# Patient Record
Sex: FEMALE | Race: BLACK OR AFRICAN AMERICAN | Employment: OTHER | URBAN - NONMETROPOLITAN AREA
[De-identification: names, ages, dates, MRNs, and addresses within clinical notes are randomized per-mention and may not be internally consistent; named-entity substitution may affect disease eponyms.]

---

## 2017-01-12 RX ORDER — HYDROCODONE BITARTRATE AND ACETAMINOPHEN 10; 325 MG/1; MG/1
1 TABLET ORAL 4 TIMES DAILY
Qty: 120 TABLET | Refills: 0 | Status: SHIPPED | OUTPATIENT
Start: 2017-01-12 | End: 2017-02-13 | Stop reason: SDUPTHER

## 2017-01-23 ENCOUNTER — OFFICE VISIT (OUTPATIENT)
Dept: PAIN MANAGEMENT | Age: 57
End: 2017-01-23

## 2017-01-23 VITALS
HEART RATE: 78 BPM | SYSTOLIC BLOOD PRESSURE: 132 MMHG | DIASTOLIC BLOOD PRESSURE: 84 MMHG | WEIGHT: 166.8 LBS | RESPIRATION RATE: 16 BRPM | HEIGHT: 60 IN | BODY MASS INDEX: 32.75 KG/M2

## 2017-01-23 DIAGNOSIS — M54.12 CERVICAL RADICULAR PAIN: ICD-10-CM

## 2017-01-23 DIAGNOSIS — G89.29 CHRONIC LEFT SHOULDER PAIN: ICD-10-CM

## 2017-01-23 DIAGNOSIS — M25.512 CHRONIC LEFT SHOULDER PAIN: ICD-10-CM

## 2017-01-23 DIAGNOSIS — M79.7 FIBROMYALGIA: ICD-10-CM

## 2017-01-23 DIAGNOSIS — Z79.891 ENCOUNTER FOR LONG-TERM OPIATE ANALGESIC USE: Primary | ICD-10-CM

## 2017-01-23 DIAGNOSIS — Z02.83 ENCOUNTER FOR DRUG SCREENING: ICD-10-CM

## 2017-01-23 DIAGNOSIS — M47.812 FACET ARTHRITIS OF CERVICAL REGION: ICD-10-CM

## 2017-01-23 DIAGNOSIS — M54.2 CERVICALGIA: ICD-10-CM

## 2017-01-23 PROCEDURE — 3014F SCREEN MAMMO DOC REV: CPT | Performed by: NURSE PRACTITIONER

## 2017-01-23 PROCEDURE — 3017F COLORECTAL CA SCREEN DOC REV: CPT | Performed by: NURSE PRACTITIONER

## 2017-01-23 PROCEDURE — 1036F TOBACCO NON-USER: CPT | Performed by: NURSE PRACTITIONER

## 2017-01-23 PROCEDURE — G8419 CALC BMI OUT NRM PARAM NOF/U: HCPCS | Performed by: NURSE PRACTITIONER

## 2017-01-23 PROCEDURE — G8427 DOCREV CUR MEDS BY ELIG CLIN: HCPCS | Performed by: NURSE PRACTITIONER

## 2017-01-23 PROCEDURE — 99214 OFFICE O/P EST MOD 30 MIN: CPT | Performed by: NURSE PRACTITIONER

## 2017-01-23 PROCEDURE — G8484 FLU IMMUNIZE NO ADMIN: HCPCS | Performed by: NURSE PRACTITIONER

## 2017-01-24 DIAGNOSIS — M25.50 ARTHRALGIA, UNSPECIFIED JOINT: Primary | ICD-10-CM

## 2017-02-07 ENCOUNTER — HOSPITAL ENCOUNTER (EMERGENCY)
Age: 57
Discharge: HOME | End: 2017-02-07
Attending: EMERGENCY MEDICINE
Payer: MEDICARE

## 2017-02-07 VITALS
DIASTOLIC BLOOD PRESSURE: 86 MMHG | SYSTOLIC BLOOD PRESSURE: 139 MMHG | TEMPERATURE: 97.9 F | WEIGHT: 167 LBS | HEART RATE: 100 BPM | BODY MASS INDEX: 32.79 KG/M2 | RESPIRATION RATE: 16 BRPM | OXYGEN SATURATION: 99 % | HEIGHT: 60 IN

## 2017-02-07 DIAGNOSIS — G89.29 CHRONIC NECK PAIN: Primary | ICD-10-CM

## 2017-02-07 DIAGNOSIS — M54.2 CHRONIC NECK PAIN: Primary | ICD-10-CM

## 2017-02-07 PROCEDURE — 6360000002 HC RX W HCPCS: Performed by: EMERGENCY MEDICINE

## 2017-02-07 PROCEDURE — 96372 THER/PROPH/DIAG INJ SC/IM: CPT

## 2017-02-07 PROCEDURE — 99283 EMERGENCY DEPT VISIT LOW MDM: CPT

## 2017-02-07 RX ORDER — KETOROLAC TROMETHAMINE 30 MG/ML
30 INJECTION, SOLUTION INTRAMUSCULAR; INTRAVENOUS ONCE
Status: COMPLETED | OUTPATIENT
Start: 2017-02-07 | End: 2017-02-07

## 2017-02-07 RX ADMIN — KETOROLAC TROMETHAMINE 30 MG: 30 INJECTION, SOLUTION INTRAMUSCULAR at 22:27

## 2017-02-07 ASSESSMENT — PAIN DESCRIPTION - ONSET: ONSET: ON-GOING

## 2017-02-07 ASSESSMENT — PAIN DESCRIPTION - PAIN TYPE: TYPE: CHRONIC PAIN

## 2017-02-07 ASSESSMENT — PAIN DESCRIPTION - FREQUENCY: FREQUENCY: INTERMITTENT

## 2017-02-07 ASSESSMENT — PAIN - FUNCTIONAL ASSESSMENT: PAIN_FUNCTIONAL_ASSESSMENT: 0-10

## 2017-02-07 ASSESSMENT — PAIN DESCRIPTION - PROGRESSION: CLINICAL_PROGRESSION: GRADUALLY WORSENING

## 2017-02-07 ASSESSMENT — PAIN SCALES - GENERAL
PAINLEVEL_OUTOF10: 10
PAINLEVEL_OUTOF10: 10
PAINLEVEL_OUTOF10: 7

## 2017-02-07 ASSESSMENT — PAIN DESCRIPTION - LOCATION: LOCATION: NECK;SHOULDER

## 2017-02-07 ASSESSMENT — PAIN DESCRIPTION - ORIENTATION: ORIENTATION: LEFT

## 2017-02-07 ASSESSMENT — PAIN DESCRIPTION - DESCRIPTORS: DESCRIPTORS: SPASM

## 2017-02-08 ENCOUNTER — TELEPHONE (OUTPATIENT)
Dept: FAMILY MEDICINE CLINIC | Age: 57
End: 2017-02-08

## 2017-02-14 RX ORDER — CYCLOBENZAPRINE HCL 10 MG
10 TABLET ORAL 3 TIMES DAILY PRN
Qty: 90 TABLET | Refills: 11 | Status: SHIPPED | OUTPATIENT
Start: 2017-02-14 | End: 2017-03-13 | Stop reason: SDUPTHER

## 2017-02-14 RX ORDER — HYDROCODONE BITARTRATE AND ACETAMINOPHEN 10; 325 MG/1; MG/1
1 TABLET ORAL 4 TIMES DAILY
Qty: 120 TABLET | Refills: 0 | Status: SHIPPED | OUTPATIENT
Start: 2017-02-14 | End: 2017-03-13 | Stop reason: SDUPTHER

## 2017-03-11 ENCOUNTER — HOSPITAL ENCOUNTER (INPATIENT)
Age: 57
LOS: 1 days | Discharge: HOME | DRG: 392 | End: 2017-03-12
Attending: EMERGENCY MEDICINE | Admitting: FAMILY MEDICINE
Payer: MEDICARE

## 2017-03-11 ENCOUNTER — APPOINTMENT (OUTPATIENT)
Dept: CT IMAGING | Age: 57
DRG: 392 | End: 2017-03-11
Payer: MEDICARE

## 2017-03-11 DIAGNOSIS — R10.12 LEFT UPPER QUADRANT PAIN: ICD-10-CM

## 2017-03-11 DIAGNOSIS — R10.13 EPIGASTRIC PAIN: Primary | ICD-10-CM

## 2017-03-11 LAB
-: NORMAL
ABSOLUTE EOS #: 0.2 K/UL (ref 0–0.4)
ABSOLUTE LYMPH #: 1.9 K/UL (ref 1–4.8)
ABSOLUTE MONO #: 0.5 K/UL (ref 0.1–1.2)
ALBUMIN SERPL-MCNC: 4.4 G/DL (ref 3.5–5.2)
ALBUMIN/GLOBULIN RATIO: 1.2 (ref 1–2.5)
ALP BLD-CCNC: 69 U/L (ref 35–104)
ALT SERPL-CCNC: 16 U/L (ref 5–33)
AMORPHOUS: NORMAL
AMYLASE: 170 U/L (ref 28–100)
ANION GAP SERPL CALCULATED.3IONS-SCNC: 17 MMOL/L (ref 9–17)
AST SERPL-CCNC: 23 U/L
BACTERIA: NORMAL
BASOPHILS # BLD: 0 % (ref 0–2)
BASOPHILS ABSOLUTE: 0 K/UL (ref 0–0.2)
BILIRUB SERPL-MCNC: 0.15 MG/DL (ref 0.3–1.2)
BILIRUBIN DIRECT: <0.08 MG/DL
BILIRUBIN URINE: NEGATIVE
BILIRUBIN, INDIRECT: ABNORMAL MG/DL (ref 0–1)
BUN BLDV-MCNC: 13 MG/DL (ref 6–20)
BUN/CREAT BLD: 17 (ref 9–20)
CALCIUM SERPL-MCNC: 9.6 MG/DL (ref 8.6–10.4)
CASTS UA: NORMAL /LPF (ref 0–2)
CHLORIDE BLD-SCNC: 100 MMOL/L (ref 98–107)
CO2: 23 MMOL/L (ref 20–31)
COLOR: ABNORMAL
COMMENT UA: ABNORMAL
CREAT SERPL-MCNC: 0.75 MG/DL (ref 0.5–0.9)
CRYSTALS, UA: NORMAL /HPF
DIFFERENTIAL TYPE: ABNORMAL
EOSINOPHILS RELATIVE PERCENT: 2 % (ref 1–4)
EPITHELIAL CELLS UA: NORMAL /HPF (ref 0–5)
GFR AFRICAN AMERICAN: >60 ML/MIN
GFR NON-AFRICAN AMERICAN: >60 ML/MIN
GFR SERPL CREATININE-BSD FRML MDRD: ABNORMAL ML/MIN/{1.73_M2}
GFR SERPL CREATININE-BSD FRML MDRD: ABNORMAL ML/MIN/{1.73_M2}
GLOBULIN: 3.8 G/DL (ref 1.5–3.8)
GLUCOSE BLD-MCNC: 144 MG/DL (ref 70–99)
GLUCOSE URINE: NEGATIVE
HCT VFR BLD CALC: 37.4 % (ref 36–46)
HEMOGLOBIN: 12.2 G/DL (ref 12–16)
INR BLD: 1
KETONES, URINE: NEGATIVE
LACTIC ACID: 1.2 MMOL/L (ref 0.5–2.2)
LEUKOCYTE ESTERASE, URINE: NEGATIVE
LIPASE: 30 U/L (ref 13–60)
LYMPHOCYTES # BLD: 16 % (ref 24–44)
MCH RBC QN AUTO: 27.5 PG (ref 26–34)
MCHC RBC AUTO-ENTMCNC: 32.5 G/DL (ref 31–37)
MCV RBC AUTO: 84.6 FL (ref 80–100)
MONOCYTES # BLD: 4 % (ref 1–7)
MUCUS: NORMAL
NITRITE, URINE: NEGATIVE
OTHER OBSERVATIONS UA: NORMAL
PARTIAL THROMBOPLASTIN TIME: 22.3 SEC (ref 27–35)
PDW BLD-RTO: 13.2 % (ref 11–14.5)
PH UA: 5 (ref 5–6)
PLATELET # BLD: 349 K/UL (ref 140–450)
PLATELET ESTIMATE: ABNORMAL
PMV BLD AUTO: 8 FL (ref 6–12)
POTASSIUM SERPL-SCNC: 3.4 MMOL/L (ref 3.7–5.3)
PROTEIN UA: NEGATIVE
PROTHROMBIN TIME: 10.7 SEC (ref 9.4–11.3)
RBC # BLD: 4.42 M/UL (ref 4–5.2)
RBC # BLD: ABNORMAL 10*6/UL
RBC UA: NORMAL /HPF (ref 0–4)
RENAL EPITHELIAL, UA: NORMAL /HPF
SEG NEUTROPHILS: 78 % (ref 36–66)
SEGMENTED NEUTROPHILS ABSOLUTE COUNT: 9.5 K/UL (ref 1.8–7.7)
SODIUM BLD-SCNC: 140 MMOL/L (ref 135–144)
SPECIFIC GRAVITY UA: 1 (ref 1.01–1.02)
TOTAL PROTEIN: 8.2 G/DL (ref 6.4–8.3)
TRICHOMONAS: NORMAL
TROPONIN INTERP: NORMAL
TROPONIN T: <0.03 NG/ML
TURBIDITY: ABNORMAL
URINE HGB: ABNORMAL
UROBILINOGEN, URINE: NORMAL
WBC # BLD: 12.2 K/UL (ref 3.5–11)
WBC # BLD: ABNORMAL 10*3/UL
WBC UA: NORMAL /HPF (ref 0–4)
YEAST: NORMAL

## 2017-03-11 PROCEDURE — 82150 ASSAY OF AMYLASE: CPT

## 2017-03-11 PROCEDURE — 83605 ASSAY OF LACTIC ACID: CPT

## 2017-03-11 PROCEDURE — 84484 ASSAY OF TROPONIN QUANT: CPT

## 2017-03-11 PROCEDURE — 85730 THROMBOPLASTIN TIME PARTIAL: CPT

## 2017-03-11 PROCEDURE — 6360000002 HC RX W HCPCS: Performed by: EMERGENCY MEDICINE

## 2017-03-11 PROCEDURE — 81001 URINALYSIS AUTO W/SCOPE: CPT

## 2017-03-11 PROCEDURE — 96375 TX/PRO/DX INJ NEW DRUG ADDON: CPT

## 2017-03-11 PROCEDURE — 2580000003 HC RX 258: Performed by: EMERGENCY MEDICINE

## 2017-03-11 PROCEDURE — 80076 HEPATIC FUNCTION PANEL: CPT

## 2017-03-11 PROCEDURE — 6360000004 HC RX CONTRAST MEDICATION: Performed by: EMERGENCY MEDICINE

## 2017-03-11 PROCEDURE — 85025 COMPLETE CBC W/AUTO DIFF WBC: CPT

## 2017-03-11 PROCEDURE — 2500000003 HC RX 250 WO HCPCS: Performed by: NURSE PRACTITIONER

## 2017-03-11 PROCEDURE — 93005 ELECTROCARDIOGRAM TRACING: CPT

## 2017-03-11 PROCEDURE — 99285 EMERGENCY DEPT VISIT HI MDM: CPT

## 2017-03-11 PROCEDURE — 85610 PROTHROMBIN TIME: CPT

## 2017-03-11 PROCEDURE — 36415 COLL VENOUS BLD VENIPUNCTURE: CPT

## 2017-03-11 PROCEDURE — 2060000000 HC ICU INTERMEDIATE R&B

## 2017-03-11 PROCEDURE — 74177 CT ABD & PELVIS W/CONTRAST: CPT | Performed by: RADIOLOGY

## 2017-03-11 PROCEDURE — 96374 THER/PROPH/DIAG INJ IV PUSH: CPT

## 2017-03-11 PROCEDURE — S0028 INJECTION, FAMOTIDINE, 20 MG: HCPCS | Performed by: NURSE PRACTITIONER

## 2017-03-11 PROCEDURE — 80048 BASIC METABOLIC PNL TOTAL CA: CPT

## 2017-03-11 PROCEDURE — 83690 ASSAY OF LIPASE: CPT

## 2017-03-11 PROCEDURE — 74177 CT ABD & PELVIS W/CONTRAST: CPT

## 2017-03-11 PROCEDURE — 6360000002 HC RX W HCPCS: Performed by: NURSE PRACTITIONER

## 2017-03-11 RX ORDER — POTASSIUM CHLORIDE 7.45 MG/ML
10 INJECTION INTRAVENOUS
Status: COMPLETED | OUTPATIENT
Start: 2017-03-11 | End: 2017-03-12

## 2017-03-11 RX ORDER — SODIUM CHLORIDE 9 MG/ML
INJECTION, SOLUTION INTRAVENOUS CONTINUOUS
Status: DISCONTINUED | OUTPATIENT
Start: 2017-03-11 | End: 2017-03-12 | Stop reason: HOSPADM

## 2017-03-11 RX ORDER — SODIUM CHLORIDE 0.9 % (FLUSH) 0.9 %
10 SYRINGE (ML) INJECTION EVERY 12 HOURS SCHEDULED
Status: DISCONTINUED | OUTPATIENT
Start: 2017-03-11 | End: 2017-03-12 | Stop reason: HOSPADM

## 2017-03-11 RX ORDER — ENALAPRILAT 2.5 MG/2ML
1.25 INJECTION INTRAVENOUS EVERY 6 HOURS PRN
Status: DISCONTINUED | OUTPATIENT
Start: 2017-03-11 | End: 2017-03-12 | Stop reason: HOSPADM

## 2017-03-11 RX ORDER — SODIUM CHLORIDE 0.9 % (FLUSH) 0.9 %
10 SYRINGE (ML) INJECTION PRN
Status: DISCONTINUED | OUTPATIENT
Start: 2017-03-11 | End: 2017-03-12 | Stop reason: HOSPADM

## 2017-03-11 RX ORDER — ONDANSETRON 2 MG/ML
4 INJECTION INTRAMUSCULAR; INTRAVENOUS EVERY 6 HOURS PRN
Status: DISCONTINUED | OUTPATIENT
Start: 2017-03-11 | End: 2017-03-12 | Stop reason: HOSPADM

## 2017-03-11 RX ORDER — ONDANSETRON 2 MG/ML
4 INJECTION INTRAMUSCULAR; INTRAVENOUS ONCE
Status: COMPLETED | OUTPATIENT
Start: 2017-03-11 | End: 2017-03-11

## 2017-03-11 RX ORDER — MORPHINE SULFATE 4 MG/ML
4 INJECTION, SOLUTION INTRAMUSCULAR; INTRAVENOUS ONCE
Status: COMPLETED | OUTPATIENT
Start: 2017-03-11 | End: 2017-03-11

## 2017-03-11 RX ORDER — MORPHINE SULFATE 4 MG/ML
4 INJECTION, SOLUTION INTRAMUSCULAR; INTRAVENOUS
Status: DISCONTINUED | OUTPATIENT
Start: 2017-03-11 | End: 2017-03-12 | Stop reason: HOSPADM

## 2017-03-11 RX ADMIN — SODIUM CHLORIDE: 9 INJECTION, SOLUTION INTRAVENOUS at 19:02

## 2017-03-11 RX ADMIN — MORPHINE SULFATE 4 MG: 4 INJECTION, SOLUTION INTRAMUSCULAR; INTRAVENOUS at 19:12

## 2017-03-11 RX ADMIN — POTASSIUM CHLORIDE 10 MEQ: 7.46 INJECTION, SOLUTION INTRAVENOUS at 23:02

## 2017-03-11 RX ADMIN — FAMOTIDINE 20 MG: 10 INJECTION, SOLUTION INTRAVENOUS at 22:25

## 2017-03-11 RX ADMIN — HYDROMORPHONE HYDROCHLORIDE 0.5 MG: 1 INJECTION, SOLUTION INTRAMUSCULAR; INTRAVENOUS; SUBCUTANEOUS at 21:09

## 2017-03-11 RX ADMIN — ONDANSETRON 4 MG: 2 INJECTION INTRAMUSCULAR; INTRAVENOUS at 09:05

## 2017-03-11 RX ADMIN — IOVERSOL 130 ML: 741 INJECTION INTRA-ARTERIAL; INTRAVENOUS at 20:03

## 2017-03-11 RX ADMIN — HYDROMORPHONE HYDROCHLORIDE 1 MG: 1 INJECTION, SOLUTION INTRAMUSCULAR; INTRAVENOUS; SUBCUTANEOUS at 19:38

## 2017-03-11 ASSESSMENT — PAIN DESCRIPTION - ORIENTATION
ORIENTATION: LEFT;UPPER
ORIENTATION: LEFT;UPPER

## 2017-03-11 ASSESSMENT — PAIN SCALES - GENERAL
PAINLEVEL_OUTOF10: 10
PAINLEVEL_OUTOF10: 8
PAINLEVEL_OUTOF10: 8
PAINLEVEL_OUTOF10: 9
PAINLEVEL_OUTOF10: 8

## 2017-03-11 ASSESSMENT — PAIN DESCRIPTION - LOCATION
LOCATION: ABDOMEN
LOCATION: ABDOMEN

## 2017-03-11 ASSESSMENT — PAIN - FUNCTIONAL ASSESSMENT: PAIN_FUNCTIONAL_ASSESSMENT: 0-10

## 2017-03-11 ASSESSMENT — PAIN DESCRIPTION - PAIN TYPE: TYPE: ACUTE PAIN

## 2017-03-11 ASSESSMENT — PAIN DESCRIPTION - PROGRESSION: CLINICAL_PROGRESSION: NOT CHANGED

## 2017-03-11 ASSESSMENT — PAIN DESCRIPTION - FREQUENCY: FREQUENCY: CONTINUOUS

## 2017-03-11 ASSESSMENT — PAIN DESCRIPTION - ONSET: ONSET: ON-GOING

## 2017-03-11 ASSESSMENT — PAIN DESCRIPTION - DESCRIPTORS
DESCRIPTORS: STABBING;SPASM
DESCRIPTORS: SHARP;STABBING

## 2017-03-12 ENCOUNTER — APPOINTMENT (OUTPATIENT)
Dept: GENERAL RADIOLOGY | Age: 57
DRG: 392 | End: 2017-03-12
Payer: MEDICARE

## 2017-03-12 VITALS
OXYGEN SATURATION: 96 % | TEMPERATURE: 97.3 F | HEART RATE: 102 BPM | RESPIRATION RATE: 16 BRPM | BODY MASS INDEX: 32.2 KG/M2 | HEIGHT: 60 IN | WEIGHT: 164 LBS | SYSTOLIC BLOOD PRESSURE: 137 MMHG | DIASTOLIC BLOOD PRESSURE: 71 MMHG

## 2017-03-12 LAB
ABSOLUTE EOS #: 0.2 K/UL (ref 0–0.4)
ABSOLUTE LYMPH #: 1.3 K/UL (ref 1–4.8)
ABSOLUTE MONO #: 0.7 K/UL (ref 0.1–1.2)
ALBUMIN SERPL-MCNC: 3.8 G/DL (ref 3.5–5.2)
ALBUMIN/GLOBULIN RATIO: 1.2 (ref 1–2.5)
ALP BLD-CCNC: 58 U/L (ref 35–104)
ALT SERPL-CCNC: 11 U/L (ref 5–33)
AMYLASE: 77 U/L (ref 28–100)
ANION GAP SERPL CALCULATED.3IONS-SCNC: 11 MMOL/L (ref 9–17)
AST SERPL-CCNC: 17 U/L
BASOPHILS # BLD: 0 % (ref 0–2)
BASOPHILS ABSOLUTE: 0 K/UL (ref 0–0.2)
BILIRUB SERPL-MCNC: 0.17 MG/DL (ref 0.3–1.2)
BUN BLDV-MCNC: 11 MG/DL (ref 6–20)
BUN/CREAT BLD: 17 (ref 9–20)
CALCIUM SERPL-MCNC: 8.7 MG/DL (ref 8.6–10.4)
CHLORIDE BLD-SCNC: 106 MMOL/L (ref 98–107)
CO2: 25 MMOL/L (ref 20–31)
CREAT SERPL-MCNC: 0.63 MG/DL (ref 0.5–0.9)
DIFFERENTIAL TYPE: ABNORMAL
EKG ATRIAL RATE: 125 BPM
EKG P AXIS: 22 DEGREES
EKG P-R INTERVAL: 104 MS
EKG Q-T INTERVAL: 424 MS
EKG QRS DURATION: 80 MS
EKG QTC CALCULATION (BAZETT): 607 MS
EKG R AXIS: 57 DEGREES
EKG T AXIS: 63 DEGREES
EKG VENTRICULAR RATE: 123 BPM
EOSINOPHILS RELATIVE PERCENT: 2 % (ref 1–4)
GFR AFRICAN AMERICAN: >60 ML/MIN
GFR NON-AFRICAN AMERICAN: >60 ML/MIN
GFR SERPL CREATININE-BSD FRML MDRD: ABNORMAL ML/MIN/{1.73_M2}
GFR SERPL CREATININE-BSD FRML MDRD: ABNORMAL ML/MIN/{1.73_M2}
GLUCOSE BLD-MCNC: 99 MG/DL (ref 70–99)
HCT VFR BLD CALC: 33.3 % (ref 36–46)
HEMOGLOBIN: 10.7 G/DL (ref 12–16)
LYMPHOCYTES # BLD: 20 % (ref 24–44)
MAGNESIUM: 1.9 MG/DL (ref 1.6–2.6)
MCH RBC QN AUTO: 27.6 PG (ref 26–34)
MCHC RBC AUTO-ENTMCNC: 32.3 G/DL (ref 31–37)
MCV RBC AUTO: 85.5 FL (ref 80–100)
MONOCYTES # BLD: 10 % (ref 1–7)
PDW BLD-RTO: 13.5 % (ref 11–14.5)
PLATELET # BLD: 318 K/UL (ref 140–450)
PLATELET ESTIMATE: ABNORMAL
PMV BLD AUTO: 7.9 FL (ref 6–12)
POTASSIUM SERPL-SCNC: 4.1 MMOL/L (ref 3.7–5.3)
RBC # BLD: 3.89 M/UL (ref 4–5.2)
RBC # BLD: ABNORMAL 10*6/UL
SEG NEUTROPHILS: 68 % (ref 36–66)
SEGMENTED NEUTROPHILS ABSOLUTE COUNT: 4.3 K/UL (ref 1.8–7.7)
SODIUM BLD-SCNC: 142 MMOL/L (ref 135–144)
TOTAL PROTEIN: 7 G/DL (ref 6.4–8.3)
WBC # BLD: 6.4 K/UL (ref 3.5–11)
WBC # BLD: ABNORMAL 10*3/UL

## 2017-03-12 PROCEDURE — 85025 COMPLETE CBC W/AUTO DIFF WBC: CPT

## 2017-03-12 PROCEDURE — 83735 ASSAY OF MAGNESIUM: CPT

## 2017-03-12 PROCEDURE — 80053 COMPREHEN METABOLIC PANEL: CPT

## 2017-03-12 PROCEDURE — 2580000003 HC RX 258: Performed by: NURSE PRACTITIONER

## 2017-03-12 PROCEDURE — S0028 INJECTION, FAMOTIDINE, 20 MG: HCPCS | Performed by: NURSE PRACTITIONER

## 2017-03-12 PROCEDURE — 99222 1ST HOSP IP/OBS MODERATE 55: CPT | Performed by: SURGERY

## 2017-03-12 PROCEDURE — 74022 RADEX COMPL AQT ABD SERIES: CPT

## 2017-03-12 PROCEDURE — 82150 ASSAY OF AMYLASE: CPT

## 2017-03-12 PROCEDURE — 6360000002 HC RX W HCPCS: Performed by: NURSE PRACTITIONER

## 2017-03-12 PROCEDURE — 99222 1ST HOSP IP/OBS MODERATE 55: CPT | Performed by: FAMILY MEDICINE

## 2017-03-12 PROCEDURE — 74022 RADEX COMPL AQT ABD SERIES: CPT | Performed by: RADIOLOGY

## 2017-03-12 PROCEDURE — 2500000003 HC RX 250 WO HCPCS: Performed by: NURSE PRACTITIONER

## 2017-03-12 PROCEDURE — 36415 COLL VENOUS BLD VENIPUNCTURE: CPT

## 2017-03-12 RX ADMIN — SODIUM CHLORIDE: 9 INJECTION, SOLUTION INTRAVENOUS at 06:04

## 2017-03-12 RX ADMIN — FAMOTIDINE 20 MG: 10 INJECTION, SOLUTION INTRAVENOUS at 09:09

## 2017-03-12 RX ADMIN — ENOXAPARIN SODIUM 40 MG: 40 INJECTION SUBCUTANEOUS at 09:09

## 2017-03-12 RX ADMIN — POTASSIUM CHLORIDE 10 MEQ: 7.46 INJECTION, SOLUTION INTRAVENOUS at 00:05

## 2017-03-12 ASSESSMENT — PAIN SCALES - GENERAL
PAINLEVEL_OUTOF10: 0
PAINLEVEL_OUTOF10: 0

## 2017-03-13 ENCOUNTER — CARE COORDINATION (OUTPATIENT)
Dept: CARE COORDINATION | Age: 57
End: 2017-03-13

## 2017-03-13 RX ORDER — HYDROCODONE BITARTRATE AND ACETAMINOPHEN 10; 325 MG/1; MG/1
1 TABLET ORAL 4 TIMES DAILY
Qty: 120 TABLET | Refills: 0 | Status: SHIPPED | OUTPATIENT
Start: 2017-03-13 | End: 2017-04-11 | Stop reason: SDUPTHER

## 2017-03-13 RX ORDER — CYCLOBENZAPRINE HCL 10 MG
10 TABLET ORAL 3 TIMES DAILY PRN
Qty: 90 TABLET | Refills: 11 | Status: SHIPPED | OUTPATIENT
Start: 2017-03-13 | End: 2017-08-11 | Stop reason: SDUPTHER

## 2017-03-20 ENCOUNTER — OFFICE VISIT (OUTPATIENT)
Dept: PAIN MANAGEMENT | Age: 57
End: 2017-03-20
Payer: MEDICARE

## 2017-03-20 ENCOUNTER — OFFICE VISIT (OUTPATIENT)
Dept: FAMILY MEDICINE CLINIC | Age: 57
End: 2017-03-20
Payer: MEDICARE

## 2017-03-20 ENCOUNTER — CARE COORDINATOR VISIT (OUTPATIENT)
Dept: CARE COORDINATION | Age: 57
End: 2017-03-20

## 2017-03-20 VITALS
SYSTOLIC BLOOD PRESSURE: 138 MMHG | RESPIRATION RATE: 12 BRPM | WEIGHT: 166.8 LBS | BODY MASS INDEX: 31.49 KG/M2 | HEART RATE: 116 BPM | HEIGHT: 61 IN | DIASTOLIC BLOOD PRESSURE: 90 MMHG

## 2017-03-20 VITALS
SYSTOLIC BLOOD PRESSURE: 132 MMHG | DIASTOLIC BLOOD PRESSURE: 74 MMHG | HEIGHT: 61 IN | HEART RATE: 112 BPM | TEMPERATURE: 98.6 F | WEIGHT: 166 LBS | OXYGEN SATURATION: 98 % | BODY MASS INDEX: 31.34 KG/M2

## 2017-03-20 DIAGNOSIS — Z13.1 SCREENING FOR DIABETES MELLITUS: ICD-10-CM

## 2017-03-20 DIAGNOSIS — G89.29 CHRONIC LEFT SHOULDER PAIN: ICD-10-CM

## 2017-03-20 DIAGNOSIS — M54.12 CERVICAL RADICULAR PAIN: ICD-10-CM

## 2017-03-20 DIAGNOSIS — M47.812 FACET ARTHRITIS OF CERVICAL REGION: ICD-10-CM

## 2017-03-20 DIAGNOSIS — M54.2 CERVICALGIA: ICD-10-CM

## 2017-03-20 DIAGNOSIS — N63.0 BREAST NODULE: ICD-10-CM

## 2017-03-20 DIAGNOSIS — E55.9 VITAMIN D DEFICIENCY: ICD-10-CM

## 2017-03-20 DIAGNOSIS — Z13.220 SCREENING FOR LIPOID DISORDERS: ICD-10-CM

## 2017-03-20 DIAGNOSIS — M79.7 FIBROMYALGIA: Primary | ICD-10-CM

## 2017-03-20 DIAGNOSIS — K21.9 GASTROESOPHAGEAL REFLUX DISEASE, ESOPHAGITIS PRESENCE NOT SPECIFIED: ICD-10-CM

## 2017-03-20 DIAGNOSIS — M25.512 CHRONIC LEFT SHOULDER PAIN: ICD-10-CM

## 2017-03-20 DIAGNOSIS — R10.13 EPIGASTRIC PAIN: Primary | ICD-10-CM

## 2017-03-20 DIAGNOSIS — I10 ESSENTIAL HYPERTENSION, BENIGN: ICD-10-CM

## 2017-03-20 PROCEDURE — G8417 CALC BMI ABV UP PARAM F/U: HCPCS | Performed by: NURSE PRACTITIONER

## 2017-03-20 PROCEDURE — 3017F COLORECTAL CA SCREEN DOC REV: CPT | Performed by: NURSE PRACTITIONER

## 2017-03-20 PROCEDURE — G8484 FLU IMMUNIZE NO ADMIN: HCPCS | Performed by: NURSE PRACTITIONER

## 2017-03-20 PROCEDURE — 99495 TRANSJ CARE MGMT MOD F2F 14D: CPT | Performed by: FAMILY MEDICINE

## 2017-03-20 PROCEDURE — 1111F DSCHRG MED/CURRENT MED MERGE: CPT | Performed by: NURSE PRACTITIONER

## 2017-03-20 PROCEDURE — G8427 DOCREV CUR MEDS BY ELIG CLIN: HCPCS | Performed by: NURSE PRACTITIONER

## 2017-03-20 PROCEDURE — 99213 OFFICE O/P EST LOW 20 MIN: CPT | Performed by: NURSE PRACTITIONER

## 2017-03-20 PROCEDURE — 1036F TOBACCO NON-USER: CPT | Performed by: NURSE PRACTITIONER

## 2017-03-20 PROCEDURE — 3014F SCREEN MAMMO DOC REV: CPT | Performed by: NURSE PRACTITIONER

## 2017-03-20 RX ORDER — OMEPRAZOLE 20 MG/1
20 CAPSULE, DELAYED RELEASE ORAL DAILY
Qty: 30 CAPSULE | Refills: 5 | Status: SHIPPED | OUTPATIENT
Start: 2017-03-20 | End: 2017-08-01 | Stop reason: SDUPTHER

## 2017-03-20 RX ORDER — LISINOPRIL 10 MG/1
10 TABLET ORAL DAILY
Qty: 30 TABLET | Refills: 5 | Status: SHIPPED | OUTPATIENT
Start: 2017-03-20 | End: 2017-08-01 | Stop reason: SDUPTHER

## 2017-03-20 RX ORDER — SUCRALFATE 1 G/1
1 TABLET ORAL 4 TIMES DAILY PRN
Qty: 30 TABLET | Refills: 0 | Status: SHIPPED | OUTPATIENT
Start: 2017-03-20 | End: 2017-07-12 | Stop reason: SDUPTHER

## 2017-03-20 RX ORDER — RANITIDINE 150 MG/1
150 TABLET ORAL NIGHTLY
Qty: 30 TABLET | Refills: 5 | Status: SHIPPED | OUTPATIENT
Start: 2017-03-20 | End: 2017-08-01 | Stop reason: SDUPTHER

## 2017-03-20 ASSESSMENT — ENCOUNTER SYMPTOMS
NAUSEA: 0
DIARRHEA: 0
VOMITING: 0
CONSTIPATION: 0

## 2017-03-24 ENCOUNTER — HOSPITAL ENCOUNTER (OUTPATIENT)
Age: 57
Setting detail: SPECIMEN
Discharge: HOME | End: 2017-03-24

## 2017-03-24 LAB — VITAMIN D 25-HYDROXY: 31.3 NG/ML (ref 30–100)

## 2017-03-28 ENCOUNTER — OFFICE VISIT (OUTPATIENT)
Dept: SURGERY | Age: 57
End: 2017-03-28
Payer: MEDICARE

## 2017-03-28 ENCOUNTER — TELEPHONE (OUTPATIENT)
Dept: SURGERY | Age: 57
End: 2017-03-28

## 2017-03-28 VITALS
SYSTOLIC BLOOD PRESSURE: 134 MMHG | BODY MASS INDEX: 31.42 KG/M2 | WEIGHT: 166.4 LBS | DIASTOLIC BLOOD PRESSURE: 88 MMHG | TEMPERATURE: 97.8 F | RESPIRATION RATE: 14 BRPM | HEART RATE: 108 BPM | HEIGHT: 61 IN

## 2017-03-28 DIAGNOSIS — K58.2 IRRITABLE BOWEL SYNDROME WITH BOTH CONSTIPATION AND DIARRHEA: ICD-10-CM

## 2017-03-28 DIAGNOSIS — R10.13 EPIGASTRIC PAIN: Primary | ICD-10-CM

## 2017-03-28 PROCEDURE — 3014F SCREEN MAMMO DOC REV: CPT | Performed by: SURGERY

## 2017-03-28 PROCEDURE — G8484 FLU IMMUNIZE NO ADMIN: HCPCS | Performed by: SURGERY

## 2017-03-28 PROCEDURE — 1111F DSCHRG MED/CURRENT MED MERGE: CPT | Performed by: SURGERY

## 2017-03-28 PROCEDURE — 1036F TOBACCO NON-USER: CPT | Performed by: SURGERY

## 2017-03-28 PROCEDURE — 3017F COLORECTAL CA SCREEN DOC REV: CPT | Performed by: SURGERY

## 2017-03-28 PROCEDURE — G8427 DOCREV CUR MEDS BY ELIG CLIN: HCPCS | Performed by: SURGERY

## 2017-03-28 PROCEDURE — 99214 OFFICE O/P EST MOD 30 MIN: CPT | Performed by: SURGERY

## 2017-03-28 PROCEDURE — G8417 CALC BMI ABV UP PARAM F/U: HCPCS | Performed by: SURGERY

## 2017-03-28 RX ORDER — DICYCLOMINE HYDROCHLORIDE 10 MG/1
10 CAPSULE ORAL 4 TIMES DAILY PRN
Qty: 120 CAPSULE | Refills: 3 | Status: SHIPPED | OUTPATIENT
Start: 2017-03-28 | End: 2018-06-12 | Stop reason: SDUPTHER

## 2017-03-30 ENCOUNTER — CARE COORDINATION (OUTPATIENT)
Dept: CARE COORDINATION | Age: 57
End: 2017-03-30

## 2017-04-11 RX ORDER — HYDROCODONE BITARTRATE AND ACETAMINOPHEN 10; 325 MG/1; MG/1
1 TABLET ORAL 4 TIMES DAILY
Qty: 120 TABLET | Refills: 0 | Status: SHIPPED | OUTPATIENT
Start: 2017-04-12 | End: 2017-05-10 | Stop reason: SDUPTHER

## 2017-04-13 ENCOUNTER — ANESTHESIA (OUTPATIENT)
Dept: OPERATING ROOM | Age: 57
End: 2017-04-13
Payer: MEDICARE

## 2017-04-13 ENCOUNTER — ANESTHESIA EVENT (OUTPATIENT)
Dept: OPERATING ROOM | Age: 57
End: 2017-04-13
Payer: MEDICARE

## 2017-04-13 ENCOUNTER — HOSPITAL ENCOUNTER (OUTPATIENT)
Age: 57
Setting detail: OUTPATIENT SURGERY
Discharge: HOME | End: 2017-04-13
Attending: SURGERY | Admitting: SURGERY
Payer: MEDICARE

## 2017-04-13 VITALS
BODY MASS INDEX: 33.26 KG/M2 | WEIGHT: 169.4 LBS | OXYGEN SATURATION: 98 % | RESPIRATION RATE: 20 BRPM | SYSTOLIC BLOOD PRESSURE: 109 MMHG | TEMPERATURE: 98.7 F | DIASTOLIC BLOOD PRESSURE: 85 MMHG | HEIGHT: 60 IN | HEART RATE: 94 BPM

## 2017-04-13 VITALS
OXYGEN SATURATION: 95 % | SYSTOLIC BLOOD PRESSURE: 173 MMHG | DIASTOLIC BLOOD PRESSURE: 114 MMHG | RESPIRATION RATE: 21 BRPM

## 2017-04-13 PROCEDURE — 3609012400 HC EGD TRANSORAL BIOPSY SINGLE/MULTIPLE: Performed by: SURGERY

## 2017-04-13 PROCEDURE — 00740 PR ANESTH,UGI ENDOSCOPY: CPT | Performed by: NURSE ANESTHETIST, CERTIFIED REGISTERED

## 2017-04-13 PROCEDURE — 6360000002 HC RX W HCPCS: Performed by: NURSE ANESTHETIST, CERTIFIED REGISTERED

## 2017-04-13 PROCEDURE — 3700000000 HC ANESTHESIA ATTENDED CARE: Performed by: SURGERY

## 2017-04-13 PROCEDURE — 88305 TISSUE EXAM BY PATHOLOGIST: CPT

## 2017-04-13 PROCEDURE — 7100000010 HC PHASE II RECOVERY - FIRST 15 MIN: Performed by: SURGERY

## 2017-04-13 PROCEDURE — 43239 EGD BIOPSY SINGLE/MULTIPLE: CPT | Performed by: SURGERY

## 2017-04-13 PROCEDURE — 88342 IMHCHEM/IMCYTCHM 1ST ANTB: CPT

## 2017-04-13 PROCEDURE — 7100000011 HC PHASE II RECOVERY - ADDTL 15 MIN: Performed by: SURGERY

## 2017-04-13 PROCEDURE — 2580000003 HC RX 258: Performed by: SURGERY

## 2017-04-13 RX ORDER — SODIUM CHLORIDE, SODIUM LACTATE, POTASSIUM CHLORIDE, CALCIUM CHLORIDE 600; 310; 30; 20 MG/100ML; MG/100ML; MG/100ML; MG/100ML
INJECTION, SOLUTION INTRAVENOUS CONTINUOUS
Status: DISCONTINUED | OUTPATIENT
Start: 2017-04-13 | End: 2017-04-13 | Stop reason: HOSPADM

## 2017-04-13 RX ORDER — MIDAZOLAM HYDROCHLORIDE 1 MG/ML
INJECTION INTRAMUSCULAR; INTRAVENOUS PRN
Status: DISCONTINUED | OUTPATIENT
Start: 2017-04-13 | End: 2017-04-13 | Stop reason: SDUPTHER

## 2017-04-13 RX ORDER — FENTANYL CITRATE 50 UG/ML
INJECTION, SOLUTION INTRAMUSCULAR; INTRAVENOUS PRN
Status: DISCONTINUED | OUTPATIENT
Start: 2017-04-13 | End: 2017-04-13 | Stop reason: SDUPTHER

## 2017-04-13 RX ORDER — PROPOFOL 10 MG/ML
INJECTION, EMULSION INTRAVENOUS PRN
Status: DISCONTINUED | OUTPATIENT
Start: 2017-04-13 | End: 2017-04-13 | Stop reason: SDUPTHER

## 2017-04-13 RX ADMIN — SODIUM CHLORIDE, POTASSIUM CHLORIDE, SODIUM LACTATE AND CALCIUM CHLORIDE: 600; 310; 30; 20 INJECTION, SOLUTION INTRAVENOUS at 07:50

## 2017-04-13 RX ADMIN — PROPOFOL 40 MG: 10 INJECTION, EMULSION INTRAVENOUS at 08:33

## 2017-04-13 RX ADMIN — FENTANYL CITRATE 100 MCG: 50 INJECTION INTRAMUSCULAR; INTRAVENOUS at 08:33

## 2017-04-13 RX ADMIN — PROPOFOL 30 MG: 10 INJECTION, EMULSION INTRAVENOUS at 08:38

## 2017-04-13 RX ADMIN — MIDAZOLAM 2 MG: 1 INJECTION INTRAMUSCULAR; INTRAVENOUS at 08:33

## 2017-04-13 ASSESSMENT — PAIN SCALES - GENERAL
PAINLEVEL_OUTOF10: 0
PAINLEVEL_OUTOF10: 0

## 2017-04-13 ASSESSMENT — PAIN DESCRIPTION - DESCRIPTORS: DESCRIPTORS: CRAMPING

## 2017-04-13 ASSESSMENT — PAIN - FUNCTIONAL ASSESSMENT: PAIN_FUNCTIONAL_ASSESSMENT: 0-10

## 2017-04-19 LAB — SURGICAL PATHOLOGY REPORT: NORMAL

## 2017-05-10 RX ORDER — HYDROCODONE BITARTRATE AND ACETAMINOPHEN 10; 325 MG/1; MG/1
1 TABLET ORAL 4 TIMES DAILY
Qty: 120 TABLET | Refills: 0 | Status: SHIPPED | OUTPATIENT
Start: 2017-05-12 | End: 2017-06-12 | Stop reason: SDUPTHER

## 2017-05-22 ENCOUNTER — OFFICE VISIT (OUTPATIENT)
Dept: PAIN MANAGEMENT | Age: 57
End: 2017-05-22
Payer: MEDICARE

## 2017-05-22 ENCOUNTER — HOSPITAL ENCOUNTER (OUTPATIENT)
Age: 57
Setting detail: SPECIMEN
Discharge: HOME | End: 2017-05-22

## 2017-05-22 VITALS
SYSTOLIC BLOOD PRESSURE: 120 MMHG | HEART RATE: 100 BPM | RESPIRATION RATE: 14 BRPM | DIASTOLIC BLOOD PRESSURE: 82 MMHG | BODY MASS INDEX: 31.34 KG/M2 | HEIGHT: 61 IN | WEIGHT: 166 LBS

## 2017-05-22 DIAGNOSIS — M54.12 CERVICAL RADICULAR PAIN: ICD-10-CM

## 2017-05-22 DIAGNOSIS — M54.2 CERVICALGIA: ICD-10-CM

## 2017-05-22 DIAGNOSIS — Z79.891 ENCOUNTER FOR LONG-TERM OPIATE ANALGESIC USE: ICD-10-CM

## 2017-05-22 DIAGNOSIS — Z02.83 ENCOUNTER FOR DRUG SCREENING: ICD-10-CM

## 2017-05-22 DIAGNOSIS — M79.7 FIBROMYALGIA: ICD-10-CM

## 2017-05-22 DIAGNOSIS — M47.812 FACET ARTHRITIS OF CERVICAL REGION: Primary | ICD-10-CM

## 2017-05-22 PROCEDURE — G8427 DOCREV CUR MEDS BY ELIG CLIN: HCPCS | Performed by: NURSE PRACTITIONER

## 2017-05-22 PROCEDURE — 1036F TOBACCO NON-USER: CPT | Performed by: NURSE PRACTITIONER

## 2017-05-22 PROCEDURE — 3017F COLORECTAL CA SCREEN DOC REV: CPT | Performed by: NURSE PRACTITIONER

## 2017-05-22 PROCEDURE — G8417 CALC BMI ABV UP PARAM F/U: HCPCS | Performed by: NURSE PRACTITIONER

## 2017-05-22 PROCEDURE — 3014F SCREEN MAMMO DOC REV: CPT | Performed by: NURSE PRACTITIONER

## 2017-05-22 PROCEDURE — 99214 OFFICE O/P EST MOD 30 MIN: CPT | Performed by: NURSE PRACTITIONER

## 2017-05-24 LAB
6-ACETYLMORPHINE, UR: NOT DETECTED
7-AMINOCLONAZEPAM, URINE: NOT DETECTED
ALPHA-OH-ALPRAZ, URINE: NOT DETECTED
ALPRAZOLAM, URINE: NOT DETECTED
AMPHETAMINES, URINE: NOT DETECTED
BARBITURATES, URINE: NOT DETECTED
BENZOYLECGONINE, UR: NOT DETECTED
BUPRENORPHINE URINE: NOT DETECTED
CARISOPRODOL, UR: NOT DETECTED
CLONAZEPAM, URINE: NOT DETECTED
CODEINE, URINE: NOT DETECTED
CREATININE URINE: 138.6 MG/DL (ref 20–400)
DIAZEPAM, URINE: NOT DETECTED
EER PAIN MGT DRUG PANEL, HIGH RES/EMIT U: NORMAL
ETHYL GLUCURONIDE UR: NOT DETECTED
FENTANYL URINE: NOT DETECTED
HYDROCODONE, URINE: PRESENT
HYDROMORPHONE, URINE: NOT DETECTED
LORAZEPAM, URINE: NOT DETECTED
MARIJUANA METAB, UR: NOT DETECTED
MDA, UR: NOT DETECTED
MDEA, EVE, UR: NOT DETECTED
MDMA URINE: NOT DETECTED
MEPERIDINE METAB, UR: NOT DETECTED
METHADONE, URINE: NOT DETECTED
METHAMPHETAMINE, URINE: NOT DETECTED
METHYLPHENIDATE: NOT DETECTED
MIDAZOLAM, URINE: NOT DETECTED
MORPHINE URINE: NOT DETECTED
NORBUPRENORPHINE, URINE: NOT DETECTED
NORDIAZEPAM, URINE: NOT DETECTED
NORFENTANYL, URINE: NOT DETECTED
NORHYDROCODONE, URINE: PRESENT
NOROXYCODONE, URINE: NOT DETECTED
NOROXYMORPHONE, URINE: NOT DETECTED
OXAZEPAM, URINE: NOT DETECTED
OXYCODONE URINE: NOT DETECTED
OXYMORPHONE, URINE: NOT DETECTED
PAIN MGT DRUG PANEL, HI RES, UR: NORMAL
PCP,URINE: NOT DETECTED
PHENTERMINE, UR: NOT DETECTED
PROPOXYPHENE, URINE: NOT DETECTED
TAPENTADOL, URINE: NOT DETECTED
TAPENTADOL-O-SULFATE, URINE: NOT DETECTED
TEMAZEPAM, URINE: NOT DETECTED
TRAMADOL, URINE: NOT DETECTED
ZOLPIDEM, URINE: NOT DETECTED

## 2017-05-31 ENCOUNTER — CARE COORDINATION (OUTPATIENT)
Dept: CARE COORDINATION | Age: 57
End: 2017-05-31

## 2017-06-12 DIAGNOSIS — M79.7 FIBROMYALGIA: ICD-10-CM

## 2017-06-12 DIAGNOSIS — M32.9 LUPUS (HCC): Primary | ICD-10-CM

## 2017-06-12 DIAGNOSIS — M25.551 RIGHT HIP PAIN: ICD-10-CM

## 2017-06-12 DIAGNOSIS — R10.13 EPIGASTRIC PAIN: ICD-10-CM

## 2017-06-12 DIAGNOSIS — M54.2 CERVICALGIA: ICD-10-CM

## 2017-06-12 RX ORDER — SUCRALFATE 1 G/1
1 TABLET ORAL 4 TIMES DAILY PRN
Qty: 30 TABLET | Refills: 0 | Status: CANCELLED | OUTPATIENT
Start: 2017-06-12

## 2017-06-12 RX ORDER — HYDROCODONE BITARTRATE AND ACETAMINOPHEN 10; 325 MG/1; MG/1
1 TABLET ORAL 4 TIMES DAILY
Qty: 120 TABLET | Refills: 0 | Status: SHIPPED | OUTPATIENT
Start: 2017-06-12 | End: 2017-07-11 | Stop reason: SDUPTHER

## 2017-06-25 ENCOUNTER — HOSPITAL ENCOUNTER (EMERGENCY)
Age: 57
Discharge: HOME | End: 2017-06-25
Attending: EMERGENCY MEDICINE
Payer: MEDICARE

## 2017-06-25 VITALS
RESPIRATION RATE: 16 BRPM | BODY MASS INDEX: 31.37 KG/M2 | HEART RATE: 97 BPM | OXYGEN SATURATION: 98 % | WEIGHT: 166 LBS | SYSTOLIC BLOOD PRESSURE: 129 MMHG | DIASTOLIC BLOOD PRESSURE: 80 MMHG | TEMPERATURE: 98.6 F

## 2017-06-25 DIAGNOSIS — G89.29 OTHER CHRONIC PAIN: Primary | ICD-10-CM

## 2017-06-25 LAB
ABSOLUTE EOS #: 0.4 K/UL (ref 0–0.4)
ABSOLUTE LYMPH #: 1.7 K/UL (ref 1–4.8)
ABSOLUTE MONO #: 0.7 K/UL (ref 0.1–1.2)
ANION GAP SERPL CALCULATED.3IONS-SCNC: 13 MMOL/L (ref 9–17)
BASOPHILS # BLD: 1 %
BASOPHILS ABSOLUTE: 0 K/UL (ref 0–0.2)
BUN BLDV-MCNC: 10 MG/DL (ref 6–20)
BUN/CREAT BLD: 15 (ref 9–20)
CALCIUM SERPL-MCNC: 9.2 MG/DL (ref 8.6–10.4)
CHLORIDE BLD-SCNC: 102 MMOL/L (ref 98–107)
CO2: 24 MMOL/L (ref 20–31)
CREAT SERPL-MCNC: 0.67 MG/DL (ref 0.5–0.9)
DIFFERENTIAL TYPE: ABNORMAL
EOSINOPHILS RELATIVE PERCENT: 5 %
GFR AFRICAN AMERICAN: >60 ML/MIN
GFR NON-AFRICAN AMERICAN: >60 ML/MIN
GFR SERPL CREATININE-BSD FRML MDRD: NORMAL ML/MIN/{1.73_M2}
GFR SERPL CREATININE-BSD FRML MDRD: NORMAL ML/MIN/{1.73_M2}
GLUCOSE BLD-MCNC: 92 MG/DL (ref 70–99)
HCT VFR BLD CALC: 35.8 % (ref 36–46)
HEMOGLOBIN: 11.8 G/DL (ref 12–16)
LYMPHOCYTES # BLD: 23 %
MCH RBC QN AUTO: 27.2 PG (ref 26–34)
MCHC RBC AUTO-ENTMCNC: 32.9 G/DL (ref 31–37)
MCV RBC AUTO: 82.9 FL (ref 80–100)
MONOCYTES # BLD: 10 %
PDW BLD-RTO: 14.4 % (ref 11–14.5)
PLATELET # BLD: 350 K/UL (ref 140–450)
PLATELET ESTIMATE: ABNORMAL
PMV BLD AUTO: 7.9 FL (ref 6–12)
POTASSIUM SERPL-SCNC: 4 MMOL/L (ref 3.7–5.3)
RBC # BLD: 4.33 M/UL (ref 4–5.2)
RBC # BLD: ABNORMAL 10*6/UL
SEG NEUTROPHILS: 61 %
SEGMENTED NEUTROPHILS ABSOLUTE COUNT: 4.6 K/UL (ref 1.8–7.7)
SODIUM BLD-SCNC: 139 MMOL/L (ref 135–144)
TOTAL CK: 323 U/L (ref 26–192)
TROPONIN INTERP: NORMAL
TROPONIN T: <0.03 NG/ML
WBC # BLD: 7.5 K/UL (ref 3.5–11)
WBC # BLD: ABNORMAL 10*3/UL

## 2017-06-25 PROCEDURE — 6360000002 HC RX W HCPCS: Performed by: EMERGENCY MEDICINE

## 2017-06-25 PROCEDURE — 36415 COLL VENOUS BLD VENIPUNCTURE: CPT

## 2017-06-25 PROCEDURE — 84484 ASSAY OF TROPONIN QUANT: CPT

## 2017-06-25 PROCEDURE — 93005 ELECTROCARDIOGRAM TRACING: CPT

## 2017-06-25 PROCEDURE — 99283 EMERGENCY DEPT VISIT LOW MDM: CPT

## 2017-06-25 PROCEDURE — 80048 BASIC METABOLIC PNL TOTAL CA: CPT

## 2017-06-25 PROCEDURE — 6370000000 HC RX 637 (ALT 250 FOR IP): Performed by: EMERGENCY MEDICINE

## 2017-06-25 PROCEDURE — 82550 ASSAY OF CK (CPK): CPT

## 2017-06-25 PROCEDURE — 85025 COMPLETE CBC W/AUTO DIFF WBC: CPT

## 2017-06-25 PROCEDURE — 96372 THER/PROPH/DIAG INJ SC/IM: CPT

## 2017-06-25 RX ORDER — ASPIRIN 81 MG/1
324 TABLET, CHEWABLE ORAL ONCE
Status: COMPLETED | OUTPATIENT
Start: 2017-06-25 | End: 2017-06-25

## 2017-06-25 RX ORDER — KETOROLAC TROMETHAMINE 30 MG/ML
30 INJECTION, SOLUTION INTRAMUSCULAR; INTRAVENOUS ONCE
Status: COMPLETED | OUTPATIENT
Start: 2017-06-25 | End: 2017-06-25

## 2017-06-25 RX ADMIN — ASPIRIN 81 MG 324 MG: 81 TABLET ORAL at 19:04

## 2017-06-25 RX ADMIN — KETOROLAC TROMETHAMINE 30 MG: 30 INJECTION, SOLUTION INTRAMUSCULAR at 19:04

## 2017-06-25 ASSESSMENT — PAIN SCALES - GENERAL
PAINLEVEL_OUTOF10: 10
PAINLEVEL_OUTOF10: 9

## 2017-06-25 ASSESSMENT — PAIN DESCRIPTION - DESCRIPTORS: DESCRIPTORS: STABBING

## 2017-06-26 LAB
EKG ATRIAL RATE: 101 BPM
EKG P AXIS: 52 DEGREES
EKG P-R INTERVAL: 140 MS
EKG Q-T INTERVAL: 372 MS
EKG QRS DURATION: 78 MS
EKG QTC CALCULATION (BAZETT): 482 MS
EKG R AXIS: 15 DEGREES
EKG T AXIS: 44 DEGREES
EKG VENTRICULAR RATE: 101 BPM

## 2017-07-11 DIAGNOSIS — M32.9 LUPUS (HCC): ICD-10-CM

## 2017-07-11 DIAGNOSIS — M79.7 FIBROMYALGIA: ICD-10-CM

## 2017-07-11 DIAGNOSIS — M25.551 RIGHT HIP PAIN: ICD-10-CM

## 2017-07-11 DIAGNOSIS — M54.2 CERVICALGIA: ICD-10-CM

## 2017-07-12 DIAGNOSIS — R10.13 EPIGASTRIC PAIN: ICD-10-CM

## 2017-07-12 RX ORDER — HYDROCODONE BITARTRATE AND ACETAMINOPHEN 10; 325 MG/1; MG/1
1 TABLET ORAL 4 TIMES DAILY
Qty: 120 TABLET | Refills: 0 | Status: SHIPPED | OUTPATIENT
Start: 2017-07-12 | End: 2017-08-11 | Stop reason: SDUPTHER

## 2017-07-13 RX ORDER — SUCRALFATE 1 G/1
1 TABLET ORAL 4 TIMES DAILY PRN
Qty: 60 TABLET | Refills: 0 | Status: SHIPPED | OUTPATIENT
Start: 2017-07-13 | End: 2020-09-16 | Stop reason: ALTCHOICE

## 2017-08-01 ENCOUNTER — OFFICE VISIT (OUTPATIENT)
Dept: FAMILY MEDICINE CLINIC | Age: 57
End: 2017-08-01
Payer: MEDICARE

## 2017-08-01 VITALS
BODY MASS INDEX: 31.34 KG/M2 | SYSTOLIC BLOOD PRESSURE: 120 MMHG | OXYGEN SATURATION: 98 % | DIASTOLIC BLOOD PRESSURE: 70 MMHG | HEART RATE: 97 BPM | HEIGHT: 61 IN | WEIGHT: 166 LBS

## 2017-08-01 DIAGNOSIS — R07.9 CHEST PAIN, UNSPECIFIED TYPE: ICD-10-CM

## 2017-08-01 DIAGNOSIS — I10 ESSENTIAL HYPERTENSION, BENIGN: ICD-10-CM

## 2017-08-01 DIAGNOSIS — M62.838 MUSCLE SPASM: ICD-10-CM

## 2017-08-01 DIAGNOSIS — M54.6 ACUTE LEFT-SIDED THORACIC BACK PAIN: Primary | ICD-10-CM

## 2017-08-01 DIAGNOSIS — M54.2 CHRONIC NECK PAIN: ICD-10-CM

## 2017-08-01 DIAGNOSIS — K21.9 GASTROESOPHAGEAL REFLUX DISEASE, ESOPHAGITIS PRESENCE NOT SPECIFIED: ICD-10-CM

## 2017-08-01 DIAGNOSIS — E55.9 VITAMIN D DEFICIENCY: ICD-10-CM

## 2017-08-01 DIAGNOSIS — M47.812 FACET ARTHRITIS OF CERVICAL REGION: ICD-10-CM

## 2017-08-01 DIAGNOSIS — R10.13 EPIGASTRIC PAIN: ICD-10-CM

## 2017-08-01 DIAGNOSIS — G89.29 CHRONIC NECK PAIN: ICD-10-CM

## 2017-08-01 PROCEDURE — 3017F COLORECTAL CA SCREEN DOC REV: CPT | Performed by: FAMILY MEDICINE

## 2017-08-01 PROCEDURE — 3014F SCREEN MAMMO DOC REV: CPT | Performed by: FAMILY MEDICINE

## 2017-08-01 PROCEDURE — G8427 DOCREV CUR MEDS BY ELIG CLIN: HCPCS | Performed by: FAMILY MEDICINE

## 2017-08-01 PROCEDURE — 99214 OFFICE O/P EST MOD 30 MIN: CPT | Performed by: FAMILY MEDICINE

## 2017-08-01 PROCEDURE — G8417 CALC BMI ABV UP PARAM F/U: HCPCS | Performed by: FAMILY MEDICINE

## 2017-08-01 PROCEDURE — 93000 ELECTROCARDIOGRAM COMPLETE: CPT | Performed by: FAMILY MEDICINE

## 2017-08-01 PROCEDURE — 1036F TOBACCO NON-USER: CPT | Performed by: FAMILY MEDICINE

## 2017-08-01 PROCEDURE — 96372 THER/PROPH/DIAG INJ SC/IM: CPT | Performed by: FAMILY MEDICINE

## 2017-08-01 RX ORDER — DOCUSATE SODIUM 100 MG/1
100 CAPSULE, LIQUID FILLED ORAL 2 TIMES DAILY
Qty: 60 CAPSULE | Refills: 5 | Status: SHIPPED | OUTPATIENT
Start: 2017-08-01

## 2017-08-01 RX ORDER — OMEPRAZOLE 20 MG/1
20 CAPSULE, DELAYED RELEASE ORAL DAILY
Qty: 30 CAPSULE | Refills: 5 | Status: SHIPPED | OUTPATIENT
Start: 2017-08-01 | End: 2021-07-08

## 2017-08-01 RX ORDER — RANITIDINE 150 MG/1
150 TABLET ORAL NIGHTLY
Qty: 30 TABLET | Refills: 5 | Status: SHIPPED | OUTPATIENT
Start: 2017-08-01 | End: 2020-09-16

## 2017-08-01 RX ORDER — LISINOPRIL 10 MG/1
10 TABLET ORAL DAILY
Qty: 30 TABLET | Refills: 5 | Status: SHIPPED | OUTPATIENT
Start: 2017-08-01 | End: 2018-05-26 | Stop reason: SDUPTHER

## 2017-08-01 RX ORDER — SUCRALFATE 1 G/1
1 TABLET ORAL 4 TIMES DAILY PRN
Qty: 60 TABLET | Refills: 0 | Status: CANCELLED | OUTPATIENT
Start: 2017-08-01

## 2017-08-01 ASSESSMENT — ENCOUNTER SYMPTOMS
BACK PAIN: 1
EYE REDNESS: 1
EYE DISCHARGE: 0

## 2017-08-03 ENCOUNTER — EVALUATION (OUTPATIENT)
Dept: PHYSICAL THERAPY | Age: 57
End: 2017-08-03
Payer: MEDICARE

## 2017-08-03 DIAGNOSIS — M54.2 CERVICALGIA: Primary | ICD-10-CM

## 2017-08-03 PROCEDURE — G8982 BODY POS GOAL STATUS: HCPCS | Performed by: PHYSICAL THERAPIST

## 2017-08-03 PROCEDURE — 97140 MANUAL THERAPY 1/> REGIONS: CPT | Performed by: PHYSICAL THERAPIST

## 2017-08-03 PROCEDURE — 97162 PT EVAL MOD COMPLEX 30 MIN: CPT | Performed by: PHYSICAL THERAPIST

## 2017-08-03 PROCEDURE — 97110 THERAPEUTIC EXERCISES: CPT | Performed by: PHYSICAL THERAPIST

## 2017-08-03 PROCEDURE — G8981 BODY POS CURRENT STATUS: HCPCS | Performed by: PHYSICAL THERAPIST

## 2017-08-03 ASSESSMENT — PAIN DESCRIPTION - LOCATION: LOCATION: NECK;SHOULDER;ARM

## 2017-08-03 ASSESSMENT — PAIN DESCRIPTION - FREQUENCY: FREQUENCY: CONTINUOUS

## 2017-08-03 ASSESSMENT — PAIN DESCRIPTION - PROGRESSION: CLINICAL_PROGRESSION: NOT CHANGED

## 2017-08-03 ASSESSMENT — PAIN SCALES - GENERAL: PAINLEVEL_OUTOF10: 8

## 2017-08-03 ASSESSMENT — PAIN DESCRIPTION - DESCRIPTORS: DESCRIPTORS: ACHING;CONSTANT

## 2017-08-03 ASSESSMENT — PAIN DESCRIPTION - PAIN TYPE: TYPE: CHRONIC PAIN

## 2017-08-03 ASSESSMENT — PAIN DESCRIPTION - ONSET: ONSET: ON-GOING

## 2017-08-03 ASSESSMENT — PAIN DESCRIPTION - ORIENTATION: ORIENTATION: LEFT

## 2017-08-08 ENCOUNTER — TREATMENT (OUTPATIENT)
Dept: PHYSICAL THERAPY | Age: 57
End: 2017-08-08
Payer: MEDICARE

## 2017-08-08 DIAGNOSIS — M54.2 CERVICALGIA: Primary | ICD-10-CM

## 2017-08-08 PROCEDURE — 97012 MECHANICAL TRACTION THERAPY: CPT | Performed by: PHYSICAL THERAPIST

## 2017-08-08 PROCEDURE — 97110 THERAPEUTIC EXERCISES: CPT | Performed by: PHYSICAL THERAPIST

## 2017-08-11 ENCOUNTER — TREATMENT (OUTPATIENT)
Dept: PHYSICAL THERAPY | Age: 57
End: 2017-08-11
Payer: MEDICARE

## 2017-08-11 DIAGNOSIS — M54.2 CERVICALGIA: Primary | ICD-10-CM

## 2017-08-11 DIAGNOSIS — M54.2 CERVICALGIA: ICD-10-CM

## 2017-08-11 DIAGNOSIS — M25.551 RIGHT HIP PAIN: ICD-10-CM

## 2017-08-11 DIAGNOSIS — M79.7 FIBROMYALGIA: ICD-10-CM

## 2017-08-11 PROCEDURE — 97110 THERAPEUTIC EXERCISES: CPT | Performed by: PHYSICAL THERAPIST

## 2017-08-11 PROCEDURE — 97012 MECHANICAL TRACTION THERAPY: CPT | Performed by: PHYSICAL THERAPIST

## 2017-08-11 RX ORDER — HYDROCODONE BITARTRATE AND ACETAMINOPHEN 10; 325 MG/1; MG/1
1 TABLET ORAL 4 TIMES DAILY
Qty: 120 TABLET | Refills: 0 | Status: SHIPPED | OUTPATIENT
Start: 2017-08-11 | End: 2017-09-11 | Stop reason: SDUPTHER

## 2017-08-11 RX ORDER — CYCLOBENZAPRINE HCL 10 MG
10 TABLET ORAL 3 TIMES DAILY PRN
Qty: 90 TABLET | Refills: 11 | Status: SHIPPED | OUTPATIENT
Start: 2017-08-11 | End: 2018-04-16 | Stop reason: SDUPTHER

## 2017-08-16 ENCOUNTER — HOSPITAL ENCOUNTER (OUTPATIENT)
Dept: PHYSICAL THERAPY | Age: 57
Setting detail: THERAPIES SERIES
Discharge: HOME | End: 2017-08-16

## 2017-08-18 ENCOUNTER — HOSPITAL ENCOUNTER (OUTPATIENT)
Dept: PHYSICAL THERAPY | Age: 57
Setting detail: THERAPIES SERIES
Discharge: HOME | End: 2017-08-18
Payer: MEDICARE

## 2017-08-18 PROCEDURE — 97110 THERAPEUTIC EXERCISES: CPT

## 2017-08-18 PROCEDURE — 97012 MECHANICAL TRACTION THERAPY: CPT

## 2017-08-22 ENCOUNTER — HOSPITAL ENCOUNTER (OUTPATIENT)
Dept: PHYSICAL THERAPY | Age: 57
Setting detail: THERAPIES SERIES
Discharge: HOME | End: 2017-08-22
Payer: MEDICARE

## 2017-09-07 ENCOUNTER — TELEPHONE (OUTPATIENT)
Dept: FAMILY MEDICINE CLINIC | Age: 57
End: 2017-09-07

## 2017-09-07 DIAGNOSIS — M54.12 CERVICAL RADICULAR PAIN: ICD-10-CM

## 2017-09-07 DIAGNOSIS — M54.2 CERVICALGIA: ICD-10-CM

## 2017-09-07 DIAGNOSIS — M32.9 SYSTEMIC LUPUS ERYTHEMATOSUS, UNSPECIFIED SLE TYPE, UNSPECIFIED ORGAN INVOLVEMENT STATUS (HCC): ICD-10-CM

## 2017-09-07 DIAGNOSIS — M79.7 FIBROMYALGIA: Primary | ICD-10-CM

## 2017-09-07 DIAGNOSIS — M79.7 FIBROMYALGIA: ICD-10-CM

## 2017-09-07 DIAGNOSIS — M47.812 FACET ARTHRITIS OF CERVICAL REGION: ICD-10-CM

## 2017-09-07 DIAGNOSIS — M25.551 RIGHT HIP PAIN: ICD-10-CM

## 2017-09-07 DIAGNOSIS — R10.12 LEFT UPPER QUADRANT PAIN: ICD-10-CM

## 2017-09-07 DIAGNOSIS — M32.9 SYSTEMIC LUPUS ERYTHEMATOSUS, UNSPECIFIED SLE TYPE, UNSPECIFIED ORGAN INVOLVEMENT STATUS (HCC): Primary | ICD-10-CM

## 2017-09-07 DIAGNOSIS — M25.512 CHRONIC LEFT SHOULDER PAIN: ICD-10-CM

## 2017-09-07 DIAGNOSIS — G89.29 CHRONIC LEFT SHOULDER PAIN: ICD-10-CM

## 2017-09-11 DIAGNOSIS — M79.7 FIBROMYALGIA: ICD-10-CM

## 2017-09-11 DIAGNOSIS — M54.2 CERVICALGIA: ICD-10-CM

## 2017-09-11 DIAGNOSIS — M25.551 RIGHT HIP PAIN: ICD-10-CM

## 2017-09-12 RX ORDER — HYDROCODONE BITARTRATE AND ACETAMINOPHEN 10; 325 MG/1; MG/1
1 TABLET ORAL 4 TIMES DAILY
Qty: 120 TABLET | Refills: 0 | Status: SHIPPED | OUTPATIENT
Start: 2017-09-12 | End: 2017-10-11 | Stop reason: SDUPTHER

## 2017-10-11 DIAGNOSIS — M54.2 CERVICALGIA: ICD-10-CM

## 2017-10-11 DIAGNOSIS — M25.551 RIGHT HIP PAIN: ICD-10-CM

## 2017-10-11 DIAGNOSIS — M79.7 FIBROMYALGIA: ICD-10-CM

## 2017-10-11 RX ORDER — HYDROCODONE BITARTRATE AND ACETAMINOPHEN 10; 325 MG/1; MG/1
1 TABLET ORAL 4 TIMES DAILY
Qty: 120 TABLET | Refills: 0 | Status: SHIPPED | OUTPATIENT
Start: 2017-10-12 | End: 2017-11-13 | Stop reason: SDUPTHER

## 2017-11-13 DIAGNOSIS — M54.2 CERVICALGIA: ICD-10-CM

## 2017-11-13 DIAGNOSIS — M79.7 FIBROMYALGIA: ICD-10-CM

## 2017-11-13 DIAGNOSIS — M25.551 RIGHT HIP PAIN: ICD-10-CM

## 2017-11-13 RX ORDER — HYDROCODONE BITARTRATE AND ACETAMINOPHEN 10; 325 MG/1; MG/1
1 TABLET ORAL 4 TIMES DAILY
Qty: 120 TABLET | Refills: 0 | Status: SHIPPED | OUTPATIENT
Start: 2017-11-13 | End: 2017-11-27 | Stop reason: SDUPTHER

## 2017-11-13 NOTE — TELEPHONE ENCOUNTER
Pt called refill line for Norco.   Last Appt:  5/22/2017  Next Appt:   Visit date not found  Med verified in [address] [place] [address] checked for [place], and [place]: 10/12/2017 [place] #120. Due Now.

## 2017-11-27 ENCOUNTER — OFFICE VISIT (OUTPATIENT)
Dept: FAMILY MEDICINE CLINIC | Age: 57
End: 2017-11-27
Payer: MEDICARE

## 2017-11-27 ENCOUNTER — OFFICE VISIT (OUTPATIENT)
Dept: PAIN MANAGEMENT | Age: 57
End: 2017-11-27
Payer: MEDICARE

## 2017-11-27 ENCOUNTER — HOSPITAL ENCOUNTER (OUTPATIENT)
Dept: LAB | Age: 57
Setting detail: SPECIMEN
Discharge: HOME | End: 2017-11-27
Payer: MEDICARE

## 2017-11-27 VITALS
BODY MASS INDEX: 31.18 KG/M2 | HEART RATE: 96 BPM | DIASTOLIC BLOOD PRESSURE: 64 MMHG | OXYGEN SATURATION: 94 % | WEIGHT: 165 LBS | SYSTOLIC BLOOD PRESSURE: 118 MMHG

## 2017-11-27 VITALS
DIASTOLIC BLOOD PRESSURE: 88 MMHG | SYSTOLIC BLOOD PRESSURE: 130 MMHG | HEIGHT: 60 IN | HEART RATE: 76 BPM | BODY MASS INDEX: 32.86 KG/M2 | WEIGHT: 167.4 LBS

## 2017-11-27 DIAGNOSIS — E78.1 HYPERTRIGLYCERIDEMIA: ICD-10-CM

## 2017-11-27 DIAGNOSIS — R10.9 BILATERAL FLANK PAIN: ICD-10-CM

## 2017-11-27 DIAGNOSIS — Z02.83 ENCOUNTER FOR DRUG SCREENING: ICD-10-CM

## 2017-11-27 DIAGNOSIS — M79.7 FIBROMYALGIA: ICD-10-CM

## 2017-11-27 DIAGNOSIS — E55.9 VITAMIN D DEFICIENCY: ICD-10-CM

## 2017-11-27 DIAGNOSIS — I10 ESSENTIAL HYPERTENSION: Primary | ICD-10-CM

## 2017-11-27 DIAGNOSIS — G89.29 ENCOUNTER FOR CHRONIC PAIN MANAGEMENT: Primary | ICD-10-CM

## 2017-11-27 DIAGNOSIS — I10 ESSENTIAL HYPERTENSION: ICD-10-CM

## 2017-11-27 DIAGNOSIS — M54.2 CERVICALGIA: ICD-10-CM

## 2017-11-27 DIAGNOSIS — Z12.11 SCREEN FOR COLON CANCER: ICD-10-CM

## 2017-11-27 DIAGNOSIS — D64.9 ANEMIA, UNSPECIFIED TYPE: ICD-10-CM

## 2017-11-27 DIAGNOSIS — Z79.891 ENCOUNTER FOR LONG-TERM OPIATE ANALGESIC USE: ICD-10-CM

## 2017-11-27 DIAGNOSIS — R04.0 RECURRENT EPISTAXIS: ICD-10-CM

## 2017-11-27 DIAGNOSIS — Z13.1 SCREENING FOR DIABETES MELLITUS: ICD-10-CM

## 2017-11-27 DIAGNOSIS — R05.3 PERSISTENT COUGH: ICD-10-CM

## 2017-11-27 DIAGNOSIS — M25.551 RIGHT HIP PAIN: ICD-10-CM

## 2017-11-27 LAB
-: ABNORMAL
ABSOLUTE EOS #: 0.3 K/UL (ref 0–0.4)
ABSOLUTE IMMATURE GRANULOCYTE: ABNORMAL K/UL (ref 0–0.3)
ABSOLUTE LYMPH #: 1.7 K/UL (ref 1–4.8)
ABSOLUTE MONO #: 0.5 K/UL (ref 0.1–1.2)
ALBUMIN SERPL-MCNC: 4.2 G/DL (ref 3.5–5.2)
ALBUMIN/GLOBULIN RATIO: 1.2 (ref 1–2.5)
ALP BLD-CCNC: 75 U/L (ref 35–104)
ALT SERPL-CCNC: 11 U/L (ref 5–33)
AMORPHOUS: ABNORMAL
ANION GAP SERPL CALCULATED.3IONS-SCNC: 14 MMOL/L (ref 9–17)
AST SERPL-CCNC: 19 U/L
BACTERIA: ABNORMAL
BASOPHILS # BLD: 1 % (ref 0–1)
BASOPHILS ABSOLUTE: 0 K/UL (ref 0–0.2)
BILIRUB SERPL-MCNC: 0.21 MG/DL (ref 0.3–1.2)
BILIRUBIN URINE: NEGATIVE
BUN BLDV-MCNC: 12 MG/DL (ref 6–20)
BUN/CREAT BLD: 21 (ref 9–20)
CALCIUM SERPL-MCNC: 9.5 MG/DL (ref 8.6–10.4)
CASTS UA: ABNORMAL /LPF (ref 0–2)
CHLORIDE BLD-SCNC: 103 MMOL/L (ref 98–107)
CHOLESTEROL/HDL RATIO: 5
CHOLESTEROL: 180 MG/DL
CO2: 24 MMOL/L (ref 20–31)
COLOR: NORMAL
COMMENT UA: NORMAL
CREAT SERPL-MCNC: 0.58 MG/DL (ref 0.5–0.9)
CRYSTALS, UA: ABNORMAL /HPF
DIFFERENTIAL TYPE: ABNORMAL
EOSINOPHILS RELATIVE PERCENT: 5 % (ref 1–7)
EPITHELIAL CELLS UA: ABNORMAL /HPF (ref 0–5)
GFR AFRICAN AMERICAN: >60 ML/MIN
GFR NON-AFRICAN AMERICAN: >60 ML/MIN
GFR SERPL CREATININE-BSD FRML MDRD: ABNORMAL ML/MIN/{1.73_M2}
GFR SERPL CREATININE-BSD FRML MDRD: ABNORMAL ML/MIN/{1.73_M2}
GLUCOSE BLD-MCNC: 85 MG/DL (ref 70–99)
GLUCOSE URINE: NEGATIVE
HCT VFR BLD CALC: 36.3 % (ref 36–46)
HDLC SERPL-MCNC: 36 MG/DL
HEMOGLOBIN: 11.7 G/DL (ref 12–16)
IMMATURE GRANULOCYTES: ABNORMAL %
KETONES, URINE: NEGATIVE
LDL CHOLESTEROL: 95 MG/DL (ref 0–130)
LEUKOCYTE ESTERASE, URINE: NEGATIVE
LYMPHOCYTES # BLD: 31 % (ref 16–46)
MCH RBC QN AUTO: 27 PG (ref 26–34)
MCHC RBC AUTO-ENTMCNC: 32.3 G/DL (ref 31–37)
MCV RBC AUTO: 83.7 FL (ref 80–100)
MONOCYTES # BLD: 9 % (ref 4–11)
MUCUS: ABNORMAL
NITRITE, URINE: NEGATIVE
OTHER OBSERVATIONS UA: ABNORMAL
PDW BLD-RTO: 14.9 % (ref 11–14.5)
PH UA: 6 (ref 5–6)
PLATELET # BLD: 340 K/UL (ref 140–450)
PLATELET ESTIMATE: ABNORMAL
PMV BLD AUTO: 8 FL (ref 6–12)
POTASSIUM SERPL-SCNC: 3.9 MMOL/L (ref 3.7–5.3)
PROTEIN UA: NEGATIVE
RBC # BLD: 4.34 M/UL (ref 4–5.2)
RBC # BLD: ABNORMAL 10*6/UL
RBC UA: ABNORMAL /HPF (ref 0–4)
RENAL EPITHELIAL, UA: ABNORMAL /HPF
SEG NEUTROPHILS: 54 % (ref 43–77)
SEGMENTED NEUTROPHILS ABSOLUTE COUNT: 2.9 K/UL (ref 1.8–7.7)
SODIUM BLD-SCNC: 141 MMOL/L (ref 135–144)
SPECIFIC GRAVITY UA: 1.02 (ref 1.01–1.02)
TOTAL PROTEIN: 7.8 G/DL (ref 6.4–8.3)
TRICHOMONAS: ABNORMAL
TRIGL SERPL-MCNC: 243 MG/DL
TURBIDITY: NORMAL
URINE HGB: NEGATIVE
UROBILINOGEN, URINE: NORMAL
VITAMIN D 25-HYDROXY: 25.3 NG/ML (ref 30–100)
VLDLC SERPL CALC-MCNC: ABNORMAL MG/DL (ref 1–30)
WBC # BLD: 5.3 K/UL (ref 3.5–11)
WBC # BLD: ABNORMAL 10*3/UL
WBC UA: ABNORMAL /HPF (ref 0–4)
YEAST: ABNORMAL

## 2017-11-27 PROCEDURE — 82306 VITAMIN D 25 HYDROXY: CPT

## 2017-11-27 PROCEDURE — 81001 URINALYSIS AUTO W/SCOPE: CPT

## 2017-11-27 PROCEDURE — 1036F TOBACCO NON-USER: CPT | Performed by: NURSE PRACTITIONER

## 2017-11-27 PROCEDURE — 3014F SCREEN MAMMO DOC REV: CPT | Performed by: FAMILY MEDICINE

## 2017-11-27 PROCEDURE — 3017F COLORECTAL CA SCREEN DOC REV: CPT | Performed by: NURSE PRACTITIONER

## 2017-11-27 PROCEDURE — 85025 COMPLETE CBC W/AUTO DIFF WBC: CPT

## 2017-11-27 PROCEDURE — 80061 LIPID PANEL: CPT

## 2017-11-27 PROCEDURE — G8484 FLU IMMUNIZE NO ADMIN: HCPCS | Performed by: NURSE PRACTITIONER

## 2017-11-27 PROCEDURE — 1036F TOBACCO NON-USER: CPT | Performed by: FAMILY MEDICINE

## 2017-11-27 PROCEDURE — G8484 FLU IMMUNIZE NO ADMIN: HCPCS | Performed by: FAMILY MEDICINE

## 2017-11-27 PROCEDURE — 99214 OFFICE O/P EST MOD 30 MIN: CPT | Performed by: FAMILY MEDICINE

## 2017-11-27 PROCEDURE — G8427 DOCREV CUR MEDS BY ELIG CLIN: HCPCS | Performed by: NURSE PRACTITIONER

## 2017-11-27 PROCEDURE — 80307 DRUG TEST PRSMV CHEM ANLYZR: CPT

## 2017-11-27 PROCEDURE — G8427 DOCREV CUR MEDS BY ELIG CLIN: HCPCS | Performed by: FAMILY MEDICINE

## 2017-11-27 PROCEDURE — 3014F SCREEN MAMMO DOC REV: CPT | Performed by: NURSE PRACTITIONER

## 2017-11-27 PROCEDURE — G8417 CALC BMI ABV UP PARAM F/U: HCPCS | Performed by: FAMILY MEDICINE

## 2017-11-27 PROCEDURE — 99214 OFFICE O/P EST MOD 30 MIN: CPT | Performed by: NURSE PRACTITIONER

## 2017-11-27 PROCEDURE — G8417 CALC BMI ABV UP PARAM F/U: HCPCS | Performed by: NURSE PRACTITIONER

## 2017-11-27 PROCEDURE — 3017F COLORECTAL CA SCREEN DOC REV: CPT | Performed by: FAMILY MEDICINE

## 2017-11-27 PROCEDURE — 80053 COMPREHEN METABOLIC PANEL: CPT

## 2017-11-27 PROCEDURE — 36415 COLL VENOUS BLD VENIPUNCTURE: CPT

## 2017-11-27 RX ORDER — HYDROCODONE BITARTRATE AND ACETAMINOPHEN 10; 325 MG/1; MG/1
1 TABLET ORAL 4 TIMES DAILY
Qty: 120 TABLET | Refills: 0 | Status: SHIPPED | OUTPATIENT
Start: 2017-12-13 | End: 2017-12-12 | Stop reason: SDUPTHER

## 2017-11-27 RX ORDER — BENZONATATE 200 MG/1
200 CAPSULE ORAL 3 TIMES DAILY PRN
Qty: 30 CAPSULE | Refills: 1 | Status: SHIPPED | OUTPATIENT
Start: 2017-11-27 | End: 2018-01-22 | Stop reason: ALTCHOICE

## 2017-11-27 RX ORDER — DEXTROMETHORPHAN HYDROBROMIDE AND PROMETHAZINE HYDROCHLORIDE 15; 6.25 MG/5ML; MG/5ML
5 SYRUP ORAL NIGHTLY PRN
Qty: 50 ML | Refills: 0 | Status: SHIPPED | OUTPATIENT
Start: 2017-11-27 | End: 2018-01-22 | Stop reason: ALTCHOICE

## 2017-11-27 ASSESSMENT — ENCOUNTER SYMPTOMS
COUGH: 1
WHEEZING: 0
SHORTNESS OF BREATH: 0
ABDOMINAL PAIN: 1
SORE THROAT: 1
SINUS PRESSURE: 0

## 2017-11-27 NOTE — PROGRESS NOTES
LETHA Brown 98  [address], [address] Rhea [place]  [phone]      Britt Hernandez is a 59 y.o. female who presents today for her medical conditions/complaints as noted below. Britt Hernandez is c/o of Neck Pain (Physical Therapy follow-up. ) and Cough (reports dry cough for over 1 month. )      HPI:     Pt here today for follow-up of back and neck pain, and new cough. Pt has been having worsened low back pain x past 6 weeks - bilateral.  Feels like a dull ache, and then will get \"sharp pains\" intermittently. Had a kidney infection years ago, and she had pain in the same area that felt similar, but this pain is not as severe. Due to this, she has been drinking more water and cranberry juice recently. Denies dysuria or hematuria, but has noticed some increased frequency over the past month. Also using heating pad as needed. Pt has had cough x past 6 weeks - initially started as a bronchitis-type infection, with \"burning\" in her chest, but this is now improved, except that her cough is lingering. Had old Rx of Cheratussin at home - took this, which really helped for 3-4 days. She is now back to taking Robitussin, but feels that this makes her drowsy and tired during the day. Has to sit propped up, due to her cough. Cough is productive of green sputum; also having some sore throat with her cough. Pt has chronic anemia - used to take Ferrous sulfate in the past, but stopped this, as it made her feel nauseous and constipated. Last Hgb during ER visit on 6/25 was 11.8.       Taking Omeprazole 20 mg daily in the morning and Zantac 150 mg nightly for GERD - working well for her. No reflux on this regimen.     Taking Norco  mg 2-3x's daily for her hip and other joint/muscle pain due to lupus and fibromyalgia - prescribed by Mick Lara in Pain Mgmt. Also taking Flexeril 10 mg TID-QID for these pains.   Had appt with Rheumatology for her lupus (in [place]) Arm   Position: Sitting   Cuff Size: Large Adult   Pulse: 96   SpO2: 94%   Weight: 165 lb (74.8 kg)     Physical Exam   Constitutional: She is oriented to person, place, and time. She appears well-developed and well-nourished. No distress. HENT:   Head: Normocephalic and atraumatic. Very small scabbed area noted in R nare   Eyes: Conjunctivae are normal.   Neck: Neck supple. Cardiovascular: Normal rate, regular rhythm and normal heart sounds. Pulmonary/Chest: Effort normal and breath sounds normal. No respiratory distress. Abdominal: Soft. Bowel sounds are normal. She exhibits no distension. There is no tenderness. There is CVA tenderness (b/l). Musculoskeletal: She exhibits no edema. Neurological: She is alert and oriented to person, place, and time. Skin: Skin is warm and dry. Psychiatric: She has a normal mood and affect. Assessment:      1. Essential hypertension  Comprehensive Metabolic Panel   2. Anemia, unspecified type  CBC Auto Differential   3. Hypertriglyceridemia  Lipid Panel   4. Bilateral flank pain  UA W/REFLEX CULTURE   5. Vitamin D deficiency  Vitamin D 25 Hydroxy   6. Persistent cough  benzonatate (TESSALON) 200 MG capsule    promethazine-dextromethorphan (PROMETHAZINE-DM) 6.25-15 MG/5ML syrup   7. Recurrent epistaxis  Jana Harry MD, ENT, [place]   8. Screening for diabetes mellitus  Comprehensive Metabolic Panel   9. Screen for colon cancer  Pedro Alonso MD, General Surgery [place]         Plan:      Declined flu vaccine today. Return in about 6 months (around 5/27/2018) for Follow-up HTN.     Orders Placed This Encounter   Procedures    UA W/REFLEX CULTURE     Standing Status:   Future     Number of Occurrences:   1     Standing Expiration Date:   11/27/2018    Comprehensive Metabolic Panel     Standing Status:   Future     Number of Occurrences:   1     Standing Expiration Date:   11/27/2018    Lipid Panel     Standing Status:   Future Number of Occurrences:   1     Standing Expiration Date:   11/27/2018     Order Specific Question:   Is Patient Fasting?/# of Hours     Answer:   15    Vitamin D 25 Hydroxy     Standing Status:   Future     Number of Occurrences:   1     Standing Expiration Date:   11/27/2018    CBC Auto Differential     Standing Status:   Future     Number of Occurrences:   1     Standing Expiration Date:   11/27/2018   Robin Mcgarry MD, General Surgery [place]     Referral Priority:   Routine     Referral Type:   Consult for Advice and Opinion     Referral Reason:   Specialty Services Required     Referred to Provider:   Francis Fischer MD     Requested Specialty:   General Surgery     Number of Visits Requested:   Lovely Mcgregor MD, ENT, [place]     Referral Priority:   Routine     Referral Type:   Consult for Advice and Opinion     Referral Reason:   Specialty Services Required     Referred to Provider:   Bernie Lucio MD     Requested Specialty:   Otolaryngology     Number of Visits Requested:   1     Orders Placed This Encounter   Medications    benzonatate (TESSALON) 200 MG capsule     Sig: Take 1 capsule by mouth 3 times daily as needed for Cough     Dispense:  30 capsule     Refill:  1    promethazine-dextromethorphan (PROMETHAZINE-DM) 6.25-15 MG/5ML syrup     Sig: Take 5 mLs by mouth nightly as needed for Cough     Dispense:  50 mL     Refill:  0       Patient given educational materials - see patient instructions. Discussed use, benefit, and side effects of prescribed medications. All patient questions answered. Pt voiced understanding. Reviewed health maintenance.               Electronically signed by Megan Hernandez DO on 12/10/2017 at 11:51 PM

## 2017-11-27 NOTE — PATIENT INSTRUCTIONS
Pain Management Plan:  1. Continue current pain medications to improve quality of life and function. Pain clinic phone numbers  Refills  - Call [phone]. Please leave your name, a working phone number, date of birth, the name, dose, quantity, and last refill date of the prescription, and the pharmacy. Medication or Procedure Questions - Call [phone]. This is the direct line to the [facility] Pain Management Nurses. Appointment or General Questions - Call  [phone]. This is the direct line the  [address] [place] [address] can also use [employer]. Is your [employer] Activated? How to dispose of unused pain medications safely:  · Flush all unused pain medications. This is the FDA's recommended way of disposing these medications. · All other medications can be disposed in the trash mixed in undesirable contents (used coffee grounds, used [employer], etc).

## 2017-11-27 NOTE — PROGRESS NOTES
N/A 4/13/2017    EGD BIOPSY performed by Casey Adam MD at [address]  3/1/16    16 removed on bottom 1 on top     TUBAL LIGATION         Family History   Problem Relation Age of Onset    Diabetes Mother      Passed due to DM at 62    Other Mother      enlarged heart    Dementia Father     Other Father      Brain tumor     Diabetes Maternal Aunt     Diabetes Maternal Uncle     Glaucoma Neg Hx        Social History     Social History    Marital status: Legally      Spouse name: N/A    Number of children: N/A    Years of education: N/A     Social History Main Topics    Smoking status: Former Smoker     Quit date: 5/1/1993    Smokeless tobacco: Never Used      Comment: cwaltmire rrt 3/12/17    Alcohol use 0.0 oz/week      Comment: has about 1 or 2 a year. very rarely    Drug use: No    Sexual activity: No      Comment: not for two years     Other Topics Concern    None     Social History Narrative    None     Review of Systems   Constitutional: Positive for fatigue. Musculoskeletal: Positive for arthralgias, myalgias and neck pain. Psychiatric/Behavioral: Positive for sleep disturbance. Objective:   Physical Exam   Constitutional: She is oriented to person, place, and time. Vital signs are normal. She appears well-developed and well-nourished. She is cooperative. No distress. HENT:   Head: Normocephalic and atraumatic. Right Ear: External ear normal.   Left Ear: External ear normal.   Cardiovascular: Normal rate. Pulmonary/Chest: Effort normal. No accessory muscle usage. No apnea and no bradypnea. No respiratory distress. Abdominal: Normal appearance. Neurological: She is alert and oriented to person, place, and time. No cranial nerve deficit. Gait normal. GCS eye subscore is 4. GCS verbal subscore is 5. GCS motor subscore is 6. Skin: Skin is warm, dry and intact. She is not diaphoretic. No cyanosis. No pallor. Nails show no clubbing. Psychiatric: She has a normal mood and affect. Her speech is normal and behavior is normal. Judgment and thought content normal. Cognition and memory are normal.   Vitals reviewed. Assessment:      1. Encounter for chronic pain management    2. Fibromyalgia    3. Right hip pain    4. Cervicalgia    5. Encounter for long-term opiate analgesic use    6. Encounter for drug screening          Plan:     Continue current pain medications to improve quality of life and function. Controlled Substances Monitoring: Attestation: The Prescription Monitoring Report for this patient was reviewed today. Winston Mccullough NP)  Documentation: Random urine drug screen sent today., Obtaining appropriate analgesic effect of treatment., No signs of potential drug abuse or diversion identified., Existing medication contract.  Winston Mccullough NP)  Follow up 2 months

## 2017-11-28 ENCOUNTER — INITIAL CONSULT (OUTPATIENT)
Dept: SURGERY | Age: 57
End: 2017-11-28
Payer: MEDICARE

## 2017-11-28 VITALS
TEMPERATURE: 98.3 F | SYSTOLIC BLOOD PRESSURE: 138 MMHG | BODY MASS INDEX: 32.98 KG/M2 | RESPIRATION RATE: 12 BRPM | DIASTOLIC BLOOD PRESSURE: 76 MMHG | WEIGHT: 168 LBS | HEIGHT: 60 IN | HEART RATE: 72 BPM

## 2017-11-28 DIAGNOSIS — K82.4 GALL BLADDER POLYP: ICD-10-CM

## 2017-11-28 DIAGNOSIS — K58.0 IRRITABLE BOWEL SYNDROME WITH DIARRHEA: Primary | ICD-10-CM

## 2017-11-28 PROCEDURE — 3017F COLORECTAL CA SCREEN DOC REV: CPT | Performed by: SURGERY

## 2017-11-28 PROCEDURE — 3014F SCREEN MAMMO DOC REV: CPT | Performed by: SURGERY

## 2017-11-28 PROCEDURE — G8417 CALC BMI ABV UP PARAM F/U: HCPCS | Performed by: SURGERY

## 2017-11-28 PROCEDURE — 1036F TOBACCO NON-USER: CPT | Performed by: SURGERY

## 2017-11-28 PROCEDURE — G8427 DOCREV CUR MEDS BY ELIG CLIN: HCPCS | Performed by: SURGERY

## 2017-11-28 PROCEDURE — G8484 FLU IMMUNIZE NO ADMIN: HCPCS | Performed by: SURGERY

## 2017-11-28 PROCEDURE — 99213 OFFICE O/P EST LOW 20 MIN: CPT | Performed by: SURGERY

## 2017-11-28 NOTE — PROGRESS NOTES
Episodes of abdominal pain with nausea, vomiting and diarrhea. Lasts for several hours, but takes her several days to feel better. Her most recent episode might have been trigger by a piece of birthday cake that triggered , although she had eaten some previously without have a problem. She also get diaphoretic. She tries taking bentyl and sucralfate. Has history of recurrent small bowel obstructions and IBS. No weight loss. IMP/PLAN  1) IBS - D   - She has had relapsing symptoms for a long while. They are not progressive, but do make her feel quite poorly. - She has had relatively recent extensive work up. I don't think repeat an EGD, colonoscopy, CT will be helpful. I am going to get a repeat ultrasound, as previous ultrasound did show a possible polyp  - Try dietary modification. Low  FODMAP diet  - While she feels very poor from these episodes, this is not a degenerative disease. Symptoms may wax and wane over time but it is unlikely to ever become life-threatening.  - I believe she has some significant anxiety symptoms too. Might want to discuss this with Dr. Den Nevarez. She was given information about IBS  She was given information about the FODMAP diet. Recheck in about 1 month.     Today's visit was a counseling session lasting a little more than 15 minutes

## 2017-11-29 ENCOUNTER — HOSPITAL ENCOUNTER (OUTPATIENT)
Dept: INTERVENTIONAL RADIOLOGY/VASCULAR | Age: 57
Discharge: HOME | End: 2017-11-29
Payer: MEDICARE

## 2017-11-29 DIAGNOSIS — K82.4 GALL BLADDER POLYP: ICD-10-CM

## 2017-11-29 PROCEDURE — 76705 ECHO EXAM OF ABDOMEN: CPT

## 2017-12-04 ENCOUNTER — HOSPITAL ENCOUNTER (OUTPATIENT)
Dept: LAB | Age: 57
Setting detail: SPECIMEN
Discharge: HOME | End: 2017-12-04
Payer: MEDICARE

## 2017-12-04 ENCOUNTER — OFFICE VISIT (OUTPATIENT)
Dept: OTOLARYNGOLOGY | Age: 57
End: 2017-12-04
Payer: MEDICARE

## 2017-12-04 VITALS
RESPIRATION RATE: 12 BRPM | HEART RATE: 72 BPM | SYSTOLIC BLOOD PRESSURE: 118 MMHG | DIASTOLIC BLOOD PRESSURE: 72 MMHG | HEIGHT: 60 IN | WEIGHT: 166 LBS | BODY MASS INDEX: 32.59 KG/M2

## 2017-12-04 DIAGNOSIS — R04.0 EPISTAXIS: ICD-10-CM

## 2017-12-04 DIAGNOSIS — R04.0 EPISTAXIS: Primary | ICD-10-CM

## 2017-12-04 LAB
6-ACETYLMORPHINE, UR: NOT DETECTED
7-AMINOCLONAZEPAM, URINE: NOT DETECTED
ABSOLUTE EOS #: 0.3 K/UL (ref 0–0.4)
ABSOLUTE IMMATURE GRANULOCYTE: ABNORMAL K/UL (ref 0–0.3)
ABSOLUTE LYMPH #: 1.8 K/UL (ref 1–4.8)
ABSOLUTE MONO #: 0.7 K/UL (ref 0.1–1.2)
ALPHA-OH-ALPRAZ, URINE: NOT DETECTED
ALPRAZOLAM, URINE: NOT DETECTED
AMPHETAMINES, URINE: NOT DETECTED
BARBITURATES, URINE: NOT DETECTED
BASOPHILS # BLD: 0 % (ref 0–1)
BASOPHILS ABSOLUTE: 0 K/UL (ref 0–0.2)
BENZOYLECGONINE, UR: NOT DETECTED
BUN BLDV-MCNC: 12 MG/DL (ref 6–20)
BUPRENORPHINE URINE: NOT DETECTED
CARISOPRODOL, UR: NOT DETECTED
CLONAZEPAM, URINE: NOT DETECTED
CODEINE, URINE: NOT DETECTED
CREAT SERPL-MCNC: 0.5 MG/DL (ref 0.5–0.9)
CREATININE URINE: 123 MG/DL (ref 20–400)
DIAZEPAM, URINE: NOT DETECTED
DIFFERENTIAL TYPE: ABNORMAL
EER PAIN MGT DRUG PANEL, HIGH RES/EMIT U: NORMAL
EOSINOPHILS RELATIVE PERCENT: 4 % (ref 1–7)
ETHYL GLUCURONIDE UR: NOT DETECTED
FENTANYL URINE: NOT DETECTED
GFR AFRICAN AMERICAN: >60 ML/MIN
GFR NON-AFRICAN AMERICAN: >60 ML/MIN
GFR SERPL CREATININE-BSD FRML MDRD: NORMAL ML/MIN/{1.73_M2}
GFR SERPL CREATININE-BSD FRML MDRD: NORMAL ML/MIN/{1.73_M2}
HCT VFR BLD CALC: 35.4 % (ref 36–46)
HEMOGLOBIN: 11.5 G/DL (ref 12–16)
HYDROCODONE, URINE: NOT DETECTED
HYDROMORPHONE, URINE: NOT DETECTED
IMMATURE GRANULOCYTES: ABNORMAL %
LORAZEPAM, URINE: NOT DETECTED
LYMPHOCYTES # BLD: 25 % (ref 16–46)
MARIJUANA METAB, UR: NOT DETECTED
MCH RBC QN AUTO: 26.8 PG (ref 26–34)
MCHC RBC AUTO-ENTMCNC: 32.4 G/DL (ref 31–37)
MCV RBC AUTO: 82.8 FL (ref 80–100)
MDA, UR: NOT DETECTED
MDEA, EVE, UR: NOT DETECTED
MDMA URINE: NOT DETECTED
MEPERIDINE METAB, UR: NOT DETECTED
METHADONE, URINE: NOT DETECTED
METHAMPHETAMINE, URINE: NOT DETECTED
METHYLPHENIDATE: NOT DETECTED
MIDAZOLAM, URINE: NOT DETECTED
MONOCYTES # BLD: 10 % (ref 4–11)
MORPHINE URINE: NOT DETECTED
NORBUPRENORPHINE, URINE: NOT DETECTED
NORDIAZEPAM, URINE: NOT DETECTED
NORFENTANYL, URINE: NOT DETECTED
NORHYDROCODONE, URINE: NOT DETECTED
NOROXYCODONE, URINE: NOT DETECTED
NOROXYMORPHONE, URINE: NOT DETECTED
OXAZEPAM, URINE: NOT DETECTED
OXYCODONE URINE: NOT DETECTED
OXYMORPHONE, URINE: NOT DETECTED
PAIN MGT DRUG PANEL, HI RES, UR: NORMAL
PCP,URINE: NOT DETECTED
PDW BLD-RTO: 14.9 % (ref 11–14.5)
PHENTERMINE, UR: NOT DETECTED
PLATELET # BLD: 373 K/UL (ref 140–450)
PLATELET ESTIMATE: ABNORMAL
PMV BLD AUTO: 7.7 FL (ref 6–12)
PROPOXYPHENE, URINE: NOT DETECTED
RBC # BLD: 4.28 M/UL (ref 4–5.2)
RBC # BLD: ABNORMAL 10*6/UL
SEDIMENTATION RATE, ERYTHROCYTE: 38 MM (ref 0–30)
SEG NEUTROPHILS: 61 % (ref 43–77)
SEGMENTED NEUTROPHILS ABSOLUTE COUNT: 4.3 K/UL (ref 1.8–7.7)
TAPENTADOL, URINE: NOT DETECTED
TAPENTADOL-O-SULFATE, URINE: NOT DETECTED
TEMAZEPAM, URINE: NOT DETECTED
TRAMADOL, URINE: NOT DETECTED
WBC # BLD: 7.1 K/UL (ref 3.5–11)
WBC # BLD: ABNORMAL 10*3/UL
ZOLPIDEM, URINE: NOT DETECTED

## 2017-12-04 PROCEDURE — 36415 COLL VENOUS BLD VENIPUNCTURE: CPT

## 2017-12-04 PROCEDURE — 84520 ASSAY OF UREA NITROGEN: CPT

## 2017-12-04 PROCEDURE — 83516 IMMUNOASSAY NONANTIBODY: CPT

## 2017-12-04 PROCEDURE — 82565 ASSAY OF CREATININE: CPT

## 2017-12-04 PROCEDURE — 31231 NASAL ENDOSCOPY DX: CPT | Performed by: OTOLARYNGOLOGY

## 2017-12-04 PROCEDURE — 85025 COMPLETE CBC W/AUTO DIFF WBC: CPT

## 2017-12-04 PROCEDURE — 85651 RBC SED RATE NONAUTOMATED: CPT

## 2017-12-04 NOTE — PROGRESS NOTES
ROS:   CV:    Endocrine:    Resp:  asthma   GI:       Neuro: seizures  PE:     General appearance:  Normal                 Ability to Communicate:  Normal       Head & Face:  Normal   Salivary Glands:  Normal              Facial Strength:  Normal   Ears:    Pinna:  Normal            EAC:  Normal      TMs:  Normal       [place]:  [place]avarria Rinne     Finger Rub     Nose:    External: Normal    Septum:  Normal    Turbinates: Normal             Nasal Cavity: Normal         Naso Pharyngoscopy:     Nasal Endoscopy:    Procedure: Nasal Endoscopy    Pre-op dx: R epistaxis    Post-op: Same    Indications: same    Anesthesia:   Topical spray of oxymetazoline & 4% lidocaine mixed 1:1    Description of procedure: each nostril was sprayed with an oxymetazoline & 4% lidocaine mixture. After approximately 5 minutes a [employer] 4mm fiberoptic nasopharyngoscope was passed through each nostril. The septum, inferior, and middle turbinates, middle and inferior meati, and naso pharynx were all visualized.       Findings:     Septum: Normal    R inf turb: Normal    L inf turb: Normal    R mid turb: ulcerated ant end    L mid turb: Normal    R mid meatus: Normal    L mid meatus: Normal    R inf  meatus: Normal    L inf  meatus: Normal    Naso Pharynx: Normal      Oral Cavity & Oral Pharynx:    Tongue:  Normal    Teeth & Gums:             Palate:  Kia     Tonsils:      FOM:  Normal     Other:      Neck:    Thyroid:Normal       Lymph nodes: Normal           Trachea:  Normal      Masses:  Normal    Other:        Eyes:    EOMs:      Nystagmus:      Neurological:    CN V:      CN VII:       Gait & Station:      Romberg:      Tandem Gait:      Hal[place]:       Oriented x 3: Normal     Affect:  Normal    Data reviewed:      ASSESSMENT:  1. Epistaxis  Anti-Neutrophilic Cytoplasmic Antibody    BUN & Creatinine    CBC with Differential    Sedimentation Rate       PLAN:saline, PSO, wegeners profile, see 10 days  No Follow-up on file. No orders of the defined types were placed in this encounter.         Ayaz Ortiz MD

## 2017-12-07 ENCOUNTER — TELEPHONE (OUTPATIENT)
Dept: PAIN MANAGEMENT | Age: 57
End: 2017-12-07

## 2017-12-08 LAB
ANCA MYELOPEROXIDASE: 18 AU/ML
ANCA PROTEINASE 3: 13 AU/ML

## 2017-12-12 DIAGNOSIS — M79.7 FIBROMYALGIA: ICD-10-CM

## 2017-12-12 DIAGNOSIS — M54.2 CERVICALGIA: ICD-10-CM

## 2017-12-12 DIAGNOSIS — M25.551 RIGHT HIP PAIN: ICD-10-CM

## 2017-12-12 RX ORDER — HYDROCODONE BITARTRATE AND ACETAMINOPHEN 10; 325 MG/1; MG/1
1 TABLET ORAL 4 TIMES DAILY
Qty: 120 TABLET | Refills: 0 | Status: SHIPPED | OUTPATIENT
Start: 2017-12-13 | End: 2018-01-16 | Stop reason: SDUPTHER

## 2017-12-12 NOTE — TELEPHONE ENCOUNTER
Pt called refill line for [place] , to be sent to [facility] Last Appt:  11/27/2017  Next Appt:   1/22/2018  Med verified in [address] checked for [place], and [place]: 11/13/2017 [place] #120. Due 12/13/2017.

## 2017-12-15 ENCOUNTER — OFFICE VISIT (OUTPATIENT)
Dept: OTOLARYNGOLOGY | Age: 57
End: 2017-12-15
Payer: MEDICARE

## 2017-12-15 VITALS
DIASTOLIC BLOOD PRESSURE: 90 MMHG | RESPIRATION RATE: 16 BRPM | TEMPERATURE: 97.9 F | WEIGHT: 168.4 LBS | BODY MASS INDEX: 33.06 KG/M2 | SYSTOLIC BLOOD PRESSURE: 132 MMHG | HEIGHT: 60 IN

## 2017-12-15 DIAGNOSIS — J34.3 NASAL TURBINATE HYPERTROPHY: ICD-10-CM

## 2017-12-15 DIAGNOSIS — R04.0 EPISTAXIS: Primary | ICD-10-CM

## 2017-12-15 PROCEDURE — 31231 NASAL ENDOSCOPY DX: CPT | Performed by: OTOLARYNGOLOGY

## 2017-12-15 RX ORDER — CEPHALEXIN 250 MG/1
250 CAPSULE ORAL 4 TIMES DAILY
Qty: 40 CAPSULE | Refills: 0 | Status: SHIPPED | OUTPATIENT
Start: 2017-12-15 | End: 2017-12-25

## 2017-12-15 NOTE — PROGRESS NOTES
consider nasal irrigation  Return in about 4 weeks (around 1/12/2018).     Orders Placed This Encounter   Medications    cephALEXin (KEFLEX) 250 MG capsule     Sig: Take 1 capsule by mouth 4 times daily for 10 days     Dispense:  40 capsule     Refill:  0         Sharda Steen MD

## 2018-01-02 ENCOUNTER — OFFICE VISIT (OUTPATIENT)
Dept: SURGERY | Age: 58
End: 2018-01-02
Payer: MEDICARE

## 2018-01-02 VITALS
RESPIRATION RATE: 12 BRPM | SYSTOLIC BLOOD PRESSURE: 130 MMHG | WEIGHT: 160 LBS | HEIGHT: 62 IN | HEART RATE: 80 BPM | DIASTOLIC BLOOD PRESSURE: 84 MMHG | TEMPERATURE: 98.4 F | BODY MASS INDEX: 29.44 KG/M2

## 2018-01-02 DIAGNOSIS — K82.4 GALL BLADDER POLYP: ICD-10-CM

## 2018-01-02 DIAGNOSIS — K58.0 IRRITABLE BOWEL SYNDROME WITH DIARRHEA: Primary | ICD-10-CM

## 2018-01-02 PROCEDURE — 99212 OFFICE O/P EST SF 10 MIN: CPT | Performed by: SURGERY

## 2018-01-16 DIAGNOSIS — M54.2 CERVICALGIA: ICD-10-CM

## 2018-01-16 DIAGNOSIS — M79.7 FIBROMYALGIA: ICD-10-CM

## 2018-01-16 DIAGNOSIS — M25.551 RIGHT HIP PAIN: ICD-10-CM

## 2018-01-16 NOTE — TELEPHONE ENCOUNTER
OARRS Report checked for [place], and [place]: 12/13/17 norco 10-325mg #120. Due now.     Last Appt:  11/27/2017  Next Appt:   1/22/2018  Med verified in [address]

## 2018-01-17 RX ORDER — HYDROCODONE BITARTRATE AND ACETAMINOPHEN 10; 325 MG/1; MG/1
1 TABLET ORAL 4 TIMES DAILY
Qty: 120 TABLET | Refills: 0 | Status: SHIPPED | OUTPATIENT
Start: 2018-01-17 | End: 2018-02-14 | Stop reason: SDUPTHER

## 2018-01-22 ENCOUNTER — OFFICE VISIT (OUTPATIENT)
Dept: PAIN MANAGEMENT | Age: 58
End: 2018-01-22
Payer: MEDICARE

## 2018-01-22 VITALS
OXYGEN SATURATION: 95 % | RESPIRATION RATE: 16 BRPM | WEIGHT: 168 LBS | HEART RATE: 100 BPM | DIASTOLIC BLOOD PRESSURE: 88 MMHG | HEIGHT: 62 IN | SYSTOLIC BLOOD PRESSURE: 120 MMHG | BODY MASS INDEX: 30.91 KG/M2

## 2018-01-22 DIAGNOSIS — M25.512 CHRONIC LEFT SHOULDER PAIN: ICD-10-CM

## 2018-01-22 DIAGNOSIS — G89.29 ENCOUNTER FOR CHRONIC PAIN MANAGEMENT: ICD-10-CM

## 2018-01-22 DIAGNOSIS — G89.29 CHRONIC LEFT SHOULDER PAIN: ICD-10-CM

## 2018-01-22 DIAGNOSIS — M54.12 CERVICAL RADICULAR PAIN: Primary | ICD-10-CM

## 2018-01-22 DIAGNOSIS — L93.0 LUPUS ERYTHEMATOSUS, UNSPECIFIED FORM: ICD-10-CM

## 2018-01-22 PROCEDURE — 3017F COLORECTAL CA SCREEN DOC REV: CPT | Performed by: NURSE PRACTITIONER

## 2018-01-22 PROCEDURE — G8417 CALC BMI ABV UP PARAM F/U: HCPCS | Performed by: NURSE PRACTITIONER

## 2018-01-22 PROCEDURE — 99214 OFFICE O/P EST MOD 30 MIN: CPT | Performed by: NURSE PRACTITIONER

## 2018-01-22 PROCEDURE — G8427 DOCREV CUR MEDS BY ELIG CLIN: HCPCS | Performed by: NURSE PRACTITIONER

## 2018-01-22 PROCEDURE — 3014F SCREEN MAMMO DOC REV: CPT | Performed by: NURSE PRACTITIONER

## 2018-01-22 PROCEDURE — G8484 FLU IMMUNIZE NO ADMIN: HCPCS | Performed by: NURSE PRACTITIONER

## 2018-01-22 PROCEDURE — 1036F TOBACCO NON-USER: CPT | Performed by: NURSE PRACTITIONER

## 2018-01-22 NOTE — PATIENT INSTRUCTIONS
Pain Management Plan:  1. Continue current pain medications to improve quality of life and function. Pain clinic phone numbers  Refills  - Call [phone]. Please leave your name, a working phone number, date of birth, the name, dose, quantity, and last refill date of the prescription, and the pharmacy. Medication or Procedure Questions - Call [phone]. This is the direct line to the [facility] Pain Management Nurses. Appointment or General Questions - Call  [phone]. This is the direct line the  [address] [place] [address] can also use [employer]. Is your [employer] Activated? How to dispose of unused pain medications safely:  · Flush all unused pain medications. This is the FDA's recommended way of disposing these medications. · All other medications can be disposed in the trash mixed in undesirable contents (used coffee grounds, used [employer], etc).

## 2018-01-22 NOTE — PROGRESS NOTES
Subjective:      Patient ID: Donna Good is a 59 y.o. female. Chief Complaint   Patient presents with    Medication Check    Results     pt here to discuss UDS results     HPI Here today for routine pain clinic recheck. Opiates not present last UDS. States not present because she stopped taking while she was ill, on antibiotic. Overall medications working well, intermittent flare ups. Toradol IM works well. Pain Assessment  Location of Pain: Leg  Location Modifiers:  (bilateral)  Severity of Pain:  (3 currently but up to a 10 at times)  Quality of Pain:  (ripping; burning)  Duration of Pain: Persistent  Frequency of Pain: Several days a week  Aggravating Factors: Walking, Standing, Squatting, Kneeling, Exercise, Straightening, Stretching, Bending  Limiting Behavior: Yes  Relieving Factors: Heat  Result of Injury: No  Work-Related Injury: No  Are there other pain locations you wish to document?: No    Allergies   Allergen Reactions    Latex Swelling     The powder in the gloves    Dilaudid [Hydromorphone Hcl] Nausea And Vomiting    Hydromorphone     Medrol [Methylprednisolone] Other (See Comments)     Back pain    Prednisone Nausea And Vomiting       Outpatient Prescriptions Marked as Taking for the 1/22/18 encounter (Office Visit) with Keara Duran NP   Medication Sig Dispense Refill    Multiple Vitamins-Minerals (MULTI-VITAMIN/MINERALS PO) Take 1 tablet by mouth 2 times daily      HYDROcodone-acetaminophen (NORCO)  MG per tablet Take 1 tablet by mouth 4 times daily for 30 days. 120 tablet 0    Handicap Placard [facility] by Does not apply route Issue parking placard for persons with disabilities. [facility] sec 4503.44. Length of time expected to have disablilty:36 months. Applicant meets the qualifying disability criteria.  1 each 0    cyclobenzaprine (FLEXERIL) 10 MG tablet Take 1 tablet by mouth 3 times daily as needed for Muscle spasms 90 tablet 11    lisinopril (PRINIVIL;ZESTRIL) 10 MG tablet Take 1 tablet by mouth daily 30 tablet 5    ranitidine (ZANTAC) 150 MG tablet Take 1 tablet by mouth nightly 30 tablet 5    omeprazole (PRILOSEC) 20 MG delayed release capsule Take 1 capsule by mouth daily 30 capsule 5    Cholecalciferol (VITAMIN D3) 45005 units CAPS Take 1 capsule by mouth daily 30 capsule 5    docusate sodium (COLACE) 100 MG capsule Take 1 capsule by mouth 2 times daily 60 capsule 5    sucralfate (CARAFATE) 1 GM tablet Take 1 tablet by mouth 4 times daily as needed (abdominal pain) Take 1 hour prior to meals and bedtime. 60 tablet 0    diclofenac sodium 1 % GEL Apply 2 g topically 4 times daily 3 Tube 3    dicyclomine (BENTYL) 10 MG capsule Take 1 capsule by mouth 4 times daily as needed (Cramps/Abdominal Pain) 120 capsule 3    Misc.  Devices (CANE) MISC 1 Units by Does not apply route as needed 1 each 0       Past Medical History:   Diagnosis Date    Anxiety     Asthma     Cerebral artery occlusion with cerebral infarction [facility]) 2009    Cerebrovascular disease     stroke 1998    Depression     Diverticulitis     Fibromyalgia     History of small bowel obstruction     Hypertension     Lupus     Seizures ([facility] [address] 2005 and 2006       Past Surgical History:   Procedure Laterality Date    APPENDECTOMY       SECTION      x 4    COLONOSCOPY  2007    nl    COLONOSCOPY  2015    1 bx    DILATION AND CURETTAGE OF UTERUS      HYSTERECTOMY  2009    TAHBSO due to fibroids MMR    OTHER SURGICAL HISTORY Left 2016    C6 TFE    DC EGD TRANSORAL BIOPSY SINGLE/MULTIPLE N/A 2017    EGD BIOPSY performed by Cristofer Martino MD at [address] [facility]  3/1/16    16 removed on bottom 1 on top     TUBAL LIGATION         Family History   Problem Relation Age of Onset    Diabetes Mother      Passed due to DM at 62    Other Mother      enlarged heart    Dementia Father     Other Father      Brain tumor     Diabetes Maternal Aunt     Diabetes Maternal Uncle

## 2018-02-14 DIAGNOSIS — M54.2 CERVICALGIA: ICD-10-CM

## 2018-02-14 DIAGNOSIS — M79.7 FIBROMYALGIA: ICD-10-CM

## 2018-02-14 DIAGNOSIS — M25.551 RIGHT HIP PAIN: ICD-10-CM

## 2018-02-15 RX ORDER — HYDROCODONE BITARTRATE AND ACETAMINOPHEN 10; 325 MG/1; MG/1
1 TABLET ORAL 4 TIMES DAILY
Qty: 120 TABLET | Refills: 0 | Status: SHIPPED | OUTPATIENT
Start: 2018-02-16 | End: 2018-03-16 | Stop reason: SDUPTHER

## 2018-02-26 ENCOUNTER — APPOINTMENT (OUTPATIENT)
Dept: GENERAL RADIOLOGY | Age: 58
End: 2018-02-26
Payer: MEDICARE

## 2018-02-26 ENCOUNTER — HOSPITAL ENCOUNTER (EMERGENCY)
Age: 58
Discharge: HOME | End: 2018-02-26
Attending: EMERGENCY MEDICINE
Payer: MEDICARE

## 2018-02-26 ENCOUNTER — TELEPHONE (OUTPATIENT)
Dept: FAMILY MEDICINE CLINIC | Age: 58
End: 2018-02-26

## 2018-02-26 VITALS
TEMPERATURE: 97.2 F | RESPIRATION RATE: 14 BRPM | WEIGHT: 162 LBS | SYSTOLIC BLOOD PRESSURE: 150 MMHG | BODY MASS INDEX: 31.8 KG/M2 | HEART RATE: 104 BPM | DIASTOLIC BLOOD PRESSURE: 75 MMHG | OXYGEN SATURATION: 98 % | HEIGHT: 60 IN

## 2018-02-26 DIAGNOSIS — R07.9 CHEST PAIN, UNSPECIFIED TYPE: Primary | ICD-10-CM

## 2018-02-26 DIAGNOSIS — M32.9 EXACERBATION OF SYSTEMIC LUPUS (HCC): ICD-10-CM

## 2018-02-26 LAB
-: ABNORMAL
ABSOLUTE EOS #: 0.2 K/UL (ref 0–0.4)
ABSOLUTE IMMATURE GRANULOCYTE: ABNORMAL K/UL (ref 0–0.3)
ABSOLUTE LYMPH #: 1.6 K/UL (ref 1–4.8)
ABSOLUTE MONO #: 0.5 K/UL (ref 0.1–1.2)
AMORPHOUS: ABNORMAL
ANION GAP SERPL CALCULATED.3IONS-SCNC: 16 MMOL/L (ref 9–17)
BACTERIA: ABNORMAL
BASOPHILS # BLD: 1 % (ref 0–1)
BASOPHILS ABSOLUTE: 0 K/UL (ref 0–0.2)
BILIRUBIN URINE: NEGATIVE
BUN BLDV-MCNC: 12 MG/DL (ref 6–20)
BUN/CREAT BLD: 18 (ref 9–20)
CALCIUM SERPL-MCNC: 9.7 MG/DL (ref 8.6–10.4)
CASTS UA: ABNORMAL /LPF (ref 0–2)
CHLORIDE BLD-SCNC: 100 MMOL/L (ref 98–107)
CO2: 24 MMOL/L (ref 20–31)
COLOR: ABNORMAL
COMMENT UA: ABNORMAL
CREAT SERPL-MCNC: 0.66 MG/DL (ref 0.5–0.9)
CRYSTALS, UA: ABNORMAL /HPF
DIFFERENTIAL TYPE: ABNORMAL
EKG ATRIAL RATE: 103 BPM
EKG P AXIS: 54 DEGREES
EKG P-R INTERVAL: 146 MS
EKG Q-T INTERVAL: 354 MS
EKG QRS DURATION: 76 MS
EKG QTC CALCULATION (BAZETT): 463 MS
EKG R AXIS: 38 DEGREES
EKG T AXIS: 42 DEGREES
EKG VENTRICULAR RATE: 103 BPM
EOSINOPHILS RELATIVE PERCENT: 3 % (ref 1–7)
EPITHELIAL CELLS UA: ABNORMAL /HPF (ref 0–5)
GFR AFRICAN AMERICAN: >60 ML/MIN
GFR NON-AFRICAN AMERICAN: >60 ML/MIN
GFR SERPL CREATININE-BSD FRML MDRD: ABNORMAL ML/MIN/{1.73_M2}
GFR SERPL CREATININE-BSD FRML MDRD: ABNORMAL ML/MIN/{1.73_M2}
GLUCOSE BLD-MCNC: 121 MG/DL (ref 70–99)
GLUCOSE URINE: NEGATIVE
HCT VFR BLD CALC: 36.2 % (ref 36–46)
HEMOGLOBIN: 11.8 G/DL (ref 12–16)
IMMATURE GRANULOCYTES: ABNORMAL %
KETONES, URINE: NEGATIVE
LEUKOCYTE ESTERASE, URINE: NEGATIVE
LYMPHOCYTES # BLD: 17 % (ref 16–46)
MCH RBC QN AUTO: 27.1 PG (ref 26–34)
MCHC RBC AUTO-ENTMCNC: 32.6 G/DL (ref 31–37)
MCV RBC AUTO: 83.1 FL (ref 80–100)
MONOCYTES # BLD: 6 % (ref 4–11)
MUCUS: ABNORMAL
NITRITE, URINE: NEGATIVE
NRBC AUTOMATED: ABNORMAL PER 100 WBC
OTHER OBSERVATIONS UA: ABNORMAL
PDW BLD-RTO: 15.3 % (ref 11–14.5)
PH UA: 6 (ref 5–6)
PLATELET # BLD: 362 K/UL (ref 140–450)
PLATELET ESTIMATE: ABNORMAL
PMV BLD AUTO: 8.3 FL (ref 6–12)
POTASSIUM SERPL-SCNC: 4 MMOL/L (ref 3.7–5.3)
PROTEIN UA: NEGATIVE
RBC # BLD: 4.36 M/UL (ref 4–5.2)
RBC # BLD: ABNORMAL 10*6/UL
RBC UA: ABNORMAL /HPF (ref 0–4)
RENAL EPITHELIAL, UA: ABNORMAL /HPF
SEG NEUTROPHILS: 73 % (ref 43–77)
SEGMENTED NEUTROPHILS ABSOLUTE COUNT: 6.6 K/UL (ref 1.8–7.7)
SODIUM BLD-SCNC: 140 MMOL/L (ref 135–144)
SPECIFIC GRAVITY UA: 1.02 (ref 1.01–1.02)
TRICHOMONAS: ABNORMAL
TROPONIN INTERP: NORMAL
TROPONIN T: <0.03 NG/ML
TURBIDITY: ABNORMAL
URINE HGB: ABNORMAL
UROBILINOGEN, URINE: NORMAL
WBC # BLD: 9 K/UL (ref 3.5–11)
WBC # BLD: ABNORMAL 10*3/UL
WBC UA: ABNORMAL /HPF (ref 0–4)
YEAST: ABNORMAL

## 2018-02-26 PROCEDURE — 93005 ELECTROCARDIOGRAM TRACING: CPT

## 2018-02-26 PROCEDURE — 36415 COLL VENOUS BLD VENIPUNCTURE: CPT

## 2018-02-26 PROCEDURE — 71045 X-RAY EXAM CHEST 1 VIEW: CPT

## 2018-02-26 PROCEDURE — 85025 COMPLETE CBC W/AUTO DIFF WBC: CPT

## 2018-02-26 PROCEDURE — 96372 THER/PROPH/DIAG INJ SC/IM: CPT

## 2018-02-26 PROCEDURE — 99285 EMERGENCY DEPT VISIT HI MDM: CPT

## 2018-02-26 PROCEDURE — 6360000002 HC RX W HCPCS: Performed by: EMERGENCY MEDICINE

## 2018-02-26 PROCEDURE — 81001 URINALYSIS AUTO W/SCOPE: CPT

## 2018-02-26 PROCEDURE — 80048 BASIC METABOLIC PNL TOTAL CA: CPT

## 2018-02-26 PROCEDURE — 96374 THER/PROPH/DIAG INJ IV PUSH: CPT

## 2018-02-26 PROCEDURE — 84484 ASSAY OF TROPONIN QUANT: CPT

## 2018-02-26 RX ORDER — ORPHENADRINE CITRATE 30 MG/ML
60 INJECTION INTRAMUSCULAR; INTRAVENOUS ONCE
Status: COMPLETED | OUTPATIENT
Start: 2018-02-26 | End: 2018-02-26

## 2018-02-26 RX ORDER — CEPHALEXIN 500 MG/1
500 CAPSULE ORAL 2 TIMES DAILY
Qty: 20 CAPSULE | Refills: 0 | Status: SHIPPED | OUTPATIENT
Start: 2018-02-26 | End: 2018-03-22

## 2018-02-26 RX ORDER — KETOROLAC TROMETHAMINE 30 MG/ML
30 INJECTION, SOLUTION INTRAMUSCULAR; INTRAVENOUS ONCE
Status: COMPLETED | OUTPATIENT
Start: 2018-02-26 | End: 2018-02-26

## 2018-02-26 RX ADMIN — ORPHENADRINE CITRATE 60 MG: 30 INJECTION INTRAMUSCULAR; INTRAVENOUS at 14:01

## 2018-02-26 RX ADMIN — KETOROLAC TROMETHAMINE 30 MG: 30 INJECTION, SOLUTION INTRAMUSCULAR at 13:59

## 2018-02-26 ASSESSMENT — PAIN SCALES - WONG BAKER: WONGBAKER_NUMERICALRESPONSE: 10

## 2018-02-26 ASSESSMENT — PAIN SCALES - GENERAL
PAINLEVEL_OUTOF10: 10
PAINLEVEL_OUTOF10: 10

## 2018-02-26 ASSESSMENT — PAIN DESCRIPTION - ORIENTATION: ORIENTATION: LEFT

## 2018-02-26 ASSESSMENT — PAIN DESCRIPTION - FREQUENCY: FREQUENCY: CONTINUOUS

## 2018-02-26 ASSESSMENT — PAIN DESCRIPTION - DESCRIPTORS: DESCRIPTORS: SHARP;SPASM

## 2018-02-26 ASSESSMENT — PAIN DESCRIPTION - ONSET: ONSET: SUDDEN

## 2018-02-26 ASSESSMENT — PAIN DESCRIPTION - PAIN TYPE: TYPE: ACUTE PAIN

## 2018-02-26 ASSESSMENT — PAIN DESCRIPTION - LOCATION: LOCATION: CHEST

## 2018-02-26 ASSESSMENT — PAIN DESCRIPTION - DIRECTION: RADIATING_TOWARDS: RADIATES TO BACK

## 2018-02-26 NOTE — ED TRIAGE NOTES
Patient with chest pain. Patient states radiates to back. Patient reports was sweaty and SOB. Patient reports pain increases with palpation. patient also with abdomen discomfort. Worried she has a UTI vs kidney stone.

## 2018-02-26 NOTE — ED PROVIDER NOTES
ALLERGIES     is allergic to latex; dilaudid [hydromorphone hcl]; hydromorphone; medrol [methylprednisolone]; and prednisone. FAMILY HISTORY     indicated that her mother is . She indicated that her father is . She indicated that her maternal aunt is . She indicated that her maternal uncle is . She indicated that the status of her neg hx is unknown.      family history includes Dementia in her father; Diabetes in her maternal aunt, maternal uncle, and mother; Other in her father and mother. SOCIAL HISTORY      reports that she quit smoking about 24 years ago. She has never used smokeless tobacco. She reports that she drinks alcohol. She reports that she does not use drugs. PHYSICAL EXAM     INITIAL VITALS:  height is 5' (1.524 m) and weight is 162 lb (73.5 kg). Her temperature is 97.2 °F (36.2 °C). Her blood pressure is 150/75 (abnormal) and her pulse is 104. Her respiration is 14 and oxygen saturation is 98%. Constitutional: The patient is alert and well-developed, vital signs as noted. Psychiatric: Oriented to time, place and person, affect is appropriate for age. Eyes: Pupils equal and reactive to light. EOMI. Ears, nose, and throat: Oropharynx clear  Neck: No masses, trachea midline, and no thyromegaly. Respiratory: Clear to auscultation, full aeration throughout all fields. Cardiovascular: No murmurs, heart sounds normal, no thrills. Gastrointestinal: No masses, no hepatosplenomegaly, bowel sounds positive. No tenderness. Skin: No rashes or lesions on exposed surfaces, good skin turgor. Extremities: Good range of motion, no edema. Chest: Without deformities, chest wall symmetrical, there is reproducible tenderness with palpation.     DIFFERENTIAL DIAGNOSIS/ MEDICAL DECISION MAKING:     HEART Risk Score:    Criteria Score Patient Score   History Highly Suspicious 2 0    Moderately Suspicious 1     Slightly Suspicious 0    EKG Significant ST Depression 2 0    Nonspecific 1     Normal 0    Age ? 70 y.o. 2 59 y.o.  1    > 37 y.o. & < 70 y.o. 1     ? 37 y.o. 0    Risk Factors ? 3 2 1    1-2 1     0 0    Troponin ? 3 times normal limit 2 0    >1 & < 3 times normal limit 1     Normal 0    Score 0-3 Low Risk 2    4-6 Moderate Risk     ? 7 High Risk    * Risk Factors include: Diabetes, Tobacco use, Hypertension, Hyperlipidemia, Family History of CAD and Obesity    Given Norflex with mild relief. She states that she gets nauseated from prednisone and gabapentin and Lyrica so these cannot be prescribed. She is to follow-up with her primary care physician as directed and use ice and rest as directed. Follow Exit Care instructions closely. I have reviewed the disposition diagnosis with the patient and or their family/guardian. I have answered their questions and given discharge instructions. They voiced understanding of these instructions and did not have any further questions or complaints. DIAGNOSTIC RESULTS     RADIOLOGY:   Non-plain film images such as CT, Ultrasound and MRI are read by the radiologist. Plain radiographic images are visualized and preliminarily interpreted by the emergency physician with the below findings:  XR CHEST PORTABLE   Final Result   No acute airspace disease identified.              LABS:  Results for orders placed or performed during the hospital encounter of 85/61/18   Basic Metabolic Panel   Result Value Ref Range    Glucose 121 (H) 70 - 99 mg/dL    BUN 12 6 - 20 mg/dL    CREATININE 0.66 0.50 - 0.90 mg/dL    Bun/Cre Ratio 18 9 - 20    Calcium 9.7 8.6 - 10.4 mg/dL    Sodium 140 135 - 144 mmol/L    Potassium 4.0 3.7 - 5.3 mmol/L    Chloride 100 98 - 107 mmol/L    CO2 24 20 - 31 mmol/L    Anion Gap 16 9 - 17 mmol/L    GFR Non-African American >60 >60 mL/min    GFR African American >60 >60 mL/min    GFR Comment          GFR Staging NOT REPORTED    CBC Auto Differential   Result Value Ref Range    WBC 9.0 3.5 - 11.0 k/uL Ventricular Rate 103 BPM    Atrial Rate 103 BPM    P-R Interval 146 ms    QRS Duration 76 ms    Q-T Interval 354 ms    QTc Calculation (Bazett) 463 ms    P Axis 54 degrees    R Axis 38 degrees    T Axis 42 degrees       ABNORMAL LABS:  Labs Reviewed   BASIC METABOLIC PANEL - Abnormal; Notable for the following:        Result Value    Glucose 121 (*)     All other components within normal limits   CBC WITH AUTO DIFFERENTIAL - Abnormal; Notable for the following:     Hemoglobin 11.8 (*)     RDW 15.3 (*)     All other components within normal limits   UA W/REFLEX CULTURE - Abnormal; Notable for the following:     Urine Hgb TRACE (*)     All other components within normal limits   MICROSCOPIC URINALYSIS - Abnormal; Notable for the following:     Bacteria, UA 1+ (*)     All other components within normal limits   TROPONIN        EKG: All EKG's are interpreted by the Emergency Department Physician who either signs or Co-signs this chart in the absence of a cardiologist.  EKG shows a sinus tachycardia of 103. Normal intervals and axis. No evidence of acute MI or ischemia. I interpreted. EMERGENCY DEPARTMENT COURSE:   Vitals:    Vitals:    02/26/18 1333 02/26/18 1336 02/26/18 1340 02/26/18 1445   BP: (!) 158/86 (!) 158/86 (!) 156/79 (!) 150/75   Pulse: 112 112 94 104   Resp: 22 19 19 14   Temp: 97.2 °F (36.2 °C)      SpO2: 99% 99% 97% 98%   Weight: 162 lb (73.5 kg)      Height: 5' (1.524 m)        -------------------------  BP: (!) 150/75, Temp: 97.2 °F (36.2 °C), Pulse: 104, Resp: 14    See DDX/MDM (Differential Diagnosis/Medical Decision Making) above      FINAL IMPRESSION      1. Chest pain, unspecified type    2.  Exacerbation of systemic lupus [facility])          DISPOSITION/PLAN   DISPOSITION Decision To Discharge 02/26/2018 03:17:14 PM      Condition on Disposition    Fair    PATIENT REFERRED TO:  Opal Beckford DO  [address] [place] [address] Rd  [phone]    In 1 day        DISCHARGE MEDICATIONS:  New

## 2018-02-27 ENCOUNTER — CARE COORDINATION (OUTPATIENT)
Dept: CARE COORDINATION | Age: 58
End: 2018-02-27

## 2018-02-27 NOTE — CARE COORDINATION
especially Monday- Friday during office hours for non-emergency problems. Writer advised the patient to speak to a nurse or MOA to assist them with the issue and maybe get them in as a same day/sick call before they decide to go to the ER. Writer also stressed that they can call me for any help regarding appointments,medications and questions/concerns regarding ER visits, patient understood well.

## 2018-02-28 ENCOUNTER — TELEPHONE (OUTPATIENT)
Dept: FAMILY MEDICINE CLINIC | Age: 58
End: 2018-02-28

## 2018-03-01 NOTE — TELEPHONE ENCOUNTER
Called the patient and spoke with her. Informed of the information below. She stopped me mid way through and stated \" why do you people keep saying I have a UTI, I don't have a UTI\", \" Do you understand what I'm saying? \"  Explained to her that because of the testing completed and the antibiotic being prescribed she was treated for a UTI. She then stated she would just pray.

## 2018-03-09 ENCOUNTER — TELEPHONE (OUTPATIENT)
Dept: FAMILY MEDICINE CLINIC | Age: 58
End: 2018-03-09

## 2018-03-16 DIAGNOSIS — M54.2 CERVICALGIA: ICD-10-CM

## 2018-03-16 DIAGNOSIS — M25.551 RIGHT HIP PAIN: ICD-10-CM

## 2018-03-16 DIAGNOSIS — M79.7 FIBROMYALGIA: ICD-10-CM

## 2018-03-16 RX ORDER — HYDROCODONE BITARTRATE AND ACETAMINOPHEN 10; 325 MG/1; MG/1
1 TABLET ORAL 4 TIMES DAILY
Qty: 120 TABLET | Refills: 0 | Status: SHIPPED | OUTPATIENT
Start: 2018-03-18 | End: 2018-04-16 | Stop reason: SDUPTHER

## 2018-03-16 NOTE — TELEPHONE ENCOUNTER
Pt called refill line for Norco.  Last Appt:  1/22/2018  Next Appt:   3/22/2018  Med verified in [address] checked for [place], [place], and [place]: 02/16/2018 Norco #[id number]. Due 3/18/2018.

## 2018-03-22 ENCOUNTER — HOSPITAL ENCOUNTER (OUTPATIENT)
Age: 58
Setting detail: SPECIMEN
Discharge: HOME | End: 2018-03-22
Payer: MEDICARE

## 2018-03-22 ENCOUNTER — OFFICE VISIT (OUTPATIENT)
Dept: PAIN MANAGEMENT | Age: 58
End: 2018-03-22
Payer: MEDICARE

## 2018-03-22 ENCOUNTER — TELEPHONE (OUTPATIENT)
Dept: PAIN MANAGEMENT | Age: 58
End: 2018-03-22

## 2018-03-22 VITALS
HEART RATE: 100 BPM | WEIGHT: 175 LBS | DIASTOLIC BLOOD PRESSURE: 90 MMHG | HEIGHT: 60 IN | SYSTOLIC BLOOD PRESSURE: 140 MMHG | BODY MASS INDEX: 34.36 KG/M2

## 2018-03-22 DIAGNOSIS — M47.812 FACET ARTHRITIS OF CERVICAL REGION: ICD-10-CM

## 2018-03-22 DIAGNOSIS — Z02.83 ENCOUNTER FOR DRUG SCREENING: ICD-10-CM

## 2018-03-22 DIAGNOSIS — M79.7 FIBROMYALGIA: Primary | ICD-10-CM

## 2018-03-22 DIAGNOSIS — M54.12 CERVICAL RADICULAR PAIN: ICD-10-CM

## 2018-03-22 DIAGNOSIS — Z79.891 ENCOUNTER FOR LONG-TERM OPIATE ANALGESIC USE: ICD-10-CM

## 2018-03-22 DIAGNOSIS — M25.512 CHRONIC LEFT SHOULDER PAIN: ICD-10-CM

## 2018-03-22 DIAGNOSIS — G89.29 CHRONIC LEFT SHOULDER PAIN: ICD-10-CM

## 2018-03-22 DIAGNOSIS — M79.7 FIBROMYALGIA: ICD-10-CM

## 2018-03-22 DIAGNOSIS — G89.29 ENCOUNTER FOR CHRONIC PAIN MANAGEMENT: ICD-10-CM

## 2018-03-22 PROCEDURE — 3017F COLORECTAL CA SCREEN DOC REV: CPT | Performed by: NURSE PRACTITIONER

## 2018-03-22 PROCEDURE — G8417 CALC BMI ABV UP PARAM F/U: HCPCS | Performed by: NURSE PRACTITIONER

## 2018-03-22 PROCEDURE — 1036F TOBACCO NON-USER: CPT | Performed by: NURSE PRACTITIONER

## 2018-03-22 PROCEDURE — 80307 DRUG TEST PRSMV CHEM ANLYZR: CPT

## 2018-03-22 PROCEDURE — 99214 OFFICE O/P EST MOD 30 MIN: CPT | Performed by: NURSE PRACTITIONER

## 2018-03-22 PROCEDURE — G8427 DOCREV CUR MEDS BY ELIG CLIN: HCPCS | Performed by: NURSE PRACTITIONER

## 2018-03-22 PROCEDURE — 3014F SCREEN MAMMO DOC REV: CPT | Performed by: NURSE PRACTITIONER

## 2018-03-22 PROCEDURE — G8484 FLU IMMUNIZE NO ADMIN: HCPCS | Performed by: NURSE PRACTITIONER

## 2018-03-22 NOTE — TELEPHONE ENCOUNTER
Silvia Camarillo came to [facility] window to check on  animal form. Wants to get a puppy. Informed Dr Paris Houston did receive the form but was out of the office this week. Pt stated that she will ask her Pain Mgt Dr if he will sign the form.

## 2018-03-22 NOTE — PROGRESS NOTES
cyclobenzaprine (FLEXERIL) 10 MG tablet Take 1 tablet by mouth 3 times daily as needed for Muscle spasms 90 tablet 11    lisinopril (PRINIVIL;ZESTRIL) 10 MG tablet Take 1 tablet by mouth daily 30 tablet 5    ranitidine (ZANTAC) 150 MG tablet Take 1 tablet by mouth nightly 30 tablet 5    omeprazole (PRILOSEC) 20 MG delayed release capsule Take 1 capsule by mouth daily 30 capsule 5    Cholecalciferol (VITAMIN D3) 89636 units CAPS Take 1 capsule by mouth daily 30 capsule 5    docusate sodium (COLACE) 100 MG capsule Take 1 capsule by mouth 2 times daily 60 capsule 5    sucralfate (CARAFATE) 1 GM tablet Take 1 tablet by mouth 4 times daily as needed (abdominal pain) Take 1 hour prior to meals and bedtime. 60 tablet 0    diclofenac sodium 1 % GEL Apply 2 g topically 4 times daily 3 Tube 3    dicyclomine (BENTYL) 10 MG capsule Take 1 capsule by mouth 4 times daily as needed (Cramps/Abdominal Pain) 120 capsule 3    Misc.  Devices (CANE) MISC 1 Units by Does not apply route as needed 1 each 0       Past Medical History:   Diagnosis Date    Anxiety     Asthma     Cerebral artery occlusion with cerebral infarction [facility]) 2009    Cerebrovascular disease     stroke 1998    Depression     Diverticulitis     Fibromyalgia     History of small bowel obstruction     Hypertension     Lupus     Seizures ([facility] [address] 2005 and        Past Surgical History:   Procedure Laterality Date    APPENDECTOMY       SECTION      x 4    COLONOSCOPY  2007    nl    COLONOSCOPY  2015    1 bx    DILATION AND CURETTAGE OF UTERUS      HYSTERECTOMY  2009    TAHBSO due to fibroids MMR    OTHER SURGICAL HISTORY Left 2016    C6 TFE    [place] EGD TRANSORAL BIOPSY SINGLE/MULTIPLE N/A 2017    EGD BIOPSY performed by Jelani Park MD at [address] [facility]  3/1/16    16 removed on bottom 1 on top     TUBAL LIGATION         Family History   Problem Relation Age of Onset    Diabetes Mother      Passed due to DM at 62    Other Mother      enlarged heart    Dementia Father     Other Father      Brain tumor     Diabetes Maternal Aunt     Diabetes Maternal Uncle     Glaucoma Neg Hx        Social History     Social History    Marital status: Legally      Spouse name: N/A    Number of children: N/A    Years of education: N/A     Social History Main Topics    Smoking status: Former Smoker     Quit date: 5/1/1993    Smokeless tobacco: Never Used      Comment: alecia rrt 3/12/17    Alcohol use 0.0 oz/week      Comment: has about 1 or 2 a year. very rarely    Drug use: No    Sexual activity: No      Comment: not for two years     Other Topics Concern    None     Social History Narrative    None     Review of Systems   Constitutional: Positive for fatigue. Musculoskeletal: Positive for arthralgias, myalgias and neck pain. Psychiatric/Behavioral: Positive for sleep disturbance. Objective:   Physical Exam   Constitutional: She is oriented to person, place, and time. Vital signs are normal. She appears well-developed and well-nourished. She is active and cooperative. Non-toxic appearance. She does not have a sickly appearance. She does not appear ill. No distress. HENT:   Head: Normocephalic and atraumatic. Right Ear: External ear normal.   Left Ear: External ear normal.   Cardiovascular: Normal rate. Pulmonary/Chest: Effort normal. No accessory muscle usage. No apnea and no bradypnea. No respiratory distress. No audible wheezing   Abdominal: Normal appearance. Neurological: She is alert and oriented to person, place, and time. She is not disoriented. No cranial nerve deficit. She displays no seizure activity. Gait normal. GCS eye subscore is 4. GCS verbal subscore is 5. GCS motor subscore is 6. Skin: Skin is warm, dry and intact. She is not diaphoretic. No cyanosis. No pallor. Nails show no clubbing.    Psychiatric: Her behavior is normal. Judgment and thought content normal. She is not

## 2018-03-23 ENCOUNTER — TELEPHONE (OUTPATIENT)
Dept: PAIN MANAGEMENT | Age: 58
End: 2018-03-23

## 2018-03-23 NOTE — TELEPHONE ENCOUNTER
Patient called and wanted to know if her papers had been filled out yet; please call call her when done.   [phone]

## 2018-03-24 LAB
6-ACETYLMORPHINE, UR: NOT DETECTED
7-AMINOCLONAZEPAM, URINE: NOT DETECTED
ALPHA-OH-ALPRAZ, URINE: NOT DETECTED
ALPRAZOLAM, URINE: NOT DETECTED
AMPHETAMINES, URINE: NOT DETECTED
BARBITURATES, URINE: NOT DETECTED
BENZOYLECGONINE, UR: NOT DETECTED
BUPRENORPHINE URINE: NOT DETECTED
CARISOPRODOL, UR: NOT DETECTED
CLONAZEPAM, URINE: NOT DETECTED
CODEINE, URINE: NOT DETECTED
CREATININE URINE: 56 MG/DL (ref 20–400)
DIAZEPAM, URINE: NOT DETECTED
EER PAIN MGT DRUG PANEL, HIGH RES/EMIT U: NORMAL
ETHYL GLUCURONIDE UR: NOT DETECTED
FENTANYL URINE: NOT DETECTED
HYDROCODONE, URINE: PRESENT
HYDROMORPHONE, URINE: NOT DETECTED
LORAZEPAM, URINE: NOT DETECTED
MARIJUANA METAB, UR: NOT DETECTED
MDA, UR: NOT DETECTED
MDEA, EVE, UR: NOT DETECTED
MDMA URINE: NOT DETECTED
MEPERIDINE METAB, UR: NOT DETECTED
METHADONE, URINE: NOT DETECTED
METHAMPHETAMINE, URINE: NOT DETECTED
METHYLPHENIDATE: NOT DETECTED
MIDAZOLAM, URINE: NOT DETECTED
MORPHINE URINE: NOT DETECTED
NORBUPRENORPHINE, URINE: NOT DETECTED
NORDIAZEPAM, URINE: NOT DETECTED
NORFENTANYL, URINE: NOT DETECTED
NORHYDROCODONE, URINE: PRESENT
NOROXYCODONE, URINE: NOT DETECTED
NOROXYMORPHONE, URINE: NOT DETECTED
OXAZEPAM, URINE: NOT DETECTED
OXYCODONE URINE: NOT DETECTED
OXYMORPHONE, URINE: NOT DETECTED
PAIN MGT DRUG PANEL, HI RES, UR: NORMAL
PCP,URINE: NOT DETECTED
PHENTERMINE, UR: NOT DETECTED
PROPOXYPHENE, URINE: NOT DETECTED
TAPENTADOL, URINE: NOT DETECTED
TAPENTADOL-O-SULFATE, URINE: NOT DETECTED
TEMAZEPAM, URINE: NOT DETECTED
TRAMADOL, URINE: NOT DETECTED
ZOLPIDEM, URINE: NOT DETECTED

## 2018-03-24 NOTE — TELEPHONE ENCOUNTER
Paperwork was completed on 3/21 and returned to [place], [place] to fax back to pt's apartment complex. Pt is welcome to ask her Pain Mgmt physician to sign, but it has already been completed by myself, so may need new form. I will not be approving the medical need for a  animal/pet.

## 2018-04-05 NOTE — TELEPHONE ENCOUNTER
Pt called back to check on the status of the pet form. Writer informed pt that Becky Velazquze was out all last week, and she catching up on things at the office. Will call the pt once its completed. Pt was upset about the response.

## 2018-04-16 DIAGNOSIS — M79.7 FIBROMYALGIA: ICD-10-CM

## 2018-04-16 DIAGNOSIS — M25.551 RIGHT HIP PAIN: ICD-10-CM

## 2018-04-16 DIAGNOSIS — M54.2 CERVICALGIA: ICD-10-CM

## 2018-04-16 RX ORDER — CYCLOBENZAPRINE HCL 10 MG
10 TABLET ORAL 3 TIMES DAILY PRN
Qty: 90 TABLET | Refills: 11 | Status: SHIPPED | OUTPATIENT
Start: 2018-04-16 | End: 2021-10-13 | Stop reason: SDUPTHER

## 2018-04-16 RX ORDER — HYDROCODONE BITARTRATE AND ACETAMINOPHEN 10; 325 MG/1; MG/1
1 TABLET ORAL 4 TIMES DAILY
Qty: 120 TABLET | Refills: 0 | Status: SHIPPED | OUTPATIENT
Start: 2018-04-17 | End: 2018-05-11 | Stop reason: SDUPTHER

## 2018-04-27 ENCOUNTER — OFFICE VISIT (OUTPATIENT)
Dept: PAIN MANAGEMENT | Age: 58
End: 2018-04-27
Payer: MEDICARE

## 2018-04-27 VITALS
SYSTOLIC BLOOD PRESSURE: 124 MMHG | DIASTOLIC BLOOD PRESSURE: 80 MMHG | BODY MASS INDEX: 34.36 KG/M2 | OXYGEN SATURATION: 98 % | HEART RATE: 87 BPM | WEIGHT: 175 LBS | RESPIRATION RATE: 16 BRPM | HEIGHT: 60 IN

## 2018-04-27 DIAGNOSIS — G89.29 ENCOUNTER FOR CHRONIC PAIN MANAGEMENT: ICD-10-CM

## 2018-04-27 DIAGNOSIS — M79.18 MYOFASCIAL PAIN: ICD-10-CM

## 2018-04-27 DIAGNOSIS — M54.2 CERVICALGIA: ICD-10-CM

## 2018-04-27 DIAGNOSIS — M79.7 FIBROMYALGIA: Primary | ICD-10-CM

## 2018-04-27 PROCEDURE — G8417 CALC BMI ABV UP PARAM F/U: HCPCS | Performed by: NURSE PRACTITIONER

## 2018-04-27 PROCEDURE — 1036F TOBACCO NON-USER: CPT | Performed by: NURSE PRACTITIONER

## 2018-04-27 PROCEDURE — 3017F COLORECTAL CA SCREEN DOC REV: CPT | Performed by: NURSE PRACTITIONER

## 2018-04-27 PROCEDURE — G8427 DOCREV CUR MEDS BY ELIG CLIN: HCPCS | Performed by: NURSE PRACTITIONER

## 2018-04-27 PROCEDURE — G0444 DEPRESSION SCREEN ANNUAL: HCPCS | Performed by: NURSE PRACTITIONER

## 2018-04-27 PROCEDURE — 99213 OFFICE O/P EST LOW 20 MIN: CPT | Performed by: NURSE PRACTITIONER

## 2018-04-27 ASSESSMENT — PATIENT HEALTH QUESTIONNAIRE - PHQ9
8. MOVING OR SPEAKING SO SLOWLY THAT OTHER PEOPLE COULD HAVE NOTICED. OR THE OPPOSITE, BEING SO FIGETY OR RESTLESS THAT YOU HAVE BEEN MOVING AROUND A LOT MORE THAN USUAL: 0
3. TROUBLE FALLING OR STAYING ASLEEP: 1
SUM OF ALL RESPONSES TO PHQ QUESTIONS 1-9: 9
SUM OF ALL RESPONSES TO PHQ9 QUESTIONS 1 & 2: 4
1. LITTLE INTEREST OR PLEASURE IN DOING THINGS: 3
6. FEELING BAD ABOUT YOURSELF - OR THAT YOU ARE A FAILURE OR HAVE LET YOURSELF OR YOUR FAMILY DOWN: 1
7. TROUBLE CONCENTRATING ON THINGS, SUCH AS READING THE NEWSPAPER OR WATCHING TELEVISION: 0
9. THOUGHTS THAT YOU WOULD BE BETTER OFF DEAD, OR OF HURTING YOURSELF: 0
2. FEELING DOWN, DEPRESSED OR HOPELESS: 1
4. FEELING TIRED OR HAVING LITTLE ENERGY: 1
10. IF YOU CHECKED OFF ANY PROBLEMS, HOW DIFFICULT HAVE THESE PROBLEMS MADE IT FOR YOU TO DO YOUR WORK, TAKE CARE OF THINGS AT HOME, OR GET ALONG WITH OTHER PEOPLE: 0
5. POOR APPETITE OR OVEREATING: 2

## 2018-05-11 DIAGNOSIS — M25.551 RIGHT HIP PAIN: ICD-10-CM

## 2018-05-11 DIAGNOSIS — M79.7 FIBROMYALGIA: ICD-10-CM

## 2018-05-11 DIAGNOSIS — M54.2 CERVICALGIA: ICD-10-CM

## 2018-05-11 RX ORDER — HYDROCODONE BITARTRATE AND ACETAMINOPHEN 10; 325 MG/1; MG/1
1 TABLET ORAL 4 TIMES DAILY
Qty: 120 TABLET | Refills: 0 | Status: SHIPPED | OUTPATIENT
Start: 2018-05-14 | End: 2018-06-20 | Stop reason: SDUPTHER

## 2018-05-26 DIAGNOSIS — I10 ESSENTIAL HYPERTENSION, BENIGN: ICD-10-CM

## 2018-05-29 RX ORDER — LISINOPRIL 10 MG/1
10 TABLET ORAL DAILY
Qty: 30 TABLET | Refills: 11 | Status: SHIPPED | OUTPATIENT
Start: 2018-05-29 | End: 2021-06-25 | Stop reason: SDUPTHER

## 2018-05-31 ENCOUNTER — HOSPITAL ENCOUNTER (EMERGENCY)
Age: 58
Discharge: HOME | End: 2018-05-31
Attending: EMERGENCY MEDICINE
Payer: MEDICARE

## 2018-05-31 ENCOUNTER — OFFICE VISIT (OUTPATIENT)
Dept: CARDIOLOGY | Age: 58
End: 2018-05-31
Payer: MEDICARE

## 2018-05-31 ENCOUNTER — APPOINTMENT (OUTPATIENT)
Dept: GENERAL RADIOLOGY | Age: 58
End: 2018-05-31
Payer: MEDICARE

## 2018-05-31 VITALS
BODY MASS INDEX: 33.38 KG/M2 | HEART RATE: 84 BPM | DIASTOLIC BLOOD PRESSURE: 88 MMHG | HEIGHT: 60 IN | SYSTOLIC BLOOD PRESSURE: 126 MMHG | WEIGHT: 170 LBS

## 2018-05-31 VITALS
SYSTOLIC BLOOD PRESSURE: 145 MMHG | WEIGHT: 170 LBS | BODY MASS INDEX: 33.38 KG/M2 | DIASTOLIC BLOOD PRESSURE: 70 MMHG | OXYGEN SATURATION: 96 % | TEMPERATURE: 97.7 F | RESPIRATION RATE: 16 BRPM | HEIGHT: 60 IN | HEART RATE: 85 BPM

## 2018-05-31 DIAGNOSIS — R07.9 CHEST PAIN, UNSPECIFIED TYPE: Primary | ICD-10-CM

## 2018-05-31 DIAGNOSIS — R07.89 ATYPICAL CHEST PAIN: Primary | ICD-10-CM

## 2018-05-31 LAB
ABSOLUTE EOS #: 0.25 K/UL (ref 0–0.4)
ABSOLUTE IMMATURE GRANULOCYTE: ABNORMAL K/UL (ref 0–0.3)
ABSOLUTE LYMPH #: 1.61 K/UL (ref 1–4.8)
ABSOLUTE MONO #: 0.36 K/UL (ref 0.1–1.2)
ANION GAP SERPL CALCULATED.3IONS-SCNC: 14 MMOL/L (ref 9–17)
BASOPHILS # BLD: 0 % (ref 0–1)
BASOPHILS ABSOLUTE: 0.02 K/UL (ref 0–0.2)
BUN BLDV-MCNC: 11 MG/DL (ref 6–20)
BUN/CREAT BLD: 20 (ref 9–20)
CALCIUM SERPL-MCNC: 9.4 MG/DL (ref 8.6–10.4)
CHLORIDE BLD-SCNC: 102 MMOL/L (ref 98–107)
CO2: 24 MMOL/L (ref 20–31)
CREAT SERPL-MCNC: 0.55 MG/DL (ref 0.5–0.9)
DIFFERENTIAL TYPE: ABNORMAL
EKG ATRIAL RATE: 84 BPM
EKG P AXIS: 41 DEGREES
EKG P-R INTERVAL: 150 MS
EKG Q-T INTERVAL: 418 MS
EKG QRS DURATION: 80 MS
EKG QTC CALCULATION (BAZETT): 493 MS
EKG R AXIS: 11 DEGREES
EKG T AXIS: 31 DEGREES
EKG VENTRICULAR RATE: 84 BPM
EOSINOPHILS RELATIVE PERCENT: 5 % (ref 1–7)
GFR AFRICAN AMERICAN: >60 ML/MIN
GFR NON-AFRICAN AMERICAN: >60 ML/MIN
GFR SERPL CREATININE-BSD FRML MDRD: NORMAL ML/MIN/{1.73_M2}
GFR SERPL CREATININE-BSD FRML MDRD: NORMAL ML/MIN/{1.73_M2}
GLUCOSE BLD-MCNC: 83 MG/DL (ref 70–99)
HCT VFR BLD CALC: 34.7 % (ref 36–46)
HEMOGLOBIN: 11.2 G/DL (ref 12–16)
IMMATURE GRANULOCYTES: ABNORMAL %
LYMPHOCYTES # BLD: 30 % (ref 16–46)
MCH RBC QN AUTO: 27.3 PG (ref 26–34)
MCHC RBC AUTO-ENTMCNC: 32.4 G/DL (ref 31–37)
MCV RBC AUTO: 84.2 FL (ref 80–100)
MONOCYTES # BLD: 7 % (ref 4–11)
MYOGLOBIN: 42 NG/ML (ref 25–58)
NRBC AUTOMATED: ABNORMAL PER 100 WBC
PDW BLD-RTO: 14 % (ref 11–14.5)
PLATELET # BLD: 343 K/UL (ref 140–450)
PLATELET ESTIMATE: ABNORMAL
PMV BLD AUTO: 8.4 FL (ref 6–12)
POC TROPONIN I: <0.02 NG/ML (ref 0–0.08)
POC TROPONIN INTERP: NORMAL
POTASSIUM SERPL-SCNC: 3.7 MMOL/L (ref 3.7–5.3)
RBC # BLD: 4.13 M/UL (ref 4–5.2)
RBC # BLD: ABNORMAL 10*6/UL
SEG NEUTROPHILS: 58 % (ref 43–77)
SEGMENTED NEUTROPHILS ABSOLUTE COUNT: 3.06 K/UL (ref 1.8–7.7)
SODIUM BLD-SCNC: 140 MMOL/L (ref 135–144)
WBC # BLD: 5.3 K/UL (ref 3.5–11)
WBC # BLD: ABNORMAL 10*3/UL

## 2018-05-31 PROCEDURE — 93000 ELECTROCARDIOGRAM COMPLETE: CPT | Performed by: INTERNAL MEDICINE

## 2018-05-31 PROCEDURE — 99285 EMERGENCY DEPT VISIT HI MDM: CPT

## 2018-05-31 PROCEDURE — 80048 BASIC METABOLIC PNL TOTAL CA: CPT

## 2018-05-31 PROCEDURE — 6360000002 HC RX W HCPCS: Performed by: EMERGENCY MEDICINE

## 2018-05-31 PROCEDURE — 85025 COMPLETE CBC W/AUTO DIFF WBC: CPT

## 2018-05-31 PROCEDURE — 99213 OFFICE O/P EST LOW 20 MIN: CPT | Performed by: INTERNAL MEDICINE

## 2018-05-31 PROCEDURE — 96374 THER/PROPH/DIAG INJ IV PUSH: CPT

## 2018-05-31 PROCEDURE — 93005 ELECTROCARDIOGRAM TRACING: CPT

## 2018-05-31 PROCEDURE — 83874 ASSAY OF MYOGLOBIN: CPT

## 2018-05-31 PROCEDURE — 71046 X-RAY EXAM CHEST 2 VIEWS: CPT

## 2018-05-31 PROCEDURE — 36415 COLL VENOUS BLD VENIPUNCTURE: CPT

## 2018-05-31 PROCEDURE — 84484 ASSAY OF TROPONIN QUANT: CPT

## 2018-05-31 RX ORDER — KETOROLAC TROMETHAMINE 30 MG/ML
30 INJECTION, SOLUTION INTRAMUSCULAR; INTRAVENOUS ONCE
Status: COMPLETED | OUTPATIENT
Start: 2018-05-31 | End: 2018-05-31

## 2018-05-31 RX ADMIN — KETOROLAC TROMETHAMINE 30 MG: 30 INJECTION, SOLUTION INTRAMUSCULAR at 12:42

## 2018-05-31 ASSESSMENT — ENCOUNTER SYMPTOMS
EYE PAIN: 0
VOMITING: 0
ABDOMINAL PAIN: 0
DIARRHEA: 0
BLOOD IN STOOL: 0
BACK PAIN: 0
SHORTNESS OF BREATH: 0
CONSTIPATION: 0
NAUSEA: 0
COUGH: 0

## 2018-05-31 ASSESSMENT — PAIN DESCRIPTION - DESCRIPTORS
DESCRIPTORS: SHARP;STABBING
DESCRIPTORS: ACHING

## 2018-05-31 ASSESSMENT — PAIN SCALES - GENERAL
PAINLEVEL_OUTOF10: 2
PAINLEVEL_OUTOF10: 2
PAINLEVEL_OUTOF10: 10
PAINLEVEL_OUTOF10: 10

## 2018-05-31 ASSESSMENT — PAIN DESCRIPTION - LOCATION: LOCATION: CHEST

## 2018-05-31 ASSESSMENT — PAIN DESCRIPTION - PAIN TYPE: TYPE: CHRONIC PAIN

## 2018-06-12 ENCOUNTER — TELEPHONE (OUTPATIENT)
Dept: CARDIOLOGY CLINIC | Age: 58
End: 2018-06-12

## 2018-06-12 ENCOUNTER — TELEPHONE (OUTPATIENT)
Dept: SURGERY | Age: 58
End: 2018-06-12

## 2018-06-12 DIAGNOSIS — R10.13 EPIGASTRIC PAIN: ICD-10-CM

## 2018-06-12 DIAGNOSIS — K58.2 IRRITABLE BOWEL SYNDROME WITH BOTH CONSTIPATION AND DIARRHEA: ICD-10-CM

## 2018-06-12 DIAGNOSIS — R07.9 CHEST PAIN, UNSPECIFIED TYPE: ICD-10-CM

## 2018-06-12 RX ORDER — DICYCLOMINE HYDROCHLORIDE 10 MG/1
10 CAPSULE ORAL 4 TIMES DAILY PRN
Qty: 120 CAPSULE | Refills: 3 | Status: SHIPPED | OUTPATIENT
Start: 2018-06-12 | End: 2021-06-04 | Stop reason: SDUPTHER

## 2018-06-20 DIAGNOSIS — M54.2 CERVICALGIA: ICD-10-CM

## 2018-06-20 DIAGNOSIS — M25.551 RIGHT HIP PAIN: ICD-10-CM

## 2018-06-20 DIAGNOSIS — M79.7 FIBROMYALGIA: ICD-10-CM

## 2018-06-20 RX ORDER — HYDROCODONE BITARTRATE AND ACETAMINOPHEN 10; 325 MG/1; MG/1
1 TABLET ORAL 4 TIMES DAILY
Qty: 120 TABLET | Refills: 0 | Status: SHIPPED | OUTPATIENT
Start: 2018-06-20 | End: 2018-07-18 | Stop reason: SDUPTHER

## 2018-06-22 DIAGNOSIS — E55.9 VITAMIN D DEFICIENCY: Primary | ICD-10-CM

## 2018-07-17 DIAGNOSIS — M25.551 RIGHT HIP PAIN: ICD-10-CM

## 2018-07-17 DIAGNOSIS — M79.7 FIBROMYALGIA: ICD-10-CM

## 2018-07-17 DIAGNOSIS — M54.2 CERVICALGIA: ICD-10-CM

## 2018-07-17 RX ORDER — HYDROCODONE BITARTRATE AND ACETAMINOPHEN 10; 325 MG/1; MG/1
1 TABLET ORAL 4 TIMES DAILY
Qty: 120 TABLET | Refills: 0 | OUTPATIENT
Start: 2018-07-17 | End: 2018-08-16

## 2018-07-18 ENCOUNTER — TELEPHONE (OUTPATIENT)
Dept: PAIN MANAGEMENT | Age: 58
End: 2018-07-18

## 2018-07-18 ENCOUNTER — OFFICE VISIT (OUTPATIENT)
Dept: PAIN MANAGEMENT | Age: 58
End: 2018-07-18
Payer: MEDICARE

## 2018-07-18 ENCOUNTER — HOSPITAL ENCOUNTER (OUTPATIENT)
Age: 58
Setting detail: SPECIMEN
Discharge: HOME | End: 2018-07-18
Payer: MEDICARE

## 2018-07-18 VITALS
DIASTOLIC BLOOD PRESSURE: 54 MMHG | SYSTOLIC BLOOD PRESSURE: 122 MMHG | BODY MASS INDEX: 32.59 KG/M2 | HEART RATE: 62 BPM | HEIGHT: 60 IN | WEIGHT: 166 LBS

## 2018-07-18 DIAGNOSIS — Z02.83 ENCOUNTER FOR DRUG SCREENING: ICD-10-CM

## 2018-07-18 DIAGNOSIS — Z79.891 ENCOUNTER FOR LONG-TERM OPIATE ANALGESIC USE: ICD-10-CM

## 2018-07-18 DIAGNOSIS — G89.29 CHRONIC PAIN OF MULTIPLE JOINTS: Primary | ICD-10-CM

## 2018-07-18 DIAGNOSIS — G89.29 ENCOUNTER FOR CHRONIC PAIN MANAGEMENT: ICD-10-CM

## 2018-07-18 DIAGNOSIS — M25.50 CHRONIC PAIN OF MULTIPLE JOINTS: Primary | ICD-10-CM

## 2018-07-18 DIAGNOSIS — M79.7 FIBROMYALGIA: ICD-10-CM

## 2018-07-18 PROCEDURE — 80307 DRUG TEST PRSMV CHEM ANLYZR: CPT

## 2018-07-18 PROCEDURE — G8427 DOCREV CUR MEDS BY ELIG CLIN: HCPCS | Performed by: NURSE PRACTITIONER

## 2018-07-18 PROCEDURE — 3017F COLORECTAL CA SCREEN DOC REV: CPT | Performed by: NURSE PRACTITIONER

## 2018-07-18 PROCEDURE — 99213 OFFICE O/P EST LOW 20 MIN: CPT | Performed by: NURSE PRACTITIONER

## 2018-07-18 PROCEDURE — 1036F TOBACCO NON-USER: CPT | Performed by: NURSE PRACTITIONER

## 2018-07-18 PROCEDURE — G8417 CALC BMI ABV UP PARAM F/U: HCPCS | Performed by: NURSE PRACTITIONER

## 2018-07-18 RX ORDER — HYDROCODONE BITARTRATE AND ACETAMINOPHEN 10; 325 MG/1; MG/1
1 TABLET ORAL 4 TIMES DAILY
Qty: 120 TABLET | Refills: 0 | Status: SHIPPED | OUTPATIENT
Start: 2018-07-20 | End: 2018-08-19

## 2018-07-18 NOTE — PROGRESS NOTES
Subjective:      Patient ID: Barbara Loyd is a 59 y.o. female. Chief Complaint   Patient presents with    Arm Pain     2 mo follow up     Shoulder Pain     HPI Here today for routine pain clinic recheck. Recent vacation, [place] and [place]. Due to travel, pain increases. She ran out of her Norco one week ago. Taking tylenol prn instead for the pain. Pain Assessment  Location of Pain: Arm  Location Modifiers: Left, Right  Severity of Pain: 8  Quality of Pain: Other (Comment) (stabbing )  Duration of Pain: Persistent  Frequency of Pain: Constant  Aggravating Factors: Other (Comment) (sitting )  Limiting Behavior: Yes  Relieving Factors: Heat, Other (Comment) (cream seems to help )  Are there other pain locations you wish to document?: No    Allergies   Allergen Reactions    Latex Swelling     The powder in the gloves    Dilaudid [Hydromorphone Hcl] Nausea And Vomiting    Hydromorphone     Medrol [Methylprednisolone] Other (See Comments)     Back pain    Prednisone Nausea And Vomiting       Outpatient Prescriptions Marked as Taking for the 7/18/18 encounter (Office Visit) with Devona Bloch, APRN - CNP   Medication Sig Dispense Refill    [START ON 7/20/2018] HYDROcodone-acetaminophen (NORCO)  MG per tablet Take 1 tablet by mouth 4 times daily for 30 days. . 120 tablet 0    diclofenac sodium 1 % GEL Apply 2 g topically 4 times daily 3 Tube 3    dicyclomine (BENTYL) 10 MG capsule Take 1 capsule by mouth 4 times daily as needed (Cramps/Abdominal Pain) 120 capsule 3    lisinopril (PRINIVIL;ZESTRIL) 10 MG tablet Take 1 tablet by mouth daily 30 tablet 11    cyclobenzaprine (FLEXERIL) 10 MG tablet Take 1 tablet by mouth 3 times daily as needed for Muscle spasms 90 tablet 11    Multiple Vitamins-Minerals (MULTI-VITAMIN/MINERALS PO) Take 1 tablet by mouth 2 times daily      Handicap Placard MISC by Does not apply route Issue parking placard for persons with disabilities. [facility] sec 4503.44. Length of time expected to have disablilty:36 months. Applicant meets the qualifying disability criteria. 1 each 0    ranitidine (ZANTAC) 150 MG tablet Take 1 tablet by mouth nightly 30 tablet 5    omeprazole (PRILOSEC) 20 MG delayed release capsule Take 1 capsule by mouth daily 30 capsule 5    Cholecalciferol (VITAMIN D3) 34871 units CAPS Take 1 capsule by mouth daily 30 capsule 5    docusate sodium (COLACE) 100 MG capsule Take 1 capsule by mouth 2 times daily 60 capsule 5    sucralfate (CARAFATE) 1 GM tablet Take 1 tablet by mouth 4 times daily as needed (abdominal pain) Take 1 hour prior to meals and bedtime. 60 tablet 0    Misc.  Devices (CANE) MISC 1 Units by Does not apply route as needed 1 each 0       Past Medical History:   Diagnosis Date    Anxiety     Asthma     Cerebral artery occlusion with cerebral infarction [facility]) 2009    Cerebrovascular disease     stroke 1998    Depression     Diverticulitis     Fibromyalgia     History of small bowel obstruction     Hypertension     Lupus     Seizures ([facility] [address]  and        Past Surgical History:   Procedure Laterality Date    APPENDECTOMY       SECTION      x 4    COLONOSCOPY      nl    COLONOSCOPY  2015    1 bx    DILATION AND CURETTAGE OF UTERUS      HYSTERECTOMY  2009    TAHBSO due to fibroids MMR    OTHER SURGICAL HISTORY Left 2016    C6 TFE    [place] EGD TRANSORAL BIOPSY SINGLE/MULTIPLE N/A 2017    EGD BIOPSY performed by Mellissa Whitehead MD at [address] [facility]  3/1/16    16 removed on bottom 1 on top     TUBAL LIGATION         Family History   Problem Relation Age of Onset    Diabetes Mother         Passed due to DM at 62    Other Mother         enlarged heart    Dementia Father     Other Father         Brain tumor     Diabetes Maternal Aunt     Diabetes Maternal Uncle     Glaucoma Neg Hx        Social History     Social History    Marital status: Legally      Spouse name: N/A   Heide Salas

## 2018-07-21 LAB

## 2018-08-14 ENCOUNTER — TELEPHONE (OUTPATIENT)
Dept: PAIN MANAGEMENT | Age: 58
End: 2018-08-14

## 2018-08-20 ENCOUNTER — TELEPHONE (OUTPATIENT)
Dept: PAIN MANAGEMENT | Age: 58
End: 2018-08-20

## 2018-08-20 NOTE — TELEPHONE ENCOUNTER
Patient called today, and was very upset about the termination letter that she received. Patient stated that she was called in for a random and was in [place] at the time. Patient states that she does not have good service with her [employer] mobile phone down there. She wasn't scheduled to be back until after the first of the month, but she drove back early with her daughter and cut her vacation short so she could come back and call the office. However once she got back she looked through her mail and found her letter. Patient does not understand why she wasn't allowed to go on vacation. Patient also feels that she is being targeted because she is a \"broke, black lady who has sickle cell\"  Patient states Aldo Mattolphus did not ask for this disease nor did she ask for the pain that is associated with it\"  Writer acknowledged her, but told her we were not targeting her by any means. Patient proceeded then to say \"what white person get's sickle cell\", writer then had to remind patient that as unfortunate as it is to have such a disease it is not in a \"white person's genetic make up to typically have that disease and that is NOT why she was being terminated\"  Pt apologized for being rude, states that she want STEPHANI Guo to call her personally so she can talk with her in regards to this letter. Writer let her know that providers typically DO NOT contact patient via phone, but I would be more than happy to relay any message she had for her. Patient did not want writer to inform Shana Jurado that I spoke with her, as she said she would talk with her when she called her. Again writer states that [employer] does not make personal calls as that is what nursing staff is for. Patient is also frustrated that the rehab facility list was in here letter as now she is labeled as a drug seeker and she is NOT a drug seeker.   Patient offered to come in and take a urine test, states that she has been out of medications for two days now and if we

## 2018-08-27 DIAGNOSIS — K21.9 GASTROESOPHAGEAL REFLUX DISEASE, ESOPHAGITIS PRESENCE NOT SPECIFIED: ICD-10-CM

## 2018-08-27 DIAGNOSIS — E55.9 VITAMIN D DEFICIENCY: Primary | ICD-10-CM

## 2018-08-27 RX ORDER — OMEPRAZOLE 20 MG/1
CAPSULE, DELAYED RELEASE ORAL
Qty: 30 CAPSULE | Refills: 0 | OUTPATIENT
Start: 2018-08-27

## 2018-08-27 RX ORDER — CHOLECALCIFEROL (VITAMIN D3) 125 MCG
CAPSULE ORAL
Qty: 60 CAPSULE | Refills: 0 | OUTPATIENT
Start: 2018-08-27

## 2018-08-27 NOTE — TELEPHONE ENCOUNTER
Last appt 11-27-17. Pt FTKA 6-25-18. Attempted to call pt to remind her to schedule an appt- no answer. Will decline refills for now till pt makes appt since both Omeprazole & Vit D3 can be obtained OTC without a prescription.

## 2018-11-19 ENCOUNTER — APPOINTMENT (OUTPATIENT)
Dept: CT IMAGING | Age: 58
DRG: 305 | End: 2018-11-19
Payer: MEDICARE

## 2018-11-19 ENCOUNTER — HOSPITAL ENCOUNTER (INPATIENT)
Age: 58
LOS: 1 days | Discharge: HOME | DRG: 305 | End: 2018-11-20
Attending: EMERGENCY MEDICINE | Admitting: FAMILY MEDICINE
Payer: MEDICARE

## 2018-11-19 PROBLEM — R55 NEAR SYNCOPE: Status: ACTIVE | Noted: 2018-11-19

## 2018-11-19 LAB
ABSOLUTE EOS #: 0.3 K/UL (ref 0–0.4)
ABSOLUTE IMMATURE GRANULOCYTE: ABNORMAL K/UL (ref 0–0.3)
ABSOLUTE LYMPH #: 1.8 K/UL (ref 1–4.8)
ABSOLUTE MONO #: 0.6 K/UL (ref 0.1–1.2)
ANION GAP SERPL CALCULATED.3IONS-SCNC: 15 MMOL/L (ref 9–17)
BASOPHILS # BLD: 1 % (ref 0–1)
BASOPHILS ABSOLUTE: 0 K/UL (ref 0–0.2)
BNP INTERPRETATION: NORMAL
BUN BLDV-MCNC: 12 MG/DL (ref 8–23)
BUN/CREAT BLD: 18 (ref 9–20)
CALCIUM SERPL-MCNC: 9.4 MG/DL (ref 8.6–10.4)
CHLORIDE BLD-SCNC: 101 MMOL/L (ref 98–107)
CO2: 22 MMOL/L (ref 20–31)
CREAT SERPL-MCNC: 0.66 MG/DL (ref 0.5–0.9)
DIFFERENTIAL TYPE: ABNORMAL
EKG ATRIAL RATE: 108 BPM
EKG P AXIS: 44 DEGREES
EKG P-R INTERVAL: 142 MS
EKG Q-T INTERVAL: 356 MS
EKG QRS DURATION: 76 MS
EKG QTC CALCULATION (BAZETT): 477 MS
EKG R AXIS: 16 DEGREES
EKG T AXIS: 24 DEGREES
EKG VENTRICULAR RATE: 108 BPM
EOSINOPHILS RELATIVE PERCENT: 4 % (ref 1–7)
GFR AFRICAN AMERICAN: >60 ML/MIN
GFR NON-AFRICAN AMERICAN: >60 ML/MIN
GFR SERPL CREATININE-BSD FRML MDRD: ABNORMAL ML/MIN/{1.73_M2}
GFR SERPL CREATININE-BSD FRML MDRD: ABNORMAL ML/MIN/{1.73_M2}
GLUCOSE BLD-MCNC: 111 MG/DL (ref 70–99)
HCT VFR BLD CALC: 36.8 % (ref 36–46)
HEMOGLOBIN: 11.8 G/DL (ref 12–16)
IMMATURE GRANULOCYTES: ABNORMAL %
INR BLD: 1
LYMPHOCYTES # BLD: 24 % (ref 16–46)
MCH RBC QN AUTO: 26.5 PG (ref 26–34)
MCHC RBC AUTO-ENTMCNC: 32 G/DL (ref 31–37)
MCV RBC AUTO: 82.8 FL (ref 80–100)
MONOCYTES # BLD: 8 % (ref 4–11)
NRBC AUTOMATED: ABNORMAL PER 100 WBC
PARTIAL THROMBOPLASTIN TIME: 26.4 SEC (ref 27–35)
PDW BLD-RTO: 14.9 % (ref 11–14.5)
PLATELET # BLD: 370 K/UL (ref 140–450)
PLATELET ESTIMATE: ABNORMAL
PMV BLD AUTO: 7.9 FL (ref 6–12)
POTASSIUM SERPL-SCNC: 3.5 MMOL/L (ref 3.7–5.3)
PRO-BNP: 20 PG/ML
PROTHROMBIN TIME: 10.4 SEC (ref 9.4–11.3)
RBC # BLD: 4.45 M/UL (ref 4–5.2)
RBC # BLD: ABNORMAL 10*6/UL
SEG NEUTROPHILS: 63 % (ref 43–77)
SEGMENTED NEUTROPHILS ABSOLUTE COUNT: 4.9 K/UL (ref 1.8–7.7)
SODIUM BLD-SCNC: 138 MMOL/L (ref 135–144)
TROPONIN INTERP: NORMAL
TROPONIN INTERP: NORMAL
TROPONIN T: <0.03 NG/ML
TROPONIN T: <0.03 NG/ML
WBC # BLD: 7.5 K/UL (ref 3.5–11)
WBC # BLD: ABNORMAL 10*3/UL

## 2018-11-19 PROCEDURE — 84484 ASSAY OF TROPONIN QUANT: CPT

## 2018-11-19 PROCEDURE — 70450 CT HEAD/BRAIN W/O DYE: CPT

## 2018-11-19 PROCEDURE — 2580000003 HC RX 258: Performed by: NURSE PRACTITIONER

## 2018-11-19 PROCEDURE — 93005 ELECTROCARDIOGRAM TRACING: CPT

## 2018-11-19 PROCEDURE — 80048 BASIC METABOLIC PNL TOTAL CA: CPT

## 2018-11-19 PROCEDURE — 36415 COLL VENOUS BLD VENIPUNCTURE: CPT

## 2018-11-19 PROCEDURE — 85610 PROTHROMBIN TIME: CPT

## 2018-11-19 PROCEDURE — 6370000000 HC RX 637 (ALT 250 FOR IP): Performed by: EMERGENCY MEDICINE

## 2018-11-19 PROCEDURE — 83880 ASSAY OF NATRIURETIC PEPTIDE: CPT

## 2018-11-19 PROCEDURE — 96361 HYDRATE IV INFUSION ADD-ON: CPT

## 2018-11-19 PROCEDURE — 96374 THER/PROPH/DIAG INJ IV PUSH: CPT

## 2018-11-19 PROCEDURE — 6360000002 HC RX W HCPCS: Performed by: EMERGENCY MEDICINE

## 2018-11-19 PROCEDURE — 6360000004 HC RX CONTRAST MEDICATION: Performed by: EMERGENCY MEDICINE

## 2018-11-19 PROCEDURE — 99285 EMERGENCY DEPT VISIT HI MDM: CPT

## 2018-11-19 PROCEDURE — 71260 CT THORAX DX C+: CPT

## 2018-11-19 PROCEDURE — 85730 THROMBOPLASTIN TIME PARTIAL: CPT

## 2018-11-19 PROCEDURE — 85025 COMPLETE CBC W/AUTO DIFF WBC: CPT

## 2018-11-19 PROCEDURE — 2060000000 HC ICU INTERMEDIATE R&B

## 2018-11-19 RX ORDER — NITROGLYCERIN 0.4 MG/1
0.4 TABLET SUBLINGUAL EVERY 5 MIN PRN
Status: DISCONTINUED | OUTPATIENT
Start: 2018-11-19 | End: 2018-11-19 | Stop reason: SDUPTHER

## 2018-11-19 RX ORDER — SODIUM CHLORIDE 0.9 % (FLUSH) 0.9 %
10 SYRINGE (ML) INJECTION EVERY 12 HOURS SCHEDULED
Status: DISCONTINUED | OUTPATIENT
Start: 2018-11-19 | End: 2018-11-20 | Stop reason: HOSPADM

## 2018-11-19 RX ORDER — ASPIRIN 81 MG/1
324 TABLET, CHEWABLE ORAL ONCE
Status: COMPLETED | OUTPATIENT
Start: 2018-11-19 | End: 2018-11-19

## 2018-11-19 RX ORDER — DOCUSATE SODIUM 100 MG/1
100 CAPSULE, LIQUID FILLED ORAL 2 TIMES DAILY
Status: DISCONTINUED | OUTPATIENT
Start: 2018-11-19 | End: 2018-11-20 | Stop reason: HOSPADM

## 2018-11-19 RX ORDER — PANTOPRAZOLE SODIUM 40 MG/1
40 TABLET, DELAYED RELEASE ORAL
Status: DISCONTINUED | OUTPATIENT
Start: 2018-11-20 | End: 2018-11-20 | Stop reason: HOSPADM

## 2018-11-19 RX ORDER — POTASSIUM CHLORIDE 7.45 MG/ML
10 INJECTION INTRAVENOUS PRN
Status: DISCONTINUED | OUTPATIENT
Start: 2018-11-19 | End: 2018-11-20 | Stop reason: HOSPADM

## 2018-11-19 RX ORDER — SODIUM CHLORIDE 0.9 % (FLUSH) 0.9 %
10 SYRINGE (ML) INJECTION PRN
Status: DISCONTINUED | OUTPATIENT
Start: 2018-11-19 | End: 2018-11-20 | Stop reason: HOSPADM

## 2018-11-19 RX ORDER — POTASSIUM CHLORIDE 20 MEQ/1
40 TABLET, EXTENDED RELEASE ORAL PRN
Status: DISCONTINUED | OUTPATIENT
Start: 2018-11-19 | End: 2018-11-20 | Stop reason: HOSPADM

## 2018-11-19 RX ORDER — SODIUM CHLORIDE 9 MG/ML
INJECTION, SOLUTION INTRAVENOUS CONTINUOUS
Status: DISCONTINUED | OUTPATIENT
Start: 2018-11-19 | End: 2018-11-20 | Stop reason: HOSPADM

## 2018-11-19 RX ORDER — KETOROLAC TROMETHAMINE 30 MG/ML
30 INJECTION, SOLUTION INTRAMUSCULAR; INTRAVENOUS ONCE
Status: COMPLETED | OUTPATIENT
Start: 2018-11-19 | End: 2018-11-19

## 2018-11-19 RX ORDER — FAMOTIDINE 20 MG/1
40 TABLET, FILM COATED ORAL EVERY EVENING
Status: DISCONTINUED | OUTPATIENT
Start: 2018-11-19 | End: 2018-11-20 | Stop reason: HOSPADM

## 2018-11-19 RX ORDER — ONDANSETRON 2 MG/ML
4 INJECTION INTRAMUSCULAR; INTRAVENOUS EVERY 6 HOURS PRN
Status: DISCONTINUED | OUTPATIENT
Start: 2018-11-19 | End: 2018-11-20 | Stop reason: HOSPADM

## 2018-11-19 RX ORDER — POTASSIUM CHLORIDE 20MEQ/15ML
40 LIQUID (ML) ORAL PRN
Status: DISCONTINUED | OUTPATIENT
Start: 2018-11-19 | End: 2018-11-20 | Stop reason: HOSPADM

## 2018-11-19 RX ORDER — MAGNESIUM SULFATE 1 G/100ML
1 INJECTION INTRAVENOUS PRN
Status: DISCONTINUED | OUTPATIENT
Start: 2018-11-19 | End: 2018-11-20 | Stop reason: HOSPADM

## 2018-11-19 RX ORDER — NITROGLYCERIN 0.4 MG/1
0.4 TABLET SUBLINGUAL EVERY 5 MIN PRN
Status: DISCONTINUED | OUTPATIENT
Start: 2018-11-19 | End: 2018-11-20 | Stop reason: HOSPADM

## 2018-11-19 RX ORDER — LISINOPRIL 5 MG/1
10 TABLET ORAL DAILY
Status: DISCONTINUED | OUTPATIENT
Start: 2018-11-20 | End: 2018-11-20 | Stop reason: HOSPADM

## 2018-11-19 RX ORDER — NICOTINE 21 MG/24HR
1 PATCH, TRANSDERMAL 24 HOURS TRANSDERMAL DAILY PRN
Status: DISCONTINUED | OUTPATIENT
Start: 2018-11-19 | End: 2018-11-20 | Stop reason: HOSPADM

## 2018-11-19 RX ORDER — ACETAMINOPHEN 325 MG/1
650 TABLET ORAL EVERY 4 HOURS PRN
Status: DISCONTINUED | OUTPATIENT
Start: 2018-11-19 | End: 2018-11-20 | Stop reason: HOSPADM

## 2018-11-19 RX ORDER — BISACODYL 10 MG
10 SUPPOSITORY, RECTAL RECTAL DAILY PRN
Status: DISCONTINUED | OUTPATIENT
Start: 2018-11-19 | End: 2018-11-20 | Stop reason: HOSPADM

## 2018-11-19 RX ADMIN — NITROGLYCERIN 0.4 MG: 0.4 TABLET SUBLINGUAL at 19:49

## 2018-11-19 RX ADMIN — ASPIRIN 81 MG 324 MG: 81 TABLET ORAL at 19:48

## 2018-11-19 RX ADMIN — Medication 10 ML: at 23:06

## 2018-11-19 RX ADMIN — SODIUM CHLORIDE: 9 INJECTION, SOLUTION INTRAVENOUS at 23:06

## 2018-11-19 RX ADMIN — IOPAMIDOL 80 ML: 755 INJECTION, SOLUTION INTRAVENOUS at 20:06

## 2018-11-19 RX ADMIN — KETOROLAC TROMETHAMINE 30 MG: 30 INJECTION, SOLUTION INTRAMUSCULAR at 20:34

## 2018-11-19 ASSESSMENT — PAIN DESCRIPTION - PAIN TYPE
TYPE: ACUTE PAIN

## 2018-11-19 ASSESSMENT — PAIN DESCRIPTION - FREQUENCY: FREQUENCY: CONTINUOUS

## 2018-11-19 ASSESSMENT — PAIN DESCRIPTION - ONSET: ONSET: SUDDEN

## 2018-11-19 ASSESSMENT — PAIN DESCRIPTION - DESCRIPTORS: DESCRIPTORS: THROBBING

## 2018-11-19 ASSESSMENT — PAIN SCALES - GENERAL
PAINLEVEL_OUTOF10: 7
PAINLEVEL_OUTOF10: 7
PAINLEVEL_OUTOF10: 3
PAINLEVEL_OUTOF10: 3
PAINLEVEL_OUTOF10: 7
PAINLEVEL_OUTOF10: 2

## 2018-11-19 ASSESSMENT — PAIN DESCRIPTION - LOCATION
LOCATION: CHEST
LOCATION: CHEST

## 2018-11-19 ASSESSMENT — PAIN DESCRIPTION - ORIENTATION: ORIENTATION: MID

## 2018-11-19 ASSESSMENT — PAIN DESCRIPTION - PROGRESSION: CLINICAL_PROGRESSION: NOT CHANGED

## 2018-11-20 VITALS
SYSTOLIC BLOOD PRESSURE: 138 MMHG | HEART RATE: 88 BPM | BODY MASS INDEX: 33.38 KG/M2 | OXYGEN SATURATION: 98 % | HEIGHT: 60 IN | WEIGHT: 170 LBS | DIASTOLIC BLOOD PRESSURE: 72 MMHG | TEMPERATURE: 98.2 F | RESPIRATION RATE: 18 BRPM

## 2018-11-20 LAB
ANION GAP SERPL CALCULATED.3IONS-SCNC: 12 MMOL/L (ref 9–17)
BNP INTERPRETATION: NORMAL
BUN BLDV-MCNC: 13 MG/DL (ref 8–23)
BUN/CREAT BLD: 17 (ref 9–20)
CALCIUM SERPL-MCNC: 8.8 MG/DL (ref 8.6–10.4)
CHLORIDE BLD-SCNC: 107 MMOL/L (ref 98–107)
CO2: 23 MMOL/L (ref 20–31)
CREAT SERPL-MCNC: 0.75 MG/DL (ref 0.5–0.9)
GFR AFRICAN AMERICAN: >60 ML/MIN
GFR NON-AFRICAN AMERICAN: >60 ML/MIN
GFR SERPL CREATININE-BSD FRML MDRD: ABNORMAL ML/MIN/{1.73_M2}
GFR SERPL CREATININE-BSD FRML MDRD: ABNORMAL ML/MIN/{1.73_M2}
GLUCOSE BLD-MCNC: 106 MG/DL (ref 70–99)
HCT VFR BLD CALC: 36.2 % (ref 36–46)
HEMOGLOBIN: 11.7 G/DL (ref 12–16)
LV EF: 55 %
LVEF MODALITY: NORMAL
MAGNESIUM: 1.9 MG/DL (ref 1.6–2.6)
MCH RBC QN AUTO: 26.8 PG (ref 26–34)
MCHC RBC AUTO-ENTMCNC: 32.3 G/DL (ref 31–37)
MCV RBC AUTO: 83.1 FL (ref 80–100)
NRBC AUTOMATED: ABNORMAL PER 100 WBC
PDW BLD-RTO: 14.6 % (ref 11–14.5)
PLATELET # BLD: 345 K/UL (ref 140–450)
PMV BLD AUTO: 8.4 FL (ref 6–12)
POTASSIUM SERPL-SCNC: 3.9 MMOL/L (ref 3.7–5.3)
PRO-BNP: 20 PG/ML
RBC # BLD: 4.36 M/UL (ref 4–5.2)
SODIUM BLD-SCNC: 142 MMOL/L (ref 135–144)
TROPONIN INTERP: NORMAL
TROPONIN INTERP: NORMAL
TROPONIN T: <0.03 NG/ML
TROPONIN T: <0.03 NG/ML
WBC # BLD: 5.9 K/UL (ref 3.5–11)

## 2018-11-20 PROCEDURE — 6360000002 HC RX W HCPCS: Performed by: NURSE PRACTITIONER

## 2018-11-20 PROCEDURE — 6370000000 HC RX 637 (ALT 250 FOR IP): Performed by: NURSE PRACTITIONER

## 2018-11-20 PROCEDURE — 96372 THER/PROPH/DIAG INJ SC/IM: CPT

## 2018-11-20 PROCEDURE — 84484 ASSAY OF TROPONIN QUANT: CPT

## 2018-11-20 PROCEDURE — 36415 COLL VENOUS BLD VENIPUNCTURE: CPT

## 2018-11-20 PROCEDURE — 85027 COMPLETE CBC AUTOMATED: CPT

## 2018-11-20 PROCEDURE — 80048 BASIC METABOLIC PNL TOTAL CA: CPT

## 2018-11-20 PROCEDURE — 83880 ASSAY OF NATRIURETIC PEPTIDE: CPT

## 2018-11-20 PROCEDURE — 99217 PR OBSERVATION CARE DISCHARGE MANAGEMENT: CPT | Performed by: INTERNAL MEDICINE

## 2018-11-20 PROCEDURE — 2580000003 HC RX 258: Performed by: NURSE PRACTITIONER

## 2018-11-20 PROCEDURE — 83735 ASSAY OF MAGNESIUM: CPT

## 2018-11-20 PROCEDURE — 96361 HYDRATE IV INFUSION ADD-ON: CPT

## 2018-11-20 PROCEDURE — 93306 TTE W/DOPPLER COMPLETE: CPT

## 2018-11-20 PROCEDURE — 99222 1ST HOSP IP/OBS MODERATE 55: CPT | Performed by: INTERNAL MEDICINE

## 2018-11-20 PROCEDURE — 94761 N-INVAS EAR/PLS OXIMETRY MLT: CPT

## 2018-11-20 RX ORDER — NICOTINE 21 MG/24HR
1 PATCH, TRANSDERMAL 24 HOURS TRANSDERMAL EVERY 24 HOURS
Qty: 30 PATCH | Refills: 0 | COMMUNITY
Start: 2018-11-20 | End: 2020-09-16

## 2018-11-20 RX ORDER — CYCLOBENZAPRINE HCL 10 MG
10 TABLET ORAL 3 TIMES DAILY PRN
Status: DISCONTINUED | OUTPATIENT
Start: 2018-11-20 | End: 2018-11-20 | Stop reason: HOSPADM

## 2018-11-20 RX ADMIN — LISINOPRIL 10 MG: 5 TABLET ORAL at 10:00

## 2018-11-20 RX ADMIN — ENOXAPARIN SODIUM 40 MG: 40 INJECTION SUBCUTANEOUS at 10:00

## 2018-11-20 RX ADMIN — POTASSIUM CHLORIDE 40 MEQ: 20 TABLET, EXTENDED RELEASE ORAL at 02:33

## 2018-11-20 RX ADMIN — DOCUSATE SODIUM 100 MG: 100 CAPSULE, LIQUID FILLED ORAL at 10:00

## 2018-11-20 RX ADMIN — DOCUSATE SODIUM 100 MG: 100 CAPSULE, LIQUID FILLED ORAL at 00:19

## 2018-11-20 RX ADMIN — PANTOPRAZOLE SODIUM 40 MG: 40 TABLET, DELAYED RELEASE ORAL at 05:53

## 2018-11-20 RX ADMIN — FAMOTIDINE 40 MG: 20 TABLET ORAL at 00:31

## 2018-11-20 RX ADMIN — SODIUM CHLORIDE: 9 INJECTION, SOLUTION INTRAVENOUS at 12:12

## 2018-11-20 RX ADMIN — CYCLOBENZAPRINE HYDROCHLORIDE 10 MG: 10 TABLET, FILM COATED ORAL at 00:18

## 2018-11-20 ASSESSMENT — PAIN DESCRIPTION - DESCRIPTORS: DESCRIPTORS: DULL

## 2018-11-20 ASSESSMENT — PAIN SCALES - GENERAL
PAINLEVEL_OUTOF10: 0
PAINLEVEL_OUTOF10: 4

## 2018-11-20 ASSESSMENT — PAIN DESCRIPTION - LOCATION: LOCATION: SHOULDER

## 2018-11-20 ASSESSMENT — PAIN DESCRIPTION - PAIN TYPE: TYPE: ACUTE PAIN

## 2018-11-20 ASSESSMENT — PAIN DESCRIPTION - ORIENTATION: ORIENTATION: LEFT

## 2018-11-20 NOTE — CONSULTS
· Eyes: No visual changes or diplopia. No scleral icterus. · ENT: No Headaches, hearing loss or vertigo. No mouth sores or sore throat. · Cardiovascular: No cardiac history  · Respiratory: No previous pulmonary problems  · Gastrointestinal: No abdominal pain, appetite loss, blood in stools. No change in bowel or bladder habits. · Genitourinary: No dysuria, trouble voiding, or hematuria. · Musculoskeletal:  No gait disturbance, No weakness or joint complaints. · Integumentary: No rash or pruritis. · Neurological: No headache, diplopia, change in muscle strength, numbness or tingling. No change in gait, balance, coordination, mood, affect, memory, mentation, behavior. · Psychiatric: No anxiety, or depression. · Endocrine: No temperature intolerance. No excessive thirst, fluid intake, or urination. No tremor. · Hematologic/Lymphatic: No abnormal bruising or bleeding, blood clots or swollen lymph nodes. · Allergic/Immunologic: No nasal congestion or hives. PHYSICAL EXAM:      /72   Pulse 88   Temp 98.2 °F (36.8 °C) (Tympanic)   Resp 18   Ht 4' 11.84\" (1.52 m)   Wt 170 lb (77.1 kg)   SpO2 98%   BMI 33.38 kg/m²    Constitutional and General Appearance: alert, cooperative, no distress and appears stated age  HEENT: PERRL, no cervical lymphadenopathy. No masses palpable. Normal oral mucosa  Respiratory:  · Normal excursion and expansion without use of accessory muscles  · Resp Auscultation: Good respiratory effort. No for increased work of breathing.  On auscultation: clear to auscultation bilaterally  Cardiovascular:  · The apical impulse is not displaced  · Heart tones are crisp and normal. regular S1 and S2.  · Jugular venous pulsation Normal  · The carotid upstroke is normal in amplitude and contour without delay or bruit  · Peripheral pulses are symmetrical and full   Abdomen:   · No masses or tenderness  · Bowel sounds present  Extremities:  ·  No Cyanosis or Clubbing  ·  Lower extremity edema: No  ·  Skin: Warm and dry  Neurological:  · Alert and oriented. · Moves all extremities well  · No abnormalities of mood, affect, memory, mentation, or behavior are noted        EKG:    Date: 11/20/18  Reading: No acute ischemia      LAST ECHO:  Date:  Findings Summary:      LAST Stress Test:   Date of last ST:  Major Findings:    LAST Cardiac Angiography:.  Date:  Findings:      Labs:     CBC:   Recent Labs      11/19/18 1925 11/20/18   0511   WBC  7.5  5.9   HGB  11.8*  11.7*   HCT  36.8  36.2   PLT  370  345     BMP:   Recent Labs      11/19/18 1925 11/20/18   0511   NA  138  142   K  3.5*  3.9   CO2  22  23   BUN  12  13   CREATININE  0.66  0.75   LABGLOM  >60  >60   GLUCOSE  111*  106*     BNP: No results for input(s): BNP in the last 72 hours. PT/INR:   Recent Labs      11/19/18 1925   PROTIME  10.4   INR  1.0     APTT:  Recent Labs      11/19/18 1925   APTT  26.4*     CARDIAC ENZYMES:No results for input(s): CKTOTAL, CKMB, CKMBINDEX, TROPONINI in the last 72 hours. FASTING LIPID PANEL:  Lab Results   Component Value Date    HDL 36 11/27/2017    TRIG 243 11/27/2017     LIVER PROFILE:No results for input(s): AST, ALT, LABALBU in the last 72 hours. Other Current Problems  Patient Active Problem List   Diagnosis    Hyperopia of both eyes with astigmatism and presbyopia    Cortical cataract of left eye    Lupus    Hypertension    Fibromyalgia    Anxiety    Depression    IBS (irritable bowel syndrome)    Anemia    Chest pain    SBO (small bowel obstruction) (HCC)    Right hip pain    Cervicalgia    Cervical radicular pain    Left upper quadrant pain    Epigastric pain    Facet arthritis of cervical region ([address]    Chronic left shoulder pain    Encounter for chronic pain management    Vitamin D deficiency    Near syncope           IMPRESSION & Recommendations:    1. Non Cardiac Chest Wall Pain and headache. Improved with BP control. May be due to lupus flair ups.  Doubt

## 2018-11-20 NOTE — H&P
Problem Relation Age of Onset    Diabetes Mother         Passed due to DM at 62    Other Mother         enlarged heart    Dementia Father     Other Father         Brain tumor     Diabetes Maternal Aunt     Diabetes Maternal Uncle     Glaucoma Neg Hx      REVIEW OF SYSTEMS:  CONSTITUTIONAL:  negative  EYES:  negative  HEENT:  negative  RESPIRATORY:  negative  CARDIOVASCULAR:  negative  GASTROINTESTINAL:  negative  MUSCULOSKELETAL:  positive for  myalgias, arthralgias and pain  NEUROLOGICAL:  negative  BEHAVIOR/PSYCH:  negative  PHYSICAL EXAM:    Vitals:  /64   Pulse 87   Temp 97.5 °F (36.4 °C) (Tympanic)   Resp 16   Ht 4' 11.84\" (1.52 m)   Wt 169 lb 3 oz (76.7 kg)   SpO2 97%   BMI 33.22 kg/m²     CONSTITUTIONAL:  awake, alert, cooperative, no apparent distress, and appears stated age  EYES:  Lids and lashes normal, pupils equal, round and reactive to light, extra ocular muscles intact, sclera clear, conjunctiva normal  ENT:  Normocephalic, without obvious abnormality, atraumatic, sinuses nontender on palpation, external ears without lesions, oral pharynx with moist mucus membranes, tonsils without erythema or exudates, gums normal and good dentition.   NECK:  Supple, symmetrical, trachea midline, no adenopathy, thyroid symmetric, not enlarged and no tenderness, skin normal  HEMATOLOGIC/LYMPHATICS:  no cervical lymphadenopathy  BACK:  Symmetric, no curvature, spinous processes are non-tender on palpation, paraspinous muscles are non-tender on palpation, no costal vertebral tenderness  LUNGS:  No increased work of breathing, good air exchange, clear to auscultation bilaterally, no crackles or wheezing  CARDIOVASCULAR:  Normal apical impulse, regular rate and rhythm, normal S1 and S2, no S3 or S4, and no murmur noted and reproducible chest wall tenderness on the left sternal border  ABDOMEN:  No scars, normal bowel sounds, soft, non-distended, non-tender, no masses palpated, no

## 2018-11-20 NOTE — ED PROVIDER NOTES
Kristy Lees MD  11/27/18 6239
results found for this visit on 11/19/18. EMERGENCY DEPARTMENT COURSE:   Vitals:    Vitals:    11/19/18 1905   BP: (!) 173/84   Pulse: 108   Resp: 16   Temp: 98.5 °F (36.9 °C)   TempSrc: Tympanic   SpO2: 98%   Weight: 165 lb (74.8 kg)     -------------------------  BP: (!) 173/84, Temp: 98.5 °F (36.9 °C), Pulse: 108, Resp: 16      Re-evaluation Notes    Patient will be endorsed to  [address] [place] [address] secondary to end of shift. Please refer to his documentation. FINAL IMPRESSION    No diagnosis found. DISPOSITION/PLAN   DISPOSITION        Condition on Disposition    stable    PATIENT REFERRED TO:  No follow-up provider specified.     DISCHARGE MEDICATIONS:  New Prescriptions    No medications on file       (Please note that portions of this note were completed with a voice recognition program.  Efforts were made to edit the dictations but occasionally words are mis-transcribed.)    Chandra MD   Attending Emergency Physician         Kishor Sherman MD  11/19/18 Abhijit Patterson MD  11/22/18 0480 [facility] MD Robbie  12/05/18 4280

## 2018-11-20 NOTE — PROGRESS NOTES
Occupational Therapy        Patient declined OT services, stating they are at their baseline level of function. Patient notes they are independent with functional mobility, toileting and simple ADLs. Patient discharged per their request. Patient was advised to inform staff if there is a decline in function or if they have any questions, and therapy can return at that time. Patient verbalized understanding.
CVA----right-sided weakness          CT brain---4. 1.---no acute changes  Lupus  Fibromyalgia  Depression   Tobacco abuse----quit--.--??? Hypokalemia   PMH:  Irritable bowel syndrome, anemia, hyperopia--              astigmatism--presbyopia, diverticulitis, H. pylori--              2016--treated, anemia---hemoglobin =  7.5--              4. 4. , MI ruled out---4. 2., SBO--abdominal pain--              6. 6.2016--and--4. 1.  PSH:   appendectomy,  x 4, EDU-BSO--fibroids--,                tubal ligation, EGD--PUD, colonoscopy--normal----              biopsy, D&C--uterus      Allergies:         LATEX--swelling,    Intolerances:   Medrol--mehthyprednisolone--back pain,  prednisone--                          nausea-vomiting, Dilaudid--hydromorphone---nausea-vomiting      Smoking cessation medication patient was given at discharge:  Nicotine Patch    Reason no smoking cessation medication given Not Applicable    Plan:  1. Home---2018  2. Nicotine patch  3. Home medications reviewed  4. Follow [facility]--[facility] office appointment  5. Follow up Odessa Berger NP--[facility]  6.    See orders     Electronically signed by Scooter Baltazar on 2018 at 11:56 AM    Hospitalist

## 2018-11-21 ENCOUNTER — CARE COORDINATION (OUTPATIENT)
Dept: CASE MANAGEMENT | Age: 58
End: 2018-11-21

## 2018-11-21 DIAGNOSIS — R55 NEAR SYNCOPE: Primary | ICD-10-CM

## 2018-11-21 PROCEDURE — 1111F DSCHRG MED/CURRENT MED MERGE: CPT | Performed by: NURSE PRACTITIONER

## 2018-11-21 NOTE — CARE COORDINATION
[facility] Transitions Follow Up Call    2018    Patient: John Ramirez  Patient : 1960   MRN: [id number]  Reason for Admission: There are no discharge diagnoses documented for the most recent discharge. Discharge Date: 18 RARS: Readmission Risk Score: 12       Spoke with: [facility] Transitions Subsequent and Final Call    Subsequent and Final Calls  Do you have any ongoing symptoms?:  Yes  Onset of Patient-reported symptoms:  Today  Patient-reported symptoms:  Fatigue, Weakness  Interventions for patient-reported symptoms:  Other  Have your medications changed?:  No  Do you have any questions related to your medications?:  No  Do you currently have any active services?:  No  Do you have any needs or concerns that I can assist you with?:  No  Identified Barriers:  None  Care Transitions Interventions  Other Interventions:          CTC spoke to [facility] for initial transitions call. Pt stated she is doing OK since discharge from [address] [place] [address] [address] for near syncope on . Stated her blood pressure was too high and she was dizzy but did not fall. Denies CP, SOB, dizziness, confusion, nausea. Stated she has not eaten yet this a.m. but will shortly. Advised her to drink plenty of fluids and some protein with breakfast. Pt has family assisting her at home. Stated she has no strength and is working on getting her energy back. Reviewed medications 1111f. Pt stated she does not need Nicoderm patches, has not smoked in @ 30 years. Pt has Cardiologist appt on , declined assistance scheduling PCP visit.  Will continue to follow for transitions    Anabel Aaron RN BSN   Care Transitions Coordinator  [phone]     Follow Up  Future Appointments  Date Time Provider Oscar Barba   2018 11:15 AM MD TORI Cooper KENDALL Aaron RN

## 2018-11-21 NOTE — DISCHARGE SUMMARY
1 p.o. b.i.d.;  Prilosec (omeprazole) 20 mg p.o. daily; Zantac (ranitidine) 150 mg p.o.  nightly; Carafate (sucralfate) 1 gm p.o. 4 times daily p.r.n. GI  distress, 1 hour prior to meals and at bedtime; vitamin D3 at 10,000  units p.o. daily. Note, the patient utilizes a cane for ambulation given her prior stroke. Follow up with the patient's personal caregiver, Princess Calvo, nurse  practitioner, Family Practice, [facility]. Follow up in  the office with [facility], [facility]. [facility] is to be arranging for pain management for this patient. Any aspect of the patient's care not discussed in the chart and/or  dictation will be addressed and treated as an outpatient. The patient's medications have been reviewed including, but not limited  to, pre-hospital, hospital and discharge medications. The patient  and/or the patient's personal representatives were specifically advised  the only medications to be taken are those set forth in the discharge  orders and no other medications should be taken. Any prior medications  not on the discharge orders are specifically discontinued. The patient has been advised of the financial relationship and ownership  interests between [address] [place] [address] physicians and  employees of [employer] and [address] [place] [address].         Keli Ruth    D: 11/20/2018 17:33:04       T: 11/21/2018 1:11:15     /[email or web address]  Job#: [id number]     Doc#: [id number]    CC:

## 2018-11-30 ENCOUNTER — HOSPITAL ENCOUNTER (EMERGENCY)
Age: 58
Discharge: HOME | End: 2018-11-30
Attending: EMERGENCY MEDICINE
Payer: MEDICARE

## 2018-11-30 ENCOUNTER — APPOINTMENT (OUTPATIENT)
Dept: GENERAL RADIOLOGY | Age: 58
End: 2018-11-30
Payer: MEDICARE

## 2018-11-30 VITALS
TEMPERATURE: 98.3 F | SYSTOLIC BLOOD PRESSURE: 148 MMHG | HEART RATE: 90 BPM | RESPIRATION RATE: 16 BRPM | OXYGEN SATURATION: 97 % | DIASTOLIC BLOOD PRESSURE: 77 MMHG

## 2018-11-30 DIAGNOSIS — G89.29 CHRONIC PAIN IN LEFT SHOULDER: Primary | ICD-10-CM

## 2018-11-30 DIAGNOSIS — M25.512 CHRONIC PAIN IN LEFT SHOULDER: Primary | ICD-10-CM

## 2018-11-30 LAB
ABSOLUTE EOS #: 0.2 K/UL (ref 0–0.4)
ABSOLUTE IMMATURE GRANULOCYTE: ABNORMAL K/UL (ref 0–0.3)
ABSOLUTE LYMPH #: 1.5 K/UL (ref 1–4.8)
ABSOLUTE MONO #: 0.6 K/UL (ref 0.1–1.2)
ANION GAP SERPL CALCULATED.3IONS-SCNC: 14 MMOL/L (ref 9–17)
BASOPHILS # BLD: 1 % (ref 0–1)
BASOPHILS ABSOLUTE: 0 K/UL (ref 0–0.2)
BUN BLDV-MCNC: 15 MG/DL (ref 8–23)
BUN/CREAT BLD: 25 (ref 9–20)
CALCIUM SERPL-MCNC: 9.2 MG/DL (ref 8.6–10.4)
CHLORIDE BLD-SCNC: 104 MMOL/L (ref 98–107)
CO2: 22 MMOL/L (ref 20–31)
CREAT SERPL-MCNC: 0.6 MG/DL (ref 0.5–0.9)
DIFFERENTIAL TYPE: ABNORMAL
EKG ATRIAL RATE: 96 BPM
EKG P AXIS: 50 DEGREES
EKG P-R INTERVAL: 140 MS
EKG Q-T INTERVAL: 390 MS
EKG QRS DURATION: 80 MS
EKG QTC CALCULATION (BAZETT): 492 MS
EKG R AXIS: 17 DEGREES
EKG T AXIS: 19 DEGREES
EKG VENTRICULAR RATE: 96 BPM
EOSINOPHILS RELATIVE PERCENT: 3 % (ref 1–7)
GFR AFRICAN AMERICAN: >60 ML/MIN
GFR NON-AFRICAN AMERICAN: >60 ML/MIN
GFR SERPL CREATININE-BSD FRML MDRD: ABNORMAL ML/MIN/{1.73_M2}
GFR SERPL CREATININE-BSD FRML MDRD: ABNORMAL ML/MIN/{1.73_M2}
GLUCOSE BLD-MCNC: 108 MG/DL (ref 70–99)
HCT VFR BLD CALC: 36.3 % (ref 36–46)
HEMOGLOBIN: 11.6 G/DL (ref 12–16)
IMMATURE GRANULOCYTES: ABNORMAL %
LYMPHOCYTES # BLD: 24 % (ref 16–46)
MCH RBC QN AUTO: 26.6 PG (ref 26–34)
MCHC RBC AUTO-ENTMCNC: 32 G/DL (ref 31–37)
MCV RBC AUTO: 83.1 FL (ref 80–100)
MONOCYTES # BLD: 10 % (ref 4–11)
NRBC AUTOMATED: ABNORMAL PER 100 WBC
PDW BLD-RTO: 14.8 % (ref 11–14.5)
PLATELET # BLD: 333 K/UL (ref 140–450)
PLATELET ESTIMATE: ABNORMAL
PMV BLD AUTO: 8.2 FL (ref 6–12)
POTASSIUM SERPL-SCNC: 4.4 MMOL/L (ref 3.7–5.3)
RBC # BLD: 4.37 M/UL (ref 4–5.2)
RBC # BLD: ABNORMAL 10*6/UL
SEG NEUTROPHILS: 62 % (ref 43–77)
SEGMENTED NEUTROPHILS ABSOLUTE COUNT: 3.7 K/UL (ref 1.8–7.7)
SODIUM BLD-SCNC: 140 MMOL/L (ref 135–144)
TROPONIN INTERP: NORMAL
TROPONIN T: <0.03 NG/ML
WBC # BLD: 6.1 K/UL (ref 3.5–11)
WBC # BLD: ABNORMAL 10*3/UL

## 2018-11-30 PROCEDURE — 84484 ASSAY OF TROPONIN QUANT: CPT

## 2018-11-30 PROCEDURE — 36415 COLL VENOUS BLD VENIPUNCTURE: CPT

## 2018-11-30 PROCEDURE — 99283 EMERGENCY DEPT VISIT LOW MDM: CPT

## 2018-11-30 PROCEDURE — 71045 X-RAY EXAM CHEST 1 VIEW: CPT

## 2018-11-30 PROCEDURE — 6360000002 HC RX W HCPCS: Performed by: EMERGENCY MEDICINE

## 2018-11-30 PROCEDURE — 80048 BASIC METABOLIC PNL TOTAL CA: CPT

## 2018-11-30 PROCEDURE — 85025 COMPLETE CBC W/AUTO DIFF WBC: CPT

## 2018-11-30 PROCEDURE — 96372 THER/PROPH/DIAG INJ SC/IM: CPT

## 2018-11-30 PROCEDURE — 93005 ELECTROCARDIOGRAM TRACING: CPT

## 2018-11-30 RX ORDER — KETOROLAC TROMETHAMINE 30 MG/ML
30 INJECTION, SOLUTION INTRAMUSCULAR; INTRAVENOUS ONCE
Status: COMPLETED | OUTPATIENT
Start: 2018-11-30 | End: 2018-11-30

## 2018-11-30 RX ADMIN — KETOROLAC TROMETHAMINE 30 MG: 30 INJECTION, SOLUTION INTRAMUSCULAR at 10:30

## 2018-11-30 ASSESSMENT — PAIN DESCRIPTION - LOCATION
LOCATION: SHOULDER
LOCATION: SHOULDER
LOCATION: ARM

## 2018-11-30 ASSESSMENT — PAIN DESCRIPTION - DESCRIPTORS
DESCRIPTORS: SHARP

## 2018-11-30 ASSESSMENT — PAIN DESCRIPTION - FREQUENCY
FREQUENCY: CONTINUOUS
FREQUENCY: INTERMITTENT
FREQUENCY: CONTINUOUS

## 2018-11-30 ASSESSMENT — PAIN DESCRIPTION - PROGRESSION
CLINICAL_PROGRESSION: GRADUALLY IMPROVING
CLINICAL_PROGRESSION: GRADUALLY IMPROVING
CLINICAL_PROGRESSION: GRADUALLY WORSENING

## 2018-11-30 ASSESSMENT — PAIN DESCRIPTION - PAIN TYPE
TYPE: CHRONIC PAIN

## 2018-11-30 ASSESSMENT — PAIN DESCRIPTION - DIRECTION: RADIATING_TOWARDS: CHEST/NECK

## 2018-11-30 ASSESSMENT — PAIN DESCRIPTION - ORIENTATION
ORIENTATION: LEFT

## 2018-11-30 ASSESSMENT — PAIN SCALES - GENERAL
PAINLEVEL_OUTOF10: 4
PAINLEVEL_OUTOF10: 8
PAINLEVEL_OUTOF10: 4

## 2018-11-30 ASSESSMENT — PAIN DESCRIPTION - ONSET
ONSET: ON-GOING

## 2018-11-30 NOTE — ED PROVIDER NOTES
MEDICAL HISTORY    has a past medical history of Anxiety; Asthma; Cerebral artery occlusion with cerebral infarction ([facility] [address] Cerebrovascular disease; Depression; Diverticulitis; Fibromyalgia; History of small bowel obstruction; Hypertension; Lupus; and Seizures ([facility] [address] SURGICAL HISTORY      has a past surgical history that includes Appendectomy;  section; Hysterectomy (); Dilation and curettage of uterus; Tubal ligation; Colonoscopy (); Colonoscopy (2015); Tooth Extraction (3/1/16); other surgical history (Left, 2016); and pr egd transoral biopsy single/multiple (N/A, 2017). CURRENT MEDICATIONS       Previous Medications    CHOLECALCIFEROL (VITAMIN D3) 42186 UNITS CAPS    Take 1 capsule by mouth daily    CYCLOBENZAPRINE (FLEXERIL) 10 MG TABLET    Take 1 tablet by mouth 3 times daily as needed for Muscle spasms    DICLOFENAC SODIUM 1 % GEL    Apply 2 g topically 4 times daily    DICYCLOMINE (BENTYL) 10 MG CAPSULE    Take 1 capsule by mouth 4 times daily as needed (Cramps/Abdominal Pain)    DOCUSATE SODIUM (COLACE) 100 MG CAPSULE    Take 1 capsule by mouth 2 times daily    HANDICAP PLACARD MISC    by Does not apply route Issue parking placard for persons with disabilities. [facility] sec 4503.44. Length of time expected to have disablilty:36 months. Applicant meets the qualifying disability criteria. LISINOPRIL (PRINIVIL;ZESTRIL) 10 MG TABLET    Take 1 tablet by mouth daily    MISC.  DEVICES (CANE) MISC    1 Units by Does not apply route as needed    MULTIPLE VITAMINS-MINERALS (MULTI-VITAMIN/MINERALS PO)    Take 1 tablet by mouth 2 times daily    NICOTINE (NICODERM CQ) 21 MG/24HR    Place 1 patch onto the skin every 24 hours    OMEPRAZOLE (PRILOSEC) 20 MG DELAYED RELEASE CAPSULE    Take 1 capsule by mouth daily    RANITIDINE (ZANTAC) 150 MG TABLET    Take 1 tablet by mouth nightly    SUCRALFATE (CARAFATE) 1 GM TABLET    Take 1 tablet by mouth 4 times daily as needed (abdominal pain) Take 1

## 2019-02-15 RX ORDER — DOCUSATE SODIUM 100 MG/1
CAPSULE, LIQUID FILLED ORAL
Qty: 60 CAPSULE | Refills: 5 | OUTPATIENT
Start: 2019-02-15

## 2019-02-15 RX ORDER — CHOLECALCIFEROL (VITAMIN D3) 125 MCG
CAPSULE ORAL
Qty: 60 CAPSULE | Refills: 2 | OUTPATIENT
Start: 2019-02-15

## 2019-02-15 RX ORDER — CYCLOBENZAPRINE HCL 10 MG
TABLET ORAL
Qty: 90 TABLET | Refills: 11 | OUTPATIENT
Start: 2019-02-15

## 2019-03-26 ENCOUNTER — HOSPITAL ENCOUNTER (EMERGENCY)
Age: 59
Discharge: HOME | End: 2019-03-26
Attending: EMERGENCY MEDICINE
Payer: MEDICARE

## 2019-03-26 VITALS
TEMPERATURE: 97.9 F | SYSTOLIC BLOOD PRESSURE: 191 MMHG | BODY MASS INDEX: 31.09 KG/M2 | HEIGHT: 62 IN | DIASTOLIC BLOOD PRESSURE: 101 MMHG | OXYGEN SATURATION: 98 % | HEART RATE: 103 BPM | RESPIRATION RATE: 17 BRPM

## 2019-03-26 DIAGNOSIS — S47.2XXA CRUSH INJURY ARM, LEFT, INITIAL ENCOUNTER: ICD-10-CM
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
## 2019-03-26 PROCEDURE — 99283 EMERGENCY DEPT VISIT LOW MDM: CPT

## 2019-03-26 PROCEDURE — 6360000002 HC RX W HCPCS: Performed by: EMERGENCY MEDICINE

## 2019-03-26 PROCEDURE — 96372 THER/PROPH/DIAG INJ SC/IM: CPT

## 2019-03-26 RX ORDER — KETOROLAC TROMETHAMINE 30 MG/ML
30 INJECTION, SOLUTION INTRAMUSCULAR; INTRAVENOUS ONCE
Status: COMPLETED | OUTPATIENT
Start: 2019-03-26 | End: 2019-03-26

## 2019-03-26 RX ORDER — IBUPROFEN 800 MG/1
800 TABLET ORAL EVERY 8 HOURS PRN
Qty: 30 TABLET | Refills: 0 | Status: SHIPPED | OUTPATIENT
Start: 2019-03-26 | End: 2019-03-26

## 2019-03-26 RX ADMIN — KETOROLAC TROMETHAMINE 30 MG: 30 INJECTION, SOLUTION INTRAMUSCULAR; INTRAVENOUS at 21:01

## 2019-03-26 ASSESSMENT — PAIN DESCRIPTION - PAIN TYPE: TYPE: ACUTE PAIN

## 2019-03-26 ASSESSMENT — PAIN DESCRIPTION - ORIENTATION: ORIENTATION: LEFT;UPPER

## 2019-03-26 ASSESSMENT — PAIN SCALES - GENERAL
PAINLEVEL_OUTOF10: 9
PAINLEVEL_OUTOF10: 9

## 2019-03-26 ASSESSMENT — PAIN DESCRIPTION - LOCATION: LOCATION: ARM

## 2019-03-26 ASSESSMENT — PAIN DESCRIPTION - DESCRIPTORS: DESCRIPTORS: ACHING

## 2019-03-27 ASSESSMENT — ENCOUNTER SYMPTOMS
COUGH: 0
SHORTNESS OF BREATH: 0
RHINORRHEA: 0
VOMITING: 0
ABDOMINAL PAIN: 0
EYE PAIN: 0
DIARRHEA: 0
SORE THROAT: 0
NAUSEA: 0
BACK PAIN: 0

## 2019-05-09 ENCOUNTER — HOSPITAL ENCOUNTER (EMERGENCY)
Age: 59
Discharge: HOME | End: 2019-05-09
Attending: EMERGENCY MEDICINE
Payer: OTHER MISCELLANEOUS

## 2019-05-09 ENCOUNTER — APPOINTMENT (OUTPATIENT)
Dept: GENERAL RADIOLOGY | Age: 59
End: 2019-05-09
Payer: OTHER MISCELLANEOUS

## 2019-05-09 ENCOUNTER — APPOINTMENT (OUTPATIENT)
Dept: CT IMAGING | Age: 59
End: 2019-05-09
Payer: OTHER MISCELLANEOUS

## 2019-05-09 VITALS
DIASTOLIC BLOOD PRESSURE: 72 MMHG | OXYGEN SATURATION: 98 % | HEART RATE: 88 BPM | RESPIRATION RATE: 14 BRPM | TEMPERATURE: 98.4 F | BODY MASS INDEX: 32.39 KG/M2 | HEIGHT: 60 IN | WEIGHT: 165 LBS | SYSTOLIC BLOOD PRESSURE: 155 MMHG
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
## 2019-05-09 DIAGNOSIS — S16.1XXA ACUTE STRAIN OF NECK MUSCLE, INITIAL ENCOUNTER: Primary | ICD-10-CM

## 2019-05-09 PROCEDURE — 72072 X-RAY EXAM THORAC SPINE 3VWS: CPT

## 2019-05-09 PROCEDURE — 72125 CT NECK SPINE W/O DYE: CPT

## 2019-05-09 PROCEDURE — 72100 X-RAY EXAM L-S SPINE 2/3 VWS: CPT

## 2019-05-09 PROCEDURE — 6360000002 HC RX W HCPCS: Performed by: EMERGENCY MEDICINE

## 2019-05-09 PROCEDURE — 99284 EMERGENCY DEPT VISIT MOD MDM: CPT

## 2019-05-09 PROCEDURE — 96372 THER/PROPH/DIAG INJ SC/IM: CPT

## 2019-05-09 RX ORDER — KETOROLAC TROMETHAMINE 30 MG/ML
30 INJECTION, SOLUTION INTRAMUSCULAR; INTRAVENOUS ONCE
Status: COMPLETED | OUTPATIENT
Start: 2019-05-09 | End: 2019-05-09

## 2019-05-09 RX ADMIN — KETOROLAC TROMETHAMINE 30 MG: 30 INJECTION, SOLUTION INTRAMUSCULAR at 17:27

## 2019-05-09 ASSESSMENT — PAIN DESCRIPTION - ORIENTATION: ORIENTATION: LEFT

## 2019-05-09 ASSESSMENT — PAIN DESCRIPTION - FREQUENCY: FREQUENCY: CONTINUOUS

## 2019-05-09 ASSESSMENT — PAIN SCALES - GENERAL
PAINLEVEL_OUTOF10: 7
PAINLEVEL_OUTOF10: 8
PAINLEVEL_OUTOF10: 10
PAINLEVEL_OUTOF10: 8

## 2019-05-09 ASSESSMENT — PAIN DESCRIPTION - ONSET: ONSET: ON-GOING

## 2019-05-09 ASSESSMENT — PAIN DESCRIPTION - DESCRIPTORS: DESCRIPTORS: SHARP

## 2019-05-09 ASSESSMENT — PAIN - FUNCTIONAL ASSESSMENT: PAIN_FUNCTIONAL_ASSESSMENT: 0-10

## 2019-05-09 ASSESSMENT — PAIN DESCRIPTION - PROGRESSION: CLINICAL_PROGRESSION: GRADUALLY IMPROVING

## 2019-05-09 NOTE — ED NOTES
Patient crying out in pain states I will leave if I dont get help soon or I will take a tylenol of my own.      Josias Marion, RN  05/09/19 1788
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
## 2019-05-14 ENCOUNTER — HOSPITAL ENCOUNTER (OUTPATIENT)
Age: 59
Setting detail: SPECIMEN
Discharge: HOME | End: 2019-05-14
Payer: MEDICARE

## 2019-05-14 LAB
-: ABNORMAL
AMORPHOUS: ABNORMAL
BACTERIA: ABNORMAL
BILIRUBIN URINE: NEGATIVE
CASTS UA: ABNORMAL /LPF (ref 0–2)
COLOR: YELLOW
COMMENT UA: ABNORMAL
CRYSTALS, UA: ABNORMAL /HPF
EPITHELIAL CELLS UA: ABNORMAL /HPF (ref 0–5)
GLUCOSE URINE: NEGATIVE
KETONES, URINE: NEGATIVE
LEUKOCYTE ESTERASE, URINE: ABNORMAL
MUCUS: ABNORMAL
NITRITE, URINE: NEGATIVE
OTHER OBSERVATIONS UA: ABNORMAL
PH UA: 5.5 (ref 5–8)
PROTEIN UA: NEGATIVE
RBC UA: ABNORMAL /HPF (ref 0–2)
RENAL EPITHELIAL, UA: ABNORMAL /HPF
SPECIFIC GRAVITY UA: 1.02 (ref 1–1.03)
TRICHOMONAS: ABNORMAL
TURBIDITY: ABNORMAL
URINE HGB: NEGATIVE
UROBILINOGEN, URINE: NORMAL
WBC UA: ABNORMAL /HPF (ref 0–5)
YEAST: ABNORMAL

## 2019-05-16 ENCOUNTER — HOSPITAL ENCOUNTER (OUTPATIENT)
Dept: LAB | Age: 59
Discharge: HOME | End: 2019-05-16
Payer: MEDICARE

## 2019-05-16 LAB
BUN BLDV-MCNC: 14 MG/DL (ref 8–23)
CREAT SERPL-MCNC: 0.68 MG/DL (ref 0.5–0.9)
CULTURE: ABNORMAL
GFR AFRICAN AMERICAN: >60 ML/MIN
GFR NON-AFRICAN AMERICAN: >60 ML/MIN
GFR SERPL CREATININE-BSD FRML MDRD: NORMAL ML/MIN/{1.73_M2}
GFR SERPL CREATININE-BSD FRML MDRD: NORMAL ML/MIN/{1.73_M2}
Lab: ABNORMAL
SPECIMEN DESCRIPTION: ABNORMAL

## 2019-05-16 PROCEDURE — 82565 ASSAY OF CREATININE: CPT

## 2019-05-16 PROCEDURE — 84520 ASSAY OF UREA NITROGEN: CPT

## 2019-05-16 PROCEDURE — 36415 COLL VENOUS BLD VENIPUNCTURE: CPT

## 2019-05-17 ENCOUNTER — HOSPITAL ENCOUNTER (OUTPATIENT)
Dept: CT IMAGING | Age: 59
Discharge: HOME | End: 2019-05-19
Payer: MEDICARE

## 2019-05-17 DIAGNOSIS — R10.9 ABDOMINAL PAIN, UNSPECIFIED ABDOMINAL LOCATION: ICD-10-CM

## 2019-05-17 PROCEDURE — 6360000004 HC RX CONTRAST MEDICATION: Performed by: NURSE PRACTITIONER

## 2019-05-17 PROCEDURE — 74177 CT ABD & PELVIS W/CONTRAST: CPT

## 2019-05-17 RX ADMIN — IOPAMIDOL 100 ML: 755 INJECTION, SOLUTION INTRAVENOUS at 14:13

## 2019-05-24 ENCOUNTER — HOSPITAL ENCOUNTER (OUTPATIENT)
Dept: PHYSICAL THERAPY | Age: 59
Setting detail: THERAPIES SERIES
Discharge: HOME | End: 2019-05-24
Payer: OTHER MISCELLANEOUS

## 2019-05-24 PROCEDURE — 97161 PT EVAL LOW COMPLEX 20 MIN: CPT | Performed by: PHYSICAL THERAPIST

## 2019-05-24 PROCEDURE — 97110 THERAPEUTIC EXERCISES: CPT | Performed by: PHYSICAL THERAPIST

## 2019-05-24 ASSESSMENT — PAIN DESCRIPTION - PAIN TYPE: TYPE: ACUTE PAIN

## 2019-05-24 ASSESSMENT — PAIN DESCRIPTION - LOCATION: LOCATION: NECK;SHOULDER

## 2019-05-24 ASSESSMENT — PAIN DESCRIPTION - ONSET: ONSET: SUDDEN

## 2019-05-24 ASSESSMENT — PAIN - FUNCTIONAL ASSESSMENT: PAIN_FUNCTIONAL_ASSESSMENT: PREVENTS OR INTERFERES SOME ACTIVE ACTIVITIES AND ADLS

## 2019-05-24 ASSESSMENT — PAIN SCALES - GENERAL: PAINLEVEL_OUTOF10: 6

## 2019-05-24 ASSESSMENT — PAIN DESCRIPTION - ORIENTATION: ORIENTATION: RIGHT

## 2019-05-24 ASSESSMENT — PAIN DESCRIPTION - DESCRIPTORS: DESCRIPTORS: ACHING;SHOOTING

## 2019-05-24 ASSESSMENT — PAIN DESCRIPTION - PROGRESSION: CLINICAL_PROGRESSION: GRADUALLY WORSENING

## 2019-05-24 ASSESSMENT — PAIN DESCRIPTION - FREQUENCY: FREQUENCY: INTERMITTENT
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
## 2019-06-07 ENCOUNTER — HOSPITAL ENCOUNTER (OUTPATIENT)
Dept: PHYSICAL THERAPY | Age: 59
Setting detail: THERAPIES SERIES
Discharge: HOME | End: 2019-06-07
Payer: OTHER MISCELLANEOUS

## 2019-06-07 PROCEDURE — 97110 THERAPEUTIC EXERCISES: CPT | Performed by: PHYSICAL THERAPY ASSISTANT

## 2019-06-07 NOTE — PROGRESS NOTES
I have reviewed and agree to the content of the note written by the PTA.   Electronically signed by Titus Mancia PT [id number]

## 2019-06-07 NOTE — FLOWSHEET NOTE
activities, direct (one-on-one) patient contact (use of dynamic activities to improve functional performance). (06298)    Gait:   [] Provided training and instruction to the patient for ambulation re-education. (16159)    Self-Care/ADL's  [] Self-care/home management training and compensatory training, meal preparation, safety procedures, and instructions in use of assistive technology devices/adaptive equipment, direct one-on-one contact. (58475)    Home Exercise Program:    C-spine retraction/ extension/ L side bend  [x] Reviewed/Progressed HEP activities related to strengthening, flexibility, endurance, ROM. (63133)  [] Reviewed/Progressed HEP activities related to improving balance, coordination, kinesthetic sense, posture, motor skill, proprioception.  (80457)    Manual Treatments:    [] Provided manual therapy to mobilize soft tissue/joints for the purpose of modulating pain, promoting relaxation,  increasing ROM, reducing/eliminating soft tissue swelling/inflammation/restriction, improving soft tissue extensibility.  (17691)    Service Based Modalities:      Timed Code Treatment Minutes:    24' FELIX    Total Treatment Minutes:  24'     Treatment/Activity Tolerance:  [] Patient tolerated treatment well [] Patient limited by fatique  [x] Patient limited by pain  [] Patient limited by other medical complications  [] Other:     Prognosis: [] Good [x] Fair  [] Poor    Patient Requires Follow-up: [x] Yes  [] No      Goals:  Short term goals  Time Frame for Short term goals: 1 week  Short term goal 1: Revise HEP for C-spine    Long term goals  Time Frame for Long term goals : 4 weeks  Long term goal 1: Neck pain 3/10  Long term goal 2: AROM rotation 60-70 deg for safe driving  Long term goal 3: Reduce L UE pain by 50%      Plan:   [x] Continue per plan of care [] Alter current plan (see comments)  [] Plan of care initiated [] Hold pending MD visit [] Discharge    Plan for Next Session:  Discuss plan with PT and monitor tolerance. Electronically signed by:   Salma Garvin

## 2019-06-11 ENCOUNTER — HOSPITAL ENCOUNTER (OUTPATIENT)
Dept: PHYSICAL THERAPY | Age: 59
Setting detail: THERAPIES SERIES
Discharge: HOME | End: 2019-06-11
Payer: OTHER MISCELLANEOUS

## 2019-06-11 ENCOUNTER — OFFICE VISIT (OUTPATIENT)
Dept: PRIMARY CARE CLINIC | Age: 59
End: 2019-06-11

## 2019-06-11 VITALS
OXYGEN SATURATION: 98 % | WEIGHT: 168 LBS | HEIGHT: 60 IN | HEART RATE: 94 BPM | RESPIRATION RATE: 16 BRPM | SYSTOLIC BLOOD PRESSURE: 130 MMHG | DIASTOLIC BLOOD PRESSURE: 90 MMHG | BODY MASS INDEX: 32.98 KG/M2 | TEMPERATURE: 98.2 F

## 2019-06-11 DIAGNOSIS — R03.0 ELEVATED BLOOD PRESSURE READING: Primary | ICD-10-CM

## 2019-06-11 NOTE — PROGRESS NOTES
Physical Therapy    Outpatient Physical Therapy    [x] [place]  Phone: [phone]  Fax: [phone]      [] [place]  Phone: [phone]  Fax: [phone]    Physical Therapy  Cancellation/No-show Note  Patient Name:  Iftikhar Ramirez  :  1960   Date:  2019  Cancelled visits to date: 1 (Provider; see below)  No-shows to date: 0    For today's appointment patient:  [x]  Cancelled  []  Rescheduled appointment  []  No-show     Reason given by patient:  []  Patient ill  []  Conflicting appointment  []  No transportation    []  Conflict with work  []  No reason given  [x]  Other:     Comments:  Patient arrived for therapy this date with c/o lightheadedness, heart racing; questions if BP is high. /90, SPO2: 98%,  BPM.  Due to c/o determined need to cancel visit and patient escorted to Urgent Care. Electronically signed by:  Janie Zhang PTA
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
## 2019-11-12 ENCOUNTER — APPOINTMENT (OUTPATIENT)
Dept: GENERAL RADIOLOGY | Age: 59
End: 2019-11-12
Payer: MEDICARE

## 2019-11-12 ENCOUNTER — APPOINTMENT (OUTPATIENT)
Dept: CT IMAGING | Age: 59
End: 2019-11-12
Payer: MEDICARE

## 2019-11-12 ENCOUNTER — HOSPITAL ENCOUNTER (EMERGENCY)
Age: 59
Discharge: HOME | End: 2019-11-12
Attending: EMERGENCY MEDICINE
Payer: MEDICARE

## 2019-11-12 VITALS
WEIGHT: 165 LBS | BODY MASS INDEX: 32.39 KG/M2 | HEART RATE: 88 BPM | DIASTOLIC BLOOD PRESSURE: 74 MMHG | OXYGEN SATURATION: 97 % | RESPIRATION RATE: 16 BRPM | TEMPERATURE: 97.5 F | SYSTOLIC BLOOD PRESSURE: 141 MMHG | HEIGHT: 60 IN

## 2019-11-12 DIAGNOSIS — M79.602 LEFT ARM PAIN: Primary | ICD-10-CM

## 2019-11-12 DIAGNOSIS — R42 DIZZINESS: ICD-10-CM

## 2019-11-12 LAB
ABSOLUTE EOS #: 0.3 K/UL (ref 0–0.4)
ABSOLUTE IMMATURE GRANULOCYTE: NORMAL K/UL (ref 0–0.3)
ABSOLUTE LYMPH #: 1.6 K/UL (ref 1–4.8)
ABSOLUTE MONO #: 0.7 K/UL (ref 0.1–1.2)
ALBUMIN SERPL-MCNC: 4.2 G/DL (ref 3.5–5.2)
ALBUMIN/GLOBULIN RATIO: 1.1 (ref 1–2.5)
ALP BLD-CCNC: 67 U/L (ref 35–104)
ALT SERPL-CCNC: 8 U/L (ref 5–33)
ANION GAP SERPL CALCULATED.3IONS-SCNC: 14 MMOL/L (ref 9–17)
AST SERPL-CCNC: 16 U/L
BASOPHILS # BLD: 1 % (ref 0–1)
BASOPHILS ABSOLUTE: 0 K/UL (ref 0–0.2)
BILIRUB SERPL-MCNC: 0.18 MG/DL (ref 0.3–1.2)
BUN BLDV-MCNC: 8 MG/DL (ref 8–23)
BUN/CREAT BLD: 10 (ref 9–20)
CALCIUM SERPL-MCNC: 9.2 MG/DL (ref 8.6–10.4)
CHLORIDE BLD-SCNC: 102 MMOL/L (ref 98–107)
CO2: 24 MMOL/L (ref 20–31)
CREAT SERPL-MCNC: 0.8 MG/DL (ref 0.5–0.9)
D DIMER: 120 NG/ML
DIFFERENTIAL TYPE: NORMAL
EOSINOPHILS RELATIVE PERCENT: 4 % (ref 1–7)
GFR AFRICAN AMERICAN: >60 ML/MIN
GFR NON-AFRICAN AMERICAN: >60 ML/MIN
GFR SERPL CREATININE-BSD FRML MDRD: ABNORMAL ML/MIN/{1.73_M2}
GFR SERPL CREATININE-BSD FRML MDRD: ABNORMAL ML/MIN/{1.73_M2}
GLUCOSE BLD-MCNC: 83 MG/DL (ref 70–99)
HCT VFR BLD CALC: 36.8 % (ref 36–46)
HEMOGLOBIN: 12.1 G/DL (ref 12–16)
IMMATURE GRANULOCYTES: NORMAL %
INR BLD: 1
LIPASE: 27 U/L (ref 13–60)
LYMPHOCYTES # BLD: 23 % (ref 16–46)
MCH RBC QN AUTO: 28.1 PG (ref 26–34)
MCHC RBC AUTO-ENTMCNC: 32.8 G/DL (ref 31–37)
MCV RBC AUTO: 85.6 FL (ref 80–100)
MONOCYTES # BLD: 10 % (ref 4–11)
MYOGLOBIN: 22 NG/ML (ref 25–58)
NRBC AUTOMATED: NORMAL PER 100 WBC
PDW BLD-RTO: 14.2 % (ref 11–14.5)
PLATELET # BLD: 401 K/UL (ref 140–450)
PLATELET ESTIMATE: NORMAL
PMV BLD AUTO: 7.9 FL (ref 6–12)
POTASSIUM SERPL-SCNC: 3.8 MMOL/L (ref 3.7–5.3)
PROTHROMBIN TIME: 10.1 SEC (ref 9.4–11.3)
RBC # BLD: 4.3 M/UL (ref 4–5.2)
RBC # BLD: NORMAL 10*6/UL
SEG NEUTROPHILS: 62 % (ref 43–77)
SEGMENTED NEUTROPHILS ABSOLUTE COUNT: 4.5 K/UL (ref 1.8–7.7)
SODIUM BLD-SCNC: 140 MMOL/L (ref 135–144)
TOTAL PROTEIN: 7.9 G/DL (ref 6.4–8.3)
TROPONIN INTERP: NORMAL
TROPONIN INTERP: NORMAL
TROPONIN T: NORMAL NG/ML
TROPONIN T: NORMAL NG/ML
TROPONIN, HIGH SENSITIVITY: <6 NG/L (ref 0–14)
TROPONIN, HIGH SENSITIVITY: <6 NG/L (ref 0–14)
WBC # BLD: 7.1 K/UL (ref 3.5–11)
WBC # BLD: NORMAL 10*3/UL

## 2019-11-12 PROCEDURE — 85379 FIBRIN DEGRADATION QUANT: CPT

## 2019-11-12 PROCEDURE — 84484 ASSAY OF TROPONIN QUANT: CPT

## 2019-11-12 PROCEDURE — 93005 ELECTROCARDIOGRAM TRACING: CPT | Performed by: EMERGENCY MEDICINE

## 2019-11-12 PROCEDURE — 71046 X-RAY EXAM CHEST 2 VIEWS: CPT

## 2019-11-12 PROCEDURE — 99285 EMERGENCY DEPT VISIT HI MDM: CPT

## 2019-11-12 PROCEDURE — 85025 COMPLETE CBC W/AUTO DIFF WBC: CPT

## 2019-11-12 PROCEDURE — 80053 COMPREHEN METABOLIC PANEL: CPT

## 2019-11-12 PROCEDURE — 85610 PROTHROMBIN TIME: CPT

## 2019-11-12 PROCEDURE — 36415 COLL VENOUS BLD VENIPUNCTURE: CPT

## 2019-11-12 PROCEDURE — 83690 ASSAY OF LIPASE: CPT

## 2019-11-12 PROCEDURE — 83874 ASSAY OF MYOGLOBIN: CPT

## 2019-11-12 ASSESSMENT — PAIN SCALES - GENERAL: PAINLEVEL_OUTOF10: 7

## 2019-11-12 ASSESSMENT — ENCOUNTER SYMPTOMS
SHORTNESS OF BREATH: 0
EYE PAIN: 0
ABDOMINAL PAIN: 0
NAUSEA: 0
COUGH: 0
VOMITING: 0
BACK PAIN: 0
DIARRHEA: 0

## 2019-11-12 ASSESSMENT — PAIN DESCRIPTION - ORIENTATION: ORIENTATION: LEFT

## 2019-11-12 ASSESSMENT — PAIN DESCRIPTION - LOCATION: LOCATION: ARM

## 2019-11-12 ASSESSMENT — PAIN DESCRIPTION - PAIN TYPE: TYPE: ACUTE PAIN

## 2019-11-12 ASSESSMENT — PAIN DESCRIPTION - FREQUENCY: FREQUENCY: INTERMITTENT

## 2019-11-12 ASSESSMENT — PAIN DESCRIPTION - PROGRESSION: CLINICAL_PROGRESSION: GRADUALLY WORSENING

## 2019-11-13 LAB
EKG ATRIAL RATE: 110 BPM
EKG ATRIAL RATE: 96 BPM
EKG P AXIS: 46 DEGREES
EKG P AXIS: 46 DEGREES
EKG P-R INTERVAL: 142 MS
EKG P-R INTERVAL: 150 MS
EKG Q-T INTERVAL: 362 MS
EKG Q-T INTERVAL: 390 MS
EKG QRS DURATION: 78 MS
EKG QRS DURATION: 80 MS
EKG QTC CALCULATION (BAZETT): 489 MS
EKG QTC CALCULATION (BAZETT): 492 MS
EKG R AXIS: 14 DEGREES
EKG R AXIS: 27 DEGREES
EKG T AXIS: 33 DEGREES
EKG T AXIS: 46 DEGREES
EKG VENTRICULAR RATE: 110 BPM
EKG VENTRICULAR RATE: 96 BPM

## 2020-03-30 ENCOUNTER — TELEPHONE (OUTPATIENT)
Dept: SURGERY | Age: 60
End: 2020-03-30

## 2020-03-30 NOTE — TELEPHONE ENCOUNTER
Received referral to consult with General Surgeons for diverticulosis from Gene Lawton CNP, [facility]. Message was left to contact our office on 3/18/2020. After no response a letter was mailed to patient to call our office at [phone].

## 2020-09-16 ENCOUNTER — OFFICE VISIT (OUTPATIENT)
Dept: FAMILY MEDICINE CLINIC | Age: 60
End: 2020-09-16
Payer: MEDICARE

## 2020-09-16 VITALS
DIASTOLIC BLOOD PRESSURE: 84 MMHG | TEMPERATURE: 96.5 F | BODY MASS INDEX: 32.13 KG/M2 | WEIGHT: 174.6 LBS | SYSTOLIC BLOOD PRESSURE: 132 MMHG | HEIGHT: 62 IN | HEART RATE: 89 BPM | OXYGEN SATURATION: 98 %

## 2020-09-16 PROBLEM — H04.123 DRY EYE SYNDROME OF BOTH LACRIMAL GLANDS: Status: ACTIVE | Noted: 2020-05-12

## 2020-09-16 PROBLEM — K57.32 DIVERTICULITIS OF COLON: Status: ACTIVE | Noted: 2019-05-14

## 2020-09-16 PROBLEM — I10 BENIGN ESSENTIAL HYPERTENSION: Status: ACTIVE | Noted: 2019-06-07

## 2020-09-16 PROBLEM — H16.9 KERATITIS: Status: ACTIVE | Noted: 2020-05-12

## 2020-09-16 PROBLEM — G43.109 OCULAR MIGRAINE: Status: ACTIVE | Noted: 2017-04-24

## 2020-09-16 PROBLEM — M16.9 OSTEOARTHRITIS OF HIP, UNSPECIFIED: Status: ACTIVE | Noted: 2018-10-15

## 2020-09-16 PROBLEM — F43.22 ADJUSTMENT DISORDER WITH ANXIOUS MOOD: Status: ACTIVE | Noted: 2019-02-14

## 2020-09-16 PROBLEM — M25.60 JOINT STIFFNESS: Status: ACTIVE | Noted: 2019-02-14

## 2020-09-16 PROBLEM — M25.50 PAIN IN JOINT: Status: ACTIVE | Noted: 2019-02-14

## 2020-09-16 PROBLEM — D72.829 LEUKOCYTOSIS: Status: ACTIVE | Noted: 2019-05-14

## 2020-09-16 PROBLEM — H40.013 OPEN ANGLE WITH BORDERLINE FINDINGS AND LOW GLAUCOMA RISK IN BOTH EYES: Status: ACTIVE | Noted: 2017-04-24

## 2020-09-16 PROBLEM — F51.01 PRIMARY INSOMNIA: Status: ACTIVE | Noted: 2019-02-14

## 2020-09-16 PROCEDURE — 99214 OFFICE O/P EST MOD 30 MIN: CPT | Performed by: FAMILY MEDICINE

## 2020-09-16 PROCEDURE — 1036F TOBACCO NON-USER: CPT | Performed by: FAMILY MEDICINE

## 2020-09-16 PROCEDURE — G8427 DOCREV CUR MEDS BY ELIG CLIN: HCPCS | Performed by: FAMILY MEDICINE

## 2020-09-16 PROCEDURE — 99212 OFFICE O/P EST SF 10 MIN: CPT

## 2020-09-16 PROCEDURE — G8417 CALC BMI ABV UP PARAM F/U: HCPCS | Performed by: FAMILY MEDICINE

## 2020-09-16 PROCEDURE — 3017F COLORECTAL CA SCREEN DOC REV: CPT | Performed by: FAMILY MEDICINE

## 2020-09-16 ASSESSMENT — ENCOUNTER SYMPTOMS
ABDOMINAL PAIN: 1
DIARRHEA: 1
NAUSEA: 1
VOMITING: 1

## 2020-09-16 NOTE — PROGRESS NOTES
[facility]  [address], [address]  [phone]      Collette Greening is a 56 y.o. female who presents today for her medical conditions/complaints as noted below. Collette Greening is c/o of Established New Doctor and Abdominal Pain (wants colonoscopy- )      HPI:     Pt here today to re-establish - last seen on 11/27/17. Had had a health insurance switch, so she was going to [address] in the interim to see Kayce Greco. Pt has been having lower abdominal cramping for over a year - intermittent. Has diarrhea with this. Seems to be worse with eating red meat or dairy, but sometimes triggers are random. Has a whole \"spell\" sometimes - can have blood in the stool, sweating, dizziness, etc.  Sometimes improved by having a BM; uses Miralax as needed. No frequent constipation; drinking water frequently. Taking Bentyl 10 mg as needed - maybe one every other day or as needed before she eats out. This does seem to help. Wondering about getting a colonoscopy to evaluate this - last one was 1/2015, which showed only mild colitis (Dr. Marbin Espino). Taking Omeprazole 20 mg daily for GERD/LUQ pain - stable on this. Using Colace 100 mg daily for bowels - stable. Pt taking Lisinopril 10 mg daily for HTN.  BP well-controlled today - 132/84.  Checks BP at home as needed when she feels bad - can be high when she's having one of her \"spells\" (180/90's), but otherwise, it is usually 120/60-70's. No longer seeing Pain Mgmt here - was fired after she did not come in for a pill count when she was visiting her daughter in [place]; had also had a urine specimen that showed no evidence of her pain medication. Still taking Flexeril 10 mg only as needed with a \"Tylenol Muscle\" - takes the Flexeril only sparingly, as it makes her so tired. Does not take it every day, maybe twice per week.       Using Diclofenac gel as needed on her neck and hands nightly - working well. Also found a lotion/cream with CBD oil in it, which has also worked well for her. Wears a copper brace/bandage that she wears on her arms/legs as needed. Pt has chronic anemia - last Hgb 12.1 on 19; not currently taking anything for this. Taking Vit D3 5000 IU daily, along with Magnesium gummy (2) daily, MVI, eye vitamin, and Hair, Skin & Nail supplement daily. Seeing Dr. Burris Citizen for cataract in both eyes once yearly; not bad enough to have surgery to remove yet.             Past Medical History:   Diagnosis Date    Anxiety     Asthma     Cerebral artery occlusion with cerebral infarction [facility]) 2009    Cerebrovascular disease     stroke     Depression     Diverticulitis     Fibromyalgia     History of small bowel obstruction     Hypertension     Lupus ([facility] [address]     Seizures ([facility] [address]  and       Past Surgical History:   Procedure Laterality Date    APPENDECTOMY       SECTION      x 4    COLONOSCOPY      nl    COLONOSCOPY  2015    1 bx    DILATION AND CURETTAGE OF UTERUS      HYSTERECTOMY  2009    TAHBSO due to fibroids MMR    OTHER SURGICAL HISTORY Left 2016    C6 TFE    [place] E[place] TRANSORAL BIOPSY SINGLE/MULTIPLE N/A 2017    EGD BIOPSY performed by Melecio Lehman MD at [facility]  3/1/16    16 removed on bottom 1 on top     TUBAL LIGATION       Family History   Problem Relation Age of Onset    Diabetes Mother         Passed due to DM at 62    Other Mother         enlarged heart    Dementia Father     Other Father         Brain tumor     Diabetes Maternal Aunt     Diabetes Maternal Uncle     Glaucoma Neg Hx      Social History     Tobacco Use    Smoking status: Former Smoker     Last attempt to quit: 1993     Years since quittin.4    Smokeless tobacco: Never Used    Tobacco comment: alecia rrt 3/12/17   Substance Use Topics    Alcohol use:  Yes     Alcohol/week: 0.0 standard drinks     Comment: has about 1 or 2 a year. very rarely      Current Outpatient Medications   Medication Sig Dispense Refill    MAGNESIUM PO Take by mouth      Handicap Placard [facility] by Does not apply route Issue parking placard for persons with disabilities. [facility] sec 4503.44. Expires in 5 years. Applicant meets the qualifying disability criteria. 1 each 0    diclofenac sodium 1 % GEL Apply 2 g topically 4 times daily 3 Tube 3    dicyclomine (BENTYL) 10 MG capsule Take 1 capsule by mouth 4 times daily as needed (Cramps/Abdominal Pain) 120 capsule 3    lisinopril (PRINIVIL;ZESTRIL) 10 MG tablet Take 1 tablet by mouth daily 30 tablet 11    cyclobenzaprine (FLEXERIL) 10 MG tablet Take 1 tablet by mouth 3 times daily as needed for Muscle spasms 90 tablet 11    Multiple Vitamins-Minerals (MULTI-VITAMIN/MINERALS PO) Take 1 tablet by mouth 2 times daily      omeprazole (PRILOSEC) 20 MG delayed release capsule Take 1 capsule by mouth daily 30 capsule 5    Cholecalciferol (VITAMIN D3) 79790 units CAPS Take 1 capsule by mouth daily 30 capsule 5    docusate sodium (COLACE) 100 MG capsule Take 1 capsule by mouth 2 times daily (Patient not taking: Reported on 9/21/2020) 60 capsule 5     No current facility-administered medications for this visit.       Allergies   Allergen Reactions    Latex Swelling     The powder in the gloves    Dilaudid [Hydromorphone Hcl] Nausea And Vomiting    Hydromorphone     Medrol [Methylprednisolone] Other (See Comments)     Back pain    Prednisone Nausea And Vomiting       Health Maintenance   Topic Date Due    Statin Therapy  1960    Cervical cancer screen  12/01/2016    Colon Cancer Screen FIT/FOBT  06/08/2017    Breast cancer screen  06/02/2019    Flu vaccine (1) 09/16/2021 (Originally 9/1/2020)    Shingles Vaccine (1 of 2) 09/16/2021 (Originally 8/9/2008)    Potassium monitoring  09/18/2021    Creatinine monitoring  09/18/2021    Lipid screen  09/18/2025    DTaP/Tdap/Td vaccine (2 - Td) 04/28/2026    Hepatitis C screen  Completed    HIV screen  Completed    Hepatitis A vaccine  Aged Out    Hepatitis B vaccine  Aged Out    Hib vaccine  Aged Out    Meningococcal (ACWY) vaccine  Aged Out    Pneumococcal 0-64 years Vaccine  Aged Out       Subjective:      Review of Systems   Constitutional: Unexpected weight change: weight gain - would like to lose weight. Cardiovascular: Negative for leg swelling. Gastrointestinal: Positive for abdominal pain, diarrhea, nausea and vomiting. Musculoskeletal: Positive for myalgias and neck pain (chronic). Objective:     Vitals:    09/16/20 1112   BP: 132/84   Pulse: 89   Temp: 96.5 °F (35.8 °C)   TempSrc: Tympanic   SpO2: 98%   Weight: 174 lb 9.6 oz (79.2 kg)   Height: 5' 1.5\" (1.562 m)     Physical Exam  Vitals signs and nursing note reviewed. Constitutional:       General: She is not in acute distress. Appearance: She is well-developed. HENT:      Head: Normocephalic and atraumatic. Right Ear: Tympanic membrane, ear canal and external ear normal.      Left Ear: Tympanic membrane, ear canal and external ear normal.      Nose: Nose normal.      Mouth/Throat:      Mouth: Mucous membranes are moist.      Pharynx: Oropharynx is clear. No posterior oropharyngeal erythema. Eyes:      Conjunctiva/sclera: Conjunctivae normal.   Neck:      Musculoskeletal: Neck supple. Cardiovascular:      Rate and Rhythm: Normal rate and regular rhythm. Heart sounds: Normal heart sounds. Pulmonary:      Effort: Pulmonary effort is normal. No respiratory distress. Breath sounds: Normal breath sounds. Abdominal:      General: Bowel sounds are normal. There is no distension. Palpations: Abdomen is soft. Tenderness: There is no abdominal tenderness. Skin:     General: Skin is warm and dry. Neurological:      Mental Status: She is alert and oriented to person, place, and time. Assessment:       Diagnosis Orders   1.  Essential hypertension  Comprehensive Metabolic Panel   2. Bilateral lower abdominal cramping  David Lopez MD, General Surgery, [place]   3. Diarrhea, unspecified type  David Lopez MD, General Surgery, [place]   4. Anemia, unspecified type  CBC With Auto Differential   5. Vitamin D deficiency  Vitamin D 25 Hydroxy   6. Fibromyalgia  Handicap Placard MISC   7. Joint stiffness  Handicap Placard MISC   8. Screening for diabetes mellitus (DM)     9. Screening for cholesterol level  Lipid Panel   10. Screen for colon cancer  David Lopez MD, General Surgery, [place]   11. Encounter for screening mammogram for breast cancer  MAHAD DOROTHY DIGITAL SCREEN BILATERAL   12. Elevated fasting blood sugar  Hemoglobin A1C         Plan:      Return in about 4 months (around 1/16/2021) for well woman exam with Pap.     Orders Placed This Encounter   Procedures    MAHAD DOROTHY DIGITAL SCREEN BILATERAL     Standing Status:   Future     Standing Expiration Date:   11/16/2021     Order Specific Question:   Reason for exam:     Answer:   screening    Comprehensive Metabolic Panel     Standing Status:   Future     Number of Occurrences:   1     Standing Expiration Date:   9/16/2021    Lipid Panel     Standing Status:   Future     Number of Occurrences:   1     Standing Expiration Date:   9/16/2021     Order Specific Question:   Is Patient Fasting?/# of Hours     Answer:   12 hour    CBC With Auto Differential     Standing Status:   Future     Number of Occurrences:   1     Standing Expiration Date:   9/16/2021    Vitamin D 25 Hydroxy     Standing Status:   Future     Number of Occurrences:   1     Standing Expiration Date:   9/16/2021    Hemoglobin A1C     Standing Status:   Future     Number of Occurrences:   1     Standing Expiration Date:   9/18/2021   David Lopez MD, General Surgery, [place]     Referral Priority:   Routine     Referral Type:   Eval and Treat     Referral Reason:   Specialty Services Required     Referred to Provider:   Charisse Nissen, MD     Requested Specialty:   General Surgery     Number of Visits Requested:   1     Orders Placed This Encounter   Medications    Handicap Placard [facility]     Sig: by Does not apply route Issue parking placard for persons with disabilities. [facility] sec 4503.44. Expires in 5 years. Applicant meets the qualifying disability criteria. Dispense:  1 each     Refill:  0       Patient given educational materials - see patient instructions. Discussed use, benefit, and side effects of prescribed medications. All patient questions answered. Pt voiced understanding. Reviewed health maintenance.             Electronically signed by Hussein Coyle DO on 9/27/2020 at 11:38 PM

## 2020-09-16 NOTE — PATIENT INSTRUCTIONS
Patient Education        Learning About Vitamin D  Why is it important to get enough vitamin D? Your body needs vitamin D to absorb calcium. Calcium keeps your bones and muscles, including your heart, healthy and strong. If your muscles don't get enough calcium, they can cramp, hurt, or feel weak. You may have long-term (chronic) muscle aches and pains. If you don't get enough vitamin D throughout life, you have an increased chance of having thin and brittle bones (osteoporosis) in your later years. Children who don't get enough vitamin D may not grow as much as others their age. They also have a chance of getting a rare disease called rickets. It causes weak bones. Vitamin D and calcium are added to many foods. And your body uses sunshine to make its own vitamin D. How much vitamin D do you need? The [place] [employer] recommends that people ages 3 through 79 get 600 IU (international units) every day. Adults 71 and older need 800 IU every day. Blood tests for vitamin D can check your vitamin D level. But there is no standard normal range used by all laboratories. You're likely getting enough vitamin D if your levels are in the range of 20 to 50 ng/mL. How can you get more vitamin D? Foods that contain vitamin D include:  · [place], tuna, and mackerel. These are some of the best foods to eat when you need to get more vitamin D.  · Cheese, egg yolks, and beef liver. These foods have vitamin D in small amounts. · Milk, soy drinks, orange juice, yogurt, margarine, and some kinds of cereal have vitamin D added to them. Some people don't make vitamin D as well as others. They may have to take extra care in getting enough vitamin D. Things that reduce how much vitamin D your body makes include:  · Dark skin, such as many  Americans have. · Age, especially if you are older than 72. · Digestive problems, such as Crohn's or celiac disease. · Liver and kidney disease.   Some people who do not get enough vitamin D may need supplements. Are there any risks from taking vitamin D?  · Too much vitamin D:  ? Can damage your kidneys. ? Can cause nausea and vomiting, constipation, and weakness. ? Raises the amount of calcium in your blood. If this happens, you can get confused or have an irregular heart rhythm. · Vitamin D may interact with other medicines. Tell your doctor about all of the medicines you take, including over-the-counter drugs, herbs, and pills. Tell your doctor about all of your current medical problems. Where can you learn more? Go to https://Fly Apparelpeclaraeweb.I AM AT. org and sign in to your OtherInbox account. Enter [id number] in the [facility] box to learn more about \"Learning About Vitamin D. \"     If you do not have an account, please click on the \"Sign Up Now\" link. Current as of: August 22, 2019               Content Version: 12.5  © 9664-1759 Healthwise, Incorporated. Care instructions adapted under license by [facility] ([facility]). If you have questions about a medical condition or this instruction, always ask your healthcare professional. [place] [address] any warranty or liability for your use of this information.

## 2020-09-18 ENCOUNTER — HOSPITAL ENCOUNTER (OUTPATIENT)
Dept: LAB | Age: 60
Discharge: HOME | End: 2020-09-18
Payer: MEDICARE

## 2020-09-18 LAB
ABSOLUTE EOS #: 0.34 K/UL (ref 0–0.44)
ABSOLUTE IMMATURE GRANULOCYTE: <0.03 K/UL (ref 0–0.3)
ABSOLUTE LYMPH #: 1.68 K/UL (ref 1.1–3.7)
ABSOLUTE MONO #: 0.58 K/UL (ref 0.1–1.2)
ALBUMIN SERPL-MCNC: 4.1 G/DL (ref 3.5–5.2)
ALBUMIN/GLOBULIN RATIO: 1.1 (ref 1–2.5)
ALP BLD-CCNC: 74 U/L (ref 35–104)
ALT SERPL-CCNC: 12 U/L (ref 5–33)
ANION GAP SERPL CALCULATED.3IONS-SCNC: 11 MMOL/L (ref 9–17)
AST SERPL-CCNC: 15 U/L
BASOPHILS # BLD: 1 % (ref 0–2)
BASOPHILS ABSOLUTE: 0.03 K/UL (ref 0–0.2)
BILIRUB SERPL-MCNC: 0.17 MG/DL (ref 0.3–1.2)
BUN BLDV-MCNC: 12 MG/DL (ref 8–23)
BUN/CREAT BLD: 19 (ref 9–20)
CALCIUM SERPL-MCNC: 9.3 MG/DL (ref 8.6–10.4)
CHLORIDE BLD-SCNC: 103 MMOL/L (ref 98–107)
CHOLESTEROL/HDL RATIO: 5.9
CHOLESTEROL: 211 MG/DL
CO2: 22 MMOL/L (ref 20–31)
CREAT SERPL-MCNC: 0.63 MG/DL (ref 0.5–0.9)
DIFFERENTIAL TYPE: ABNORMAL
EOSINOPHILS RELATIVE PERCENT: 6 % (ref 1–4)
ESTIMATED AVERAGE GLUCOSE: 111 MG/DL
GFR AFRICAN AMERICAN: >60 ML/MIN
GFR NON-AFRICAN AMERICAN: >60 ML/MIN
GFR SERPL CREATININE-BSD FRML MDRD: ABNORMAL ML/MIN/{1.73_M2}
GFR SERPL CREATININE-BSD FRML MDRD: ABNORMAL ML/MIN/{1.73_M2}
GLUCOSE BLD-MCNC: 90 MG/DL (ref 70–99)
HBA1C MFR BLD: 5.5 % (ref 4.8–5.9)
HCT VFR BLD CALC: 38.7 % (ref 36.3–47.1)
HDLC SERPL-MCNC: 36 MG/DL
HEMOGLOBIN: 11.8 G/DL (ref 11.9–15.1)
IMMATURE GRANULOCYTES: 0 %
LDL CHOLESTEROL: 120 MG/DL (ref 0–130)
LYMPHOCYTES # BLD: 31 % (ref 24–43)
MCH RBC QN AUTO: 26.1 PG (ref 25.2–33.5)
MCHC RBC AUTO-ENTMCNC: 30.5 G/DL (ref 25.2–33.5)
MCV RBC AUTO: 85.6 FL (ref 82.6–102.9)
MONOCYTES # BLD: 11 % (ref 3–12)
NRBC AUTOMATED: 0 PER 100 WBC
PDW BLD-RTO: 14.4 % (ref 11.8–14.4)
PLATELET # BLD: 365 K/UL (ref 138–453)
PLATELET ESTIMATE: ABNORMAL
PMV BLD AUTO: 9.9 FL (ref 8.1–13.5)
POTASSIUM SERPL-SCNC: 4 MMOL/L (ref 3.7–5.3)
RBC # BLD: 4.52 M/UL (ref 3.95–5.11)
RBC # BLD: ABNORMAL 10*6/UL
SEG NEUTROPHILS: 51 % (ref 36–65)
SEGMENTED NEUTROPHILS ABSOLUTE COUNT: 2.79 K/UL (ref 1.5–8.1)
SODIUM BLD-SCNC: 136 MMOL/L (ref 135–144)
TOTAL PROTEIN: 7.9 G/DL (ref 6.4–8.3)
TRIGL SERPL-MCNC: 275 MG/DL
VITAMIN D 25-HYDROXY: 22.6 NG/ML (ref 30–100)
VLDLC SERPL CALC-MCNC: ABNORMAL MG/DL (ref 1–30)
WBC # BLD: 5.4 K/UL (ref 3.5–11.3)
WBC # BLD: ABNORMAL 10*3/UL

## 2020-09-18 PROCEDURE — 80053 COMPREHEN METABOLIC PANEL: CPT

## 2020-09-18 PROCEDURE — 82306 VITAMIN D 25 HYDROXY: CPT

## 2020-09-18 PROCEDURE — 85025 COMPLETE CBC W/AUTO DIFF WBC: CPT

## 2020-09-18 PROCEDURE — 83036 HEMOGLOBIN GLYCOSYLATED A1C: CPT

## 2020-09-18 PROCEDURE — 36415 COLL VENOUS BLD VENIPUNCTURE: CPT

## 2020-09-18 PROCEDURE — 80061 LIPID PANEL: CPT

## 2020-09-21 ENCOUNTER — INITIAL CONSULT (OUTPATIENT)
Dept: SURGERY | Age: 60
End: 2020-09-21
Payer: MEDICARE

## 2020-09-21 VITALS
HEIGHT: 60 IN | HEART RATE: 110 BPM | SYSTOLIC BLOOD PRESSURE: 139 MMHG | BODY MASS INDEX: 33.96 KG/M2 | TEMPERATURE: 98.8 F | DIASTOLIC BLOOD PRESSURE: 80 MMHG | OXYGEN SATURATION: 97 % | WEIGHT: 173 LBS

## 2020-09-21 PROCEDURE — 99204 OFFICE O/P NEW MOD 45 MIN: CPT | Performed by: SURGERY

## 2020-09-21 PROCEDURE — 1036F TOBACCO NON-USER: CPT | Performed by: SURGERY

## 2020-09-21 PROCEDURE — G8427 DOCREV CUR MEDS BY ELIG CLIN: HCPCS | Performed by: SURGERY

## 2020-09-21 PROCEDURE — G8417 CALC BMI ABV UP PARAM F/U: HCPCS | Performed by: SURGERY

## 2020-09-21 PROCEDURE — 99215 OFFICE O/P EST HI 40 MIN: CPT

## 2020-09-21 PROCEDURE — 3017F COLORECTAL CA SCREEN DOC REV: CPT | Performed by: SURGERY

## 2020-09-21 RX ORDER — POLYETHYLENE GLYCOL 3350, SODIUM CHLORIDE, SODIUM BICARBONATE, POTASSIUM CHLORIDE 420; 11.2; 5.72; 1.48 G/4L; G/4L; G/4L; G/4L
4000 POWDER, FOR SOLUTION ORAL ONCE
Qty: 4000 ML | Refills: 0 | Status: SHIPPED | OUTPATIENT
Start: 2020-09-21 | End: 2020-09-21

## 2020-09-21 RX ORDER — BISACODYL 5 MG
10 TABLET, DELAYED RELEASE (ENTERIC COATED) ORAL ONCE
Qty: 2 TABLET | Refills: 0 | Status: SHIPPED | OUTPATIENT
Start: 2020-09-21 | End: 2020-09-21

## 2020-09-21 ASSESSMENT — ENCOUNTER SYMPTOMS
VOMITING: 1
VOICE CHANGE: 0
TROUBLE SWALLOWING: 0
SINUS PRESSURE: 0
ABDOMINAL PAIN: 1
NAUSEA: 1
BLOOD IN STOOL: 1
DIARRHEA: 1
CONSTIPATION: 1
ABDOMINAL DISTENTION: 1

## 2020-09-21 NOTE — PATIENT INSTRUCTIONS
fluid after the test to replace the fluids you may have lost during the colon prep. But don't drink alcohol. Your doctor will talk to you about when you'll need your next colonoscopy. The results of your test and your risk for colorectal cancer will help your doctor decide how often you need to be checked. After the test, you may be bloated or have gas pains. You may need to pass gas. If a biopsy was done or a polyp was removed, you may have streaks of blood in your stool (feces) for a few days. If polyps were taken out, your doctor may tell you to avoid taking aspirin and nonsteroidal anti-inflammatory drugs (NSAIDs) for 7 to 14 days. Problems such as heavy rectal bleeding may not occur until several weeks after the test. This isn't common. But it can happen after polyps are removed. Follow-up care is a key part of your treatment and safety. Be sure to make and go to all appointments, and call your doctor if you are having problems. It's also a good idea to know your test results and keep a list of the medicines you take. Where can you learn more? Go to https://Wapi.[employer] [place]. org and sign in to your [employer] account. Enter V828 in the [employer] box to learn more about \"Learning About Colonoscopy. \"     If you do not have an account, please click on the \"Sign Up Now\" link. Current as of: August 22, 2019               Content Version: 12.5  © 2526-8858 Healthwise, Incorporated. Care instructions adapted under license by [facility] ([facility]). If you have questions about a medical condition or this instruction, always ask your healthcare professional. [address] any warranty or liability for your use of this information.

## 2020-09-21 NOTE — PROGRESS NOTES
Subjective:      Patient ID: Brittany Ivan is a 56 y.o. female. HPI  Episodes of diarrhea and cramping, sometimes with bleeding. With the episodes diarrhea occur with cramps (but the cramps below are different), will have multiple stools. Says her stools have chemical or metallic smell. Always feels bloated. Sometimes will have nausea and vomiting too. No weight loss    Cramps seem to be the biggest problems, across lower abdomen. The cramps last most of the day. Occur at least once a week. She uses Tylenol and heating seem to helps some. Reminds her of menstrual cramps, but she has had a hysterectomy. Cramps are not related to the diarrhea. Follows a lactose free diet, and tries to avoid FODMAPs. Food does seem to trigger her diarrhea, but it is not consistent. Uses Bentyl as well, which can help. Also occasionally gets constipated. Past Medical History:   Diagnosis Date    Anxiety     Asthma     Cerebral artery occlusion with cerebral infarction [facility]) 2009    Cerebrovascular disease     stroke     Depression     Diverticulitis     Fibromyalgia     History of small bowel obstruction     Hypertension     [facility]upus ([facility] [address]     Seizures ([facility] [address]  and 2006     Past Surgical History:   Procedure Laterality Date    APPENDECTOMY       SECTION      x 4    COLONOSCOPY  2007    nl    COLONOSCOPY  2015    1 bx    DILATION AND CURETTAGE OF UTERUS      HYSTERECTOMY  2009    TAHBSO due to fibroids MMR    OTHER SURGICAL HISTORY Left 2016    C6 TFE    [place] EGD TRANSORAL BIOPSY SINGLE/MULTIPLE N/A 2017    EGD BIOPSY performed by Bernardo Nelson MD at [address] [facility]  3/1/16    16 removed on bottom 1 on top     TUBAL LIGATION       Current Outpatient Medications   Medication Sig Dispense Refill    MAGNESIUM PO Take by mouth      Handicap Placard MISC by Does not apply route Issue parking placard for persons with disabilities. [facility] sec 4503.44.  Expires in 5 years. Applicant meets the qualifying disability criteria. 1 each 0    diclofenac sodium 1 % GEL Apply 2 g topically 4 times daily 3 Tube 3    dicyclomine (BENTYL) 10 MG capsule Take 1 capsule by mouth 4 times daily as needed (Cramps/Abdominal Pain) 120 capsule 3    lisinopril (PRINIVIL;ZESTRIL) 10 MG tablet Take 1 tablet by mouth daily 30 tablet 11    cyclobenzaprine (FLEXERIL) 10 MG tablet Take 1 tablet by mouth 3 times daily as needed for Muscle spasms 90 tablet 11    Multiple Vitamins-Minerals (MULTI-VITAMIN/MINERALS PO) Take 1 tablet by mouth 2 times daily      omeprazole (PRILOSEC) 20 MG delayed release capsule Take 1 capsule by mouth daily 30 capsule 5    Cholecalciferol (VITAMIN D3) 92422 units CAPS Take 1 capsule by mouth daily 30 capsule 5    docusate sodium (COLACE) 100 MG capsule Take 1 capsule by mouth 2 times daily (Patient not taking: Reported on 2020) 60 capsule 5     No current facility-administered medications for this visit. Allergies   Allergen Reactions    Latex Swelling     The powder in the gloves    Dilaudid [Hydromorphone Hcl] Nausea And Vomiting    Hydromorphone     Medrol [Methylprednisolone] Other (See Comments)     Back pain    Prednisone Nausea And Vomiting     Social History     Tobacco Use    Smoking status: Former Smoker     Last attempt to quit: 1993     Years since quittin.4    Smokeless tobacco: Never Used    Tobacco comment: robinsonalkaitlynn rrt 3/12/17   Substance Use Topics    Alcohol use: Yes     Alcohol/week: 0.0 standard drinks     Comment: has about 1 or 2 a year.  very rarely    Drug use: No     Family History   Problem Relation Age of Onset    Diabetes Mother         Passed due to DM at 62    Other Mother         enlarged heart    Dementia Father     Other Father         Brain tumor     Diabetes Maternal Aunt     Diabetes Maternal Uncle     Glaucoma Neg Hx          Review of Systems   Constitutional: Negative for chills, fever and right inguinal area. Comments: She has generalized tenderness. The tenderness is mild   Lymphadenopathy:      Cervical: No cervical adenopathy. Upper Body:      Right upper body: No supraclavicular or axillary adenopathy. Left upper body: No supraclavicular or axillary adenopathy. Lower Body: No right inguinal adenopathy. No left inguinal adenopathy. Skin:     General: Skin is warm and dry. Neurological:      General: No focal deficit present. Mental Status: She is alert and oriented to person, place, and time. Psychiatric:         Mood and Affect: Mood normal.         Behavior: Behavior normal.         Thought Content: Thought content normal.         Judgment: Judgment normal.       Lab work from 3 days ago review  Colonoscopy from 2015 reviewed  CT from April 2019 review  Assessment:      1) Chronic intermitant diarrhea with abdominal pain, nausea and vomiting. With rectal bleeding  - The sporadic nature of her symptoms make me think she has food intolerances, even though she doesn't believed this is the case. - She did have non-specific colitis on her previous colonoscopy  2) Cramping Lower abdominal pain separate from the cramps with diarrhea. - Hard to know what to make of this. Almost seems musculoskeletal or perhaps urinary although she denies other urinary symptoms. Plan:      1) Colonoscopy - may not show much as I think her problems are largely functional. However with the rectal bleeding, and possible previous colitis, I think this is a reasonable thing to do. It will not make her feel better, but it can guide our recommendations in the next steps in treatment and/or evaluation. Risks and benefits of colonoscopy were discussed with Lance Kumar. In particular I discussed the possibility of incomplete colonoscopy and failure to make a diagnosis. I also discussed the risks and consequences of reactions to the sedatives, bleeding and perforation. Alternate ways of evaluating the colon including barium enema, CT colonography and sigmoidoscopy were discussed. she was also given the opportunity to have any questions answered, and encouraged to call the office with additional issues.          Prudencio Vizcaino MD

## 2020-09-24 ENCOUNTER — HOSPITAL ENCOUNTER (OUTPATIENT)
Dept: PREADMISSION TESTING | Age: 60
Setting detail: SPECIMEN
Discharge: HOME | End: 2020-09-28
Payer: MEDICARE

## 2020-09-24 PROCEDURE — U0003 INFECTIOUS AGENT DETECTION BY NUCLEIC ACID (DNA OR RNA); SEVERE ACUTE RESPIRATORY SYNDROME CORONAVIRUS 2 (SARS-COV-2) (CORONAVIRUS DISEASE [COVID-19]), AMPLIFIED PROBE TECHNIQUE, MAKING USE OF HIGH THROUGHPUT TECHNOLOGIES AS DESCRIBED BY CMS-2020-01-R: HCPCS

## 2020-09-26 LAB — SARS-COV-2, NAA: NOT DETECTED

## 2020-09-27 ENCOUNTER — TELEPHONE (OUTPATIENT)
Dept: PRIMARY CARE CLINIC | Age: 60
End: 2020-09-27

## 2020-09-28 PROBLEM — R10.30 LOWER ABDOMINAL PAIN: Status: ACTIVE | Noted: 2020-09-28

## 2020-09-28 PROBLEM — R19.7 DIARRHEA: Status: ACTIVE | Noted: 2020-09-28

## 2020-09-28 ASSESSMENT — ENCOUNTER SYMPTOMS
ABDOMINAL PAIN: 1
DIARRHEA: 1
BLOOD IN STOOL: 1
CONSTIPATION: 1
SINUS PRESSURE: 0
ABDOMINAL DISTENTION: 1
TROUBLE SWALLOWING: 0
NAUSEA: 1
VOICE CHANGE: 0
VOMITING: 1

## 2020-09-28 NOTE — H&P
Subjective:      Patient ID: Lane Knox is a 56 y.o. female. HPI  Episodes of diarrhea and cramping, sometimes with bleeding. With the episodes diarrhea occur with cramps (but the cramps below are different), will have multiple stools. Says her stools have chemical or metallic smell. Always feels bloated. Sometimes will have nausea and vomiting too. No weight loss    Cramps seem to be the biggest problems, across lower abdomen. The cramps last most of the day. Occur at least once a week. She uses Tylenol and heating seem to helps some. Reminds her of menstrual cramps, but she has had a hysterectomy. Cramps are not related to the diarrhea. Follows a lactose free diet, and tries to avoid FODMAPs. Food does seem to trigger her diarrhea, but it is not consistent. Uses Bentyl as well, which can help. Also occasionally gets constipated. Past Medical History:   Diagnosis Date    Anxiety     Asthma     Cerebral artery occlusion with [facility]) 2009    Cerebrovascular disease     stroke     Depression     Diverticulitis     Fibromyalgia     History of small bowel obstruction     Hypertension     Lupus ([facility] [address]     Seizures ([facility] [address]  and      Past Surgical History:   Procedure Laterality Date    APPENDECTOMY       SECTION      x 4    COLONOSCOPY  2007    nl    COLONOSCOPY  2015    1 bx    DILATION AND CURETTAGE OF UTERUS      HYSTERECTOMY  2009    TAHBSO due to fibroids MMR    OTHER SURGICAL HISTORY Left 2016    C6 TFE    [place] EGD TRANSORAL BIOPSY SINGLE/MULTIPLE N/A 2017    EGD BIOPSY performed by Salvatore Castellanos MD at [address] [facility]  3/1/16    16 removed on bottom 1 on top     TUBAL LIGATION       No current facility-administered medications for this encounter.       Current Outpatient Medications   Medication Sig Dispense Refill    MAGNESIUM PO Take by mouth      Handicap Placard MISC by Does not apply route Issue parking placard for persons with disabilities. [facility] sec 4503.44. Expires in 5 years. Applicant meets the qualifying disability criteria. 1 each 0    diclofenac sodium 1 % GEL Apply 2 g topically 4 times daily 3 Tube 3    dicyclomine (BENTYL) 10 MG capsule Take 1 capsule by mouth 4 times daily as needed (Cramps/Abdominal Pain) 120 capsule 3    lisinopril (PRINIVIL;ZESTRIL) 10 MG tablet Take 1 tablet by mouth daily 30 tablet 11    cyclobenzaprine (FLEXERIL) 10 MG tablet Take 1 tablet by mouth 3 times daily as needed for Muscle spasms 90 tablet 11    Multiple Vitamins-Minerals (MULTI-VITAMIN/MINERALS PO) Take 1 tablet by mouth 2 times daily      omeprazole (PRILOSEC) 20 MG delayed release capsule Take 1 capsule by mouth daily 30 capsule 5    Cholecalciferol (VITAMIN D3) 85659 units CAPS Take 1 capsule by mouth daily 30 capsule 5    docusate sodium (COLACE) 100 MG capsule Take 1 capsule by mouth 2 times daily (Patient not taking: Reported on 2020) 60 capsule 5     Allergies   Allergen Reactions    Latex Swelling     The powder in the gloves    Dilaudid [Hydromorphone Hcl] Nausea And Vomiting    Hydromorphone     Medrol [Methylprednisolone] Other (See Comments)     Back pain    Prednisone Nausea And Vomiting     Social History     Tobacco Use    Smoking status: Former Smoker     Last attempt to quit: 1993     Years since quittin.4    Smokeless tobacco: Never Used    Tobacco comment: robinsonalkaitlynn rrt 3/12/17   Substance Use Topics    Alcohol use: Yes     Alcohol/week: 0.0 standard drinks     Comment: has about 1 or 2 a year.  very rarely    Drug use: No     Family History   Problem Relation Age of Onset    Diabetes Mother         Passed due to DM at 62    Other Mother         enlarged heart    Dementia Father     Other Father         Brain tumor     Diabetes Maternal Aunt     Diabetes Maternal Uncle     Glaucoma Neg Hx          Review of Systems   Constitutional: Negative for chills, fever and unexpected weight change. HENT: Positive for nosebleeds. Negative for sinus pressure, trouble swallowing and voice change. I have ulcers in my nose. Has very dry secretions   Gastrointestinal: Positive for abdominal distention, abdominal pain, blood in stool, constipation, diarrhea, nausea and vomiting. Genitourinary: Negative for difficulty urinating, dysuria, frequency, hematuria, pelvic pain and urgency. Musculoskeletal: Positive for myalgias. Skin: Negative for rash and wound. Neurological: Positive for light-headedness and headaches. Negative for dizziness, seizures, syncope, speech difficulty, weakness and numbness. Hematological: Does not bruise/bleed easily. Objective:   Physical Exam  Constitutional:       General: She is not in acute distress. Appearance: Normal appearance. She is obese. She is not ill-appearing. HENT:      Head: Normocephalic and atraumatic. Mouth/Throat:      Lips: Pink. Mouth: Mucous membranes are moist.      Dentition: Has dentures. Palate: No mass. Pharynx: Oropharynx is clear. Eyes:      Extraocular Movements: Extraocular movements intact. Conjunctiva/sclera: Conjunctivae normal.      Pupils: Pupils are equal, round, and reactive to light. Neck:      Musculoskeletal: Normal range of motion and neck supple. No neck rigidity or muscular tenderness. Cardiovascular:      Rate and Rhythm: Normal rate and regular rhythm. Heart sounds: Normal heart sounds. Pulmonary:      Effort: Pulmonary effort is normal. No respiratory distress. Breath sounds: Normal breath sounds. No stridor. No wheezing or rhonchi. Abdominal:      General: Abdomen is flat. Bowel sounds are normal. There is no distension or abdominal bruit. Palpations: Abdomen is soft. Tenderness: There is generalized abdominal tenderness. Hernia: No hernia is present.  There is no hernia in the umbilical area, ventral area, left inguinal area or right inguinal area. Comments: She has generalized tenderness. The tenderness is mild   Lymphadenopathy:      Cervical: No cervical adenopathy. Upper Body:      Right upper body: No supraclavicular or axillary adenopathy. Left upper body: No supraclavicular or axillary adenopathy. Lower Body: No right inguinal adenopathy. No left inguinal adenopathy. Skin:     General: Skin is warm and dry. Neurological:      General: No focal deficit present. Mental Status: She is alert and oriented to person, place, and time. Psychiatric:         Mood and Affect: Mood normal.         Behavior: Behavior normal.         Thought Content: Thought content normal.         Judgment: Judgment normal.       Lab work from 3 days ago review  Colonoscopy from 2015 reviewed  CT from April 2019 review  Assessment:      1) Chronic intermitant diarrhea with abdominal pain, nausea and vomiting. With rectal bleeding  - The sporadic nature of her symptoms make me think she has food intolerances, even though she doesn't believed this is the case. - She did have non-specific colitis on her previous colonoscopy  2) Cramping Lower abdominal pain separate from the cramps with diarrhea. - Hard to know what to make of this. Almost seems musculoskeletal or perhaps urinary although she denies other urinary symptoms. Plan:      1) Colonoscopy - may not show much as I think her problems are largely functional. However with the rectal bleeding, and possible previous colitis, I think this is a reasonable thing to do. It will not make her feel better, but it can guide our recommendations in the next steps in treatment and/or evaluation. Risks and benefits of colonoscopy were discussed with Phillip De Guzman. In particular I discussed the possibility of incomplete colonoscopy and failure to make a diagnosis.   I also discussed the risks and consequences of reactions to the sedatives, bleeding and perforation. Alternate ways of evaluating the colon including barium enema, CT colonography and sigmoidoscopy were discussed. she was also given the opportunity to have any questions answered, and encouraged to call the office with additional issues.          Mindi Paris MD

## 2020-09-29 ENCOUNTER — ANESTHESIA (OUTPATIENT)
Dept: OPERATING ROOM | Age: 60
End: 2020-09-29
Payer: MEDICARE

## 2020-09-29 ENCOUNTER — ANESTHESIA EVENT (OUTPATIENT)
Dept: OPERATING ROOM | Age: 60
End: 2020-09-29
Payer: MEDICARE

## 2020-09-29 ENCOUNTER — HOSPITAL ENCOUNTER (OUTPATIENT)
Age: 60
Setting detail: OUTPATIENT SURGERY
Discharge: HOME | End: 2020-09-29
Attending: SURGERY | Admitting: SURGERY
Payer: MEDICARE

## 2020-09-29 VITALS — TEMPERATURE: 96.8 F | SYSTOLIC BLOOD PRESSURE: 139 MMHG | DIASTOLIC BLOOD PRESSURE: 67 MMHG | OXYGEN SATURATION: 96 %

## 2020-09-29 VITALS
HEIGHT: 60 IN | HEART RATE: 85 BPM | BODY MASS INDEX: 34.32 KG/M2 | OXYGEN SATURATION: 96 % | DIASTOLIC BLOOD PRESSURE: 66 MMHG | TEMPERATURE: 96.8 F | WEIGHT: 174.8 LBS | RESPIRATION RATE: 16 BRPM | SYSTOLIC BLOOD PRESSURE: 139 MMHG

## 2020-09-29 PROCEDURE — 6360000002 HC RX W HCPCS: Performed by: NURSE ANESTHETIST, CERTIFIED REGISTERED

## 2020-09-29 PROCEDURE — 7100000010 HC PHASE II RECOVERY - FIRST 15 MIN: Performed by: SURGERY

## 2020-09-29 PROCEDURE — 3700000001 HC ADD 15 MINUTES (ANESTHESIA): Performed by: SURGERY

## 2020-09-29 PROCEDURE — 45380 COLONOSCOPY AND BIOPSY: CPT | Performed by: SURGERY

## 2020-09-29 PROCEDURE — 2500000003 HC RX 250 WO HCPCS: Performed by: NURSE ANESTHETIST, CERTIFIED REGISTERED

## 2020-09-29 PROCEDURE — 2580000003 HC RX 258: Performed by: SURGERY

## 2020-09-29 PROCEDURE — 2709999900 HC NON-CHARGEABLE SUPPLY: Performed by: SURGERY

## 2020-09-29 PROCEDURE — 7100000011 HC PHASE II RECOVERY - ADDTL 15 MIN: Performed by: SURGERY

## 2020-09-29 PROCEDURE — 3700000000 HC ANESTHESIA ATTENDED CARE: Performed by: SURGERY

## 2020-09-29 PROCEDURE — 3609010300 HC COLONOSCOPY W/BIOPSY SINGLE/MULTIPLE: Performed by: SURGERY

## 2020-09-29 PROCEDURE — 88305 TISSUE EXAM BY PATHOLOGIST: CPT

## 2020-09-29 RX ORDER — METOPROLOL TARTRATE 5 MG/5ML
INJECTION INTRAVENOUS PRN
Status: DISCONTINUED | OUTPATIENT
Start: 2020-09-29 | End: 2020-09-29 | Stop reason: SDUPTHER

## 2020-09-29 RX ORDER — SODIUM CHLORIDE, SODIUM LACTATE, POTASSIUM CHLORIDE, CALCIUM CHLORIDE 600; 310; 30; 20 MG/100ML; MG/100ML; MG/100ML; MG/100ML
INJECTION, SOLUTION INTRAVENOUS CONTINUOUS
Status: DISCONTINUED | OUTPATIENT
Start: 2020-09-29 | End: 2020-09-29 | Stop reason: HOSPADM

## 2020-09-29 RX ORDER — PROPOFOL 10 MG/ML
INJECTION, EMULSION INTRAVENOUS PRN
Status: DISCONTINUED | OUTPATIENT
Start: 2020-09-29 | End: 2020-09-29 | Stop reason: SDUPTHER

## 2020-09-29 RX ADMIN — METOPROLOL TARTRATE 2.5 MG: 5 INJECTION, SOLUTION INTRAVENOUS at 10:13

## 2020-09-29 RX ADMIN — SODIUM CHLORIDE, POTASSIUM CHLORIDE, SODIUM LACTATE AND CALCIUM CHLORIDE: 600; 310; 30; 20 INJECTION, SOLUTION INTRAVENOUS at 09:47

## 2020-09-29 RX ADMIN — PROPOFOL 475 MG: 10 INJECTION, EMULSION INTRAVENOUS at 09:56

## 2020-09-29 RX ADMIN — METOPROLOL TARTRATE 2.5 MG: 5 INJECTION, SOLUTION INTRAVENOUS at 10:11

## 2020-09-29 ASSESSMENT — PAIN DESCRIPTION - DESCRIPTORS
DESCRIPTORS: CRAMPING

## 2020-09-29 ASSESSMENT — PAIN - FUNCTIONAL ASSESSMENT: PAIN_FUNCTIONAL_ASSESSMENT: 0-10

## 2020-09-29 ASSESSMENT — PAIN SCALES - GENERAL
PAINLEVEL_OUTOF10: 0
PAINLEVEL_OUTOF10: 0

## 2020-09-29 NOTE — OP NOTE
Operative Note      Patient: Richard Jacobs  YOB: 1960  MRN: [id number]    Date of Procedure: 9/29/2020    Pre-Op Diagnosis: diarrhea, abdominal pain    Post-Op Diagnosis: Same       Procedure(s):  COLONOSCOPY    Surgeon(s):  Aldair Mathew MD    Assistant:   * No surgical staff found *    Anesthesia: General    Estimated Blood Loss (mL): Minimal    Complications: None    Specimens:   ID Type Source Tests Collected by Time Destination   A : BX CECUM AND ASCENDING COLON Tissue Colon SURGICAL PATHOLOGY Aldair Mathew MD 9/29/2020 1011    B : [facility] TRANSVERSE Tissue Colon SURGICAL PATHOLOGY Aldair Mathew MD 9/29/2020 1012    C : [address] Extension Tissue Colon SURGICAL PATHOLOGY Aldair Mathew MD 9/29/2020 1013    D : [address], MD 9/29/2020 1014    E : [address] MD 9/29/2020 1015        Implants:  * No implants in log *      Drains: * No LDAs found *    Findings: Patient brought the endoscopy area and IV sedation was induced by the CRNA. Scope was been rectum passed proximally. Her bowel prep was good to fair. It was better really than I expected considering she did not really fall the bowel prep instructions because she lost them. She did have quite a bit of retained particulate stool in scattered areas throughout her colon. Not all this can be irrigated and suctioned away unlikely overlooked anything major. Is got a quite tortuous colon and took some work to get the scope all around the cecum. The cecum and the ileocecal valve are visualized. Findings were consistent with previous appendectomy. From there the scope was slowly and carefully withdrawn. Narrowband imaging and Endo cuff device were used. Random biopsies were also done for underlying inflammation that may not be grossly apparent.   He does have some fairly significant hemorrhoids with the scope was retroflexed in the rectum. She does have previous diagnosis of irritable bowel syndrome which is likely the source of much of her symptoms. We will see if the biopsy results show on it and if she wants to pursue additional work-up we will see her back in the office to discuss her options.   Electronically signed by Bibiana Sanabria MD on 9/29/2020 at 10:20 AM

## 2020-09-29 NOTE — ANESTHESIA PRE PROCEDURE
Department of Anesthesiology  Preprocedure Note       Name:  Tammy Ramirez   Age:  56 y.o.  :  1960                                          MRN:  [id number]         Date:  2020      Surgeon: Mark Phelps):  Vicente Moss MD    Procedure: Procedure(s):  COLONOSCOPY    Medications prior to admission:   Prior to Admission medications    Medication Sig Start Date End Date Taking? Authorizing Provider   MAGNESIUM PO Take by mouth 20  Yes Historical Provider, MD   Handicap Placard MISC by Does not apply route Issue parking placard for persons with disabilities. [facility] sec 4503.44. Expires in 5 years. Applicant meets the qualifying disability criteria. 20  Yes Maxwell Ballard, DO   diclofenac sodium 1 % GEL Apply 2 g topically 4 times daily 18  Yes MAX Hallman CNP   lisinopril (PRINIVIL;ZESTRIL) 10 MG tablet Take 1 tablet by mouth daily 18  Yes Maxwell Ballard DO   docusate sodium (COLACE) 100 MG capsule Take 1 capsule by mouth 2 times daily 17  Yes Maxwell Ballard DO   dicyclomine (BENTYL) 10 MG capsule Take 1 capsule by mouth 4 times daily as needed (Cramps/Abdominal Pain) 18   Vicente Moss MD   cyclobenzaprine (FLEXERIL) 10 MG tablet Take 1 tablet by mouth 3 times daily as needed for Muscle spasms 18   MAX Hallman CNP   Multiple Vitamins-Minerals (MULTI-VITAMIN/MINERALS PO) Take 1 tablet by mouth 2 times daily    Historical Provider, MD   omeprazole (PRILOSEC) 20 MG delayed release capsule Take 1 capsule by mouth daily 17   Brianda Rom, DO   Cholecalciferol (VITAMIN D3) 69310 units CAPS Take 1 capsule by mouth daily 17   Brianda Rom, DO       Current medications:    Current Facility-Administered Medications   Medication Dose Route Frequency Provider Last Rate Last Dose    lactated ringers infusion   Intravenous Continuous Vicente Moss  mL/hr at 20 0947         Allergies:     Allergies   Allergen Reactions    Latex Swelling The powder in the gloves    Dilaudid [Hydromorphone Hcl] Nausea And Vomiting    Hydromorphone     Medrol [Methylprednisolone] Other (See Comments)     Back pain    Prednisone Nausea And Vomiting       Problem List:    Patient Active Problem List   Diagnosis Code    Hyperopia of both eyes with astigmatism and presbyopia H52.03, H52.203, H52.4    Cortical cataract of left eye H26.9    Lupus ([address] M32.9    Hypertension I10    Fibromyalgia M79.7    Anxiety F41.9    Depression F32.9    IBS (irritable bowel syndrome) K58.9    Anemia D64.9    Chest pain R07.9    SBO [facility] bowel obstruction) ([facility]) K56.609    Right hip pain M25.551    Neck pain M54.2    Cervical radicular pain M54.12    Left upper quadrant pain R10.12    Epigastric pain R10.13    Facet arthritis of cervical region M47.812    Chronic left shoulder pain M25.512, G89.29    Encounter for chronic pain management G89.29    Vitamin D deficiency E55.9    Near syncope R55    Primary insomnia F51.01    Pain in joint M25.50    Open angle with borderline findings and low glaucoma risk in both eyes H40.013    Ocular migraine G43. 109    Leukocytosis D72.829    Keratitis H16.9    Joint stiffness M25.60    Dry eye syndrome of both lacrimal glands H04.123    Diverticulitis of colon K57.32    Benign essential hypertension I10    Adjustment disorder with anxious mood F43.22    Osteoarthritis of hip, unspecified M16.9    Diarrhea R19.7    Lower abdominal pain R10.30       Past Medical History:        Diagnosis Date    Anxiety     Asthma     Cerebral artery occlusion with cerebral infarction [facility])     Cerebrovascular disease     stroke     Depression     Diverticulitis     Fibromyalgia     History of small bowel obstruction     Hypertension     Lupus ([address]     Seizures ([address] 2005 and 2006       Past Surgical History:        Procedure Laterality Date    APPENDECTOMY       SECTION      x 4    COLONOSCOPY     nl    COLONOSCOPY  2015    1 bx    DILATION AND CURETTAGE OF UTERUS      HYSTERECTOMY  2009    TAHBSO due to fibroids MMR    OTHER SURGICAL HISTORY Left 2016    C6 TFE    [place] EGD TRANSORAL BIOPSY SINGLE/MULTIPLE N/A 2017    EGD BIOPSY performed by Abram Ovalle MD at [address]  3/1/16    16 removed on bottom 1 on top     TUBAL LIGATION         Social History:    Social History     Tobacco Use    Smoking status: Former Smoker     Last attempt to quit: 1993     Years since quittin.4    Smokeless tobacco: Never Used    Tobacco comment: alecia [facility] 3/12/17   Substance Use Topics    Alcohol use: Yes     Alcohol/week: 0.0 standard drinks     Comment: has about 1 or 2 a year. very rarely                                Counseling given: Not Answered  Comment: alecia bernabe 3/12/17      Vital Signs (Current):   Vitals:    20 0921   BP: 138/82   Pulse: 94   Resp: 16   Temp: 35.9 °C (96.7 °F)   TempSrc: Temporal   SpO2: 97%   Weight: 174 lb 12.8 oz (79.3 kg)   Height: 5' (1.524 m)                                              BP Readings from Last 3 Encounters:   20 138/82   20 139/80   20 132/84       NPO Status: Time of last liquid consumption: 2200                        Time of last solid consumption: 1100(hamburger)                        Date of last liquid consumption: 20                        Date of last solid food consumption: 20    BMI:   Wt Readings from Last 3 Encounters:   20 174 lb 12.8 oz (79.3 kg)   20 173 lb (78.5 kg)   20 174 lb 9.6 oz (79.2 kg)     Body mass index is 34.14 kg/m².     CBC:   Lab Results   Component Value Date    WBC 5.4 2020    RBC 4.52 2020    HGB 11.8 2020    HCT 38.7 2020    MCV 85.6 2020    RDW 14.4 2020     2020       CMP:   Lab Results   Component Value Date     2020    K 4.0 2020     2020 CO2 22 09/18/2020    BUN 12 09/18/2020    CREATININE 0.63 09/18/2020    GFRAA >60 09/18/2020    LABGLOM >60 09/18/2020    GLUCOSE 90 09/18/2020    PROT 7.9 09/18/2020    CALCIUM 9.3 09/18/2020    BILITOT 0.17 09/18/2020    ALKPHOS 74 09/18/2020    AST 15 09/18/2020    ALT 12 09/18/2020       POC Tests: No results for input(s): POCGLU, POCNA, POCK, POCCL, POCBUN, POCHEMO, POCHCT in the last 72 hours. Coags:   Lab Results   Component Value Date    PROTIME 10.1 11/12/2019    INR 1.0 11/12/2019    APTT 26.4 11/19/2018       HCG (If Applicable): No results found for: PREGTESTUR, PREGSERUM, HCG, HCGQUANT     ABGs: No results found for: PHART, PO2ART, ZIP0KBV, UVZ1NDC, BEART, E2ZTMTNC     Type & Screen (If Applicable):  No results found for: LABABO, LABRH    Drug/Infectious Status (If Applicable):  Lab Results   Component Value Date    HEPCAB NONREACTIVE 04/14/2016       COVID-19 Screening (If Applicable):   Lab Results   Component Value Date    COVID19 Not Detected 09/24/2020         Anesthesia Evaluation  Patient summary reviewed and Nursing notes reviewed no history of anesthetic complications:   Airway: Mallampati: II  TM distance: >3 FB   Neck ROM: full  Mouth opening: > = 3 FB Dental:    (+) upper dentures and lower dentures      Pulmonary: breath sounds clear to auscultation  (+) asthma:                            Cardiovascular:  Exercise tolerance: good (>4 METS),   (+) hypertension:,     (-)  angina      Rhythm: regular  Rate: normal           Beta Blocker:  Not on Beta Blocker         Neuro/Psych:   (+) CVA: residual symptoms, psychiatric history:depression/anxiety             GI/Hepatic/Renal:   (+) bowel prep,           Endo/Other:    (+) : arthritis:., .                 Abdominal:           Vascular:   + PVD, aortic or cerebral, . Anesthesia Plan      general and TIVA     ASA 2       Induction: intravenous. Anesthetic plan and risks discussed with patient.       Plan discussed with surgical team.                  MAX Oreilly - EARNEST   9/29/2020

## 2020-09-29 NOTE — ANESTHESIA POSTPROCEDURE EVALUATION
Department of Anesthesiology  Postprocedure Note    Patient: Todd Mendes  MRN: [id number]  YOB: 1960  Date of evaluation: 9/29/2020  Time:  10:22 AM     Procedure Summary     Date:  09/29/20 Room / Location:  [address]    Anesthesia Start:  3251 Anesthesia Stop:  0290    Procedure:  COLONOSCOPY (N/A ) Diagnosis:  (diarrhea, abdominal pain)    Surgeon:  Vicente Moss MD Responsible Provider:  MAX John CRNA    Anesthesia Type:  general, TIVA ASA Status:  2          Anesthesia Type: general, TIVA    Pily Phase I: Pily Score: 10    Pily Phase II:      Last vitals: Reviewed and per EMR flowsheets.        Anesthesia Post Evaluation    Patient location during evaluation: PACU  Patient participation: complete - patient participated  Level of consciousness: awake  Pain score: 0  Airway patency: patent  Nausea & Vomiting: no nausea and no vomiting  Complications: no  Cardiovascular status: blood pressure returned to baseline and hemodynamically stable  Respiratory status: acceptable, spontaneous ventilation and room air  Hydration status: euvolemic

## 2020-09-30 LAB — SURGICAL PATHOLOGY REPORT: NORMAL

## 2020-10-01 ENCOUNTER — APPOINTMENT (OUTPATIENT)
Dept: CT IMAGING | Age: 60
End: 2020-10-01
Payer: MEDICARE

## 2020-10-01 ENCOUNTER — HOSPITAL ENCOUNTER (EMERGENCY)
Age: 60
Discharge: HOME | End: 2020-10-01
Attending: EMERGENCY MEDICINE
Payer: MEDICARE

## 2020-10-01 VITALS
OXYGEN SATURATION: 98 % | WEIGHT: 172 LBS | HEART RATE: 95 BPM | HEIGHT: 60 IN | DIASTOLIC BLOOD PRESSURE: 82 MMHG | TEMPERATURE: 98 F | BODY MASS INDEX: 33.77 KG/M2 | SYSTOLIC BLOOD PRESSURE: 138 MMHG | RESPIRATION RATE: 17 BRPM

## 2020-10-01 LAB
ABSOLUTE EOS #: 0.35 K/UL (ref 0–0.44)
ABSOLUTE IMMATURE GRANULOCYTE: 0.03 K/UL (ref 0–0.3)
ABSOLUTE LYMPH #: 1.81 K/UL (ref 1.1–3.7)
ABSOLUTE MONO #: 0.59 K/UL (ref 0.1–1.2)
ALBUMIN SERPL-MCNC: 4.3 G/DL (ref 3.5–5.2)
ALBUMIN/GLOBULIN RATIO: 1.2 (ref 1–2.5)
ALP BLD-CCNC: 73 U/L (ref 35–104)
ALT SERPL-CCNC: 12 U/L (ref 5–33)
AMYLASE: 71 U/L (ref 28–100)
ANION GAP SERPL CALCULATED.3IONS-SCNC: 13 MMOL/L (ref 9–17)
AST SERPL-CCNC: 16 U/L
BASOPHILS # BLD: 0 % (ref 0–2)
BASOPHILS ABSOLUTE: <0.03 K/UL (ref 0–0.2)
BILIRUB SERPL-MCNC: 0.18 MG/DL (ref 0.3–1.2)
BILIRUBIN DIRECT: <0.08 MG/DL
BILIRUBIN, INDIRECT: ABNORMAL MG/DL (ref 0–1)
BNP INTERPRETATION: NORMAL
BUN BLDV-MCNC: 10 MG/DL (ref 8–23)
BUN/CREAT BLD: 15 (ref 9–20)
CALCIUM SERPL-MCNC: 9.3 MG/DL (ref 8.6–10.4)
CHLORIDE BLD-SCNC: 100 MMOL/L (ref 98–107)
CO2: 24 MMOL/L (ref 20–31)
CREAT SERPL-MCNC: 0.66 MG/DL (ref 0.5–0.9)
DIFFERENTIAL TYPE: ABNORMAL
EKG ATRIAL RATE: 118 BPM
EKG P AXIS: 47 DEGREES
EKG P-R INTERVAL: 144 MS
EKG Q-T INTERVAL: 360 MS
EKG QRS DURATION: 80 MS
EKG QTC CALCULATION (BAZETT): 491 MS
EKG R AXIS: 46 DEGREES
EKG T AXIS: 78 DEGREES
EKG VENTRICULAR RATE: 112 BPM
EOSINOPHILS RELATIVE PERCENT: 4 % (ref 1–4)
GFR AFRICAN AMERICAN: >60 ML/MIN
GFR NON-AFRICAN AMERICAN: >60 ML/MIN
GFR SERPL CREATININE-BSD FRML MDRD: ABNORMAL ML/MIN/{1.73_M2}
GFR SERPL CREATININE-BSD FRML MDRD: ABNORMAL ML/MIN/{1.73_M2}
GLOBULIN: 3.6 G/DL (ref 1.5–3.8)
GLUCOSE BLD-MCNC: 146 MG/DL (ref 70–99)
HCT VFR BLD CALC: 37.8 % (ref 36.3–47.1)
HEMOGLOBIN: 11.9 G/DL (ref 11.9–15.1)
IMMATURE GRANULOCYTES: 0 %
INR BLD: 1
LIPASE: 32 U/L (ref 13–60)
LYMPHOCYTES # BLD: 21 % (ref 24–43)
MCH RBC QN AUTO: 26.7 PG (ref 25.2–33.5)
MCHC RBC AUTO-ENTMCNC: 31.5 G/DL (ref 25.2–33.5)
MCV RBC AUTO: 84.8 FL (ref 82.6–102.9)
MONOCYTES # BLD: 7 % (ref 3–12)
NRBC AUTOMATED: 0 PER 100 WBC
PARTIAL THROMBOPLASTIN TIME: 24.9 SEC (ref 23.9–33.8)
PDW BLD-RTO: 14.6 % (ref 11.8–14.4)
PLATELET # BLD: 391 K/UL (ref 138–453)
PLATELET ESTIMATE: ABNORMAL
PMV BLD AUTO: 9.7 FL (ref 8.1–13.5)
POTASSIUM SERPL-SCNC: 3.4 MMOL/L (ref 3.7–5.3)
PRO-BNP: <20 PG/ML
PROTHROMBIN TIME: 12.8 SEC (ref 11.5–14.2)
RBC # BLD: 4.46 M/UL (ref 3.95–5.11)
RBC # BLD: ABNORMAL 10*6/UL
SEG NEUTROPHILS: 68 % (ref 36–65)
SEGMENTED NEUTROPHILS ABSOLUTE COUNT: 5.71 K/UL (ref 1.5–8.1)
SODIUM BLD-SCNC: 137 MMOL/L (ref 135–144)
TOTAL PROTEIN: 7.9 G/DL (ref 6.4–8.3)
TROPONIN INTERP: NORMAL
TROPONIN T: NORMAL NG/ML
TROPONIN, HIGH SENSITIVITY: <6 NG/L (ref 0–14)
WBC # BLD: 8.5 K/UL (ref 3.5–11.3)
WBC # BLD: ABNORMAL 10*3/UL

## 2020-10-01 PROCEDURE — 80076 HEPATIC FUNCTION PANEL: CPT

## 2020-10-01 PROCEDURE — 6370000000 HC RX 637 (ALT 250 FOR IP): Performed by: EMERGENCY MEDICINE

## 2020-10-01 PROCEDURE — 85025 COMPLETE CBC W/AUTO DIFF WBC: CPT

## 2020-10-01 PROCEDURE — 82150 ASSAY OF AMYLASE: CPT

## 2020-10-01 PROCEDURE — 84484 ASSAY OF TROPONIN QUANT: CPT

## 2020-10-01 PROCEDURE — 74177 CT ABD & PELVIS W/CONTRAST: CPT

## 2020-10-01 PROCEDURE — 2580000003 HC RX 258: Performed by: EMERGENCY MEDICINE

## 2020-10-01 PROCEDURE — 85730 THROMBOPLASTIN TIME PARTIAL: CPT

## 2020-10-01 PROCEDURE — 85610 PROTHROMBIN TIME: CPT

## 2020-10-01 PROCEDURE — 83690 ASSAY OF LIPASE: CPT

## 2020-10-01 PROCEDURE — 93005 ELECTROCARDIOGRAM TRACING: CPT | Performed by: EMERGENCY MEDICINE

## 2020-10-01 PROCEDURE — 83880 ASSAY OF NATRIURETIC PEPTIDE: CPT

## 2020-10-01 PROCEDURE — 6360000004 HC RX CONTRAST MEDICATION: Performed by: EMERGENCY MEDICINE

## 2020-10-01 PROCEDURE — 2709999900 CT CHEST PULMONARY EMBOLISM W CONTRAST

## 2020-10-01 PROCEDURE — 99285 EMERGENCY DEPT VISIT HI MDM: CPT

## 2020-10-01 PROCEDURE — 80048 BASIC METABOLIC PNL TOTAL CA: CPT

## 2020-10-01 RX ORDER — 0.9 % SODIUM CHLORIDE 0.9 %
1000 INTRAVENOUS SOLUTION INTRAVENOUS ONCE
Status: COMPLETED | OUTPATIENT
Start: 2020-10-01 | End: 2020-10-01

## 2020-10-01 RX ORDER — ASPIRIN 81 MG/1
324 TABLET, CHEWABLE ORAL ONCE
Status: COMPLETED | OUTPATIENT
Start: 2020-10-01 | End: 2020-10-01

## 2020-10-01 RX ADMIN — ASPIRIN 324 MG: 81 TABLET, CHEWABLE ORAL at 15:47

## 2020-10-01 RX ADMIN — IOPAMIDOL 100 ML: 755 INJECTION, SOLUTION INTRAVENOUS at 16:29

## 2020-10-01 RX ADMIN — SODIUM CHLORIDE 1000 ML: 9 INJECTION, SOLUTION INTRAVENOUS at 15:46

## 2020-10-01 ASSESSMENT — PAIN DESCRIPTION - PAIN TYPE: TYPE: ACUTE PAIN

## 2020-10-01 ASSESSMENT — PAIN SCALES - GENERAL: PAINLEVEL_OUTOF10: 0

## 2020-10-01 ASSESSMENT — PAIN DESCRIPTION - FREQUENCY: FREQUENCY: INTERMITTENT

## 2020-10-01 ASSESSMENT — PAIN DESCRIPTION - DESCRIPTORS: DESCRIPTORS: DULL;STABBING

## 2020-10-01 ASSESSMENT — PAIN DESCRIPTION - DIRECTION: RADIATING_TOWARDS: BACK

## 2020-10-01 ASSESSMENT — PAIN DESCRIPTION - PROGRESSION: CLINICAL_PROGRESSION: GRADUALLY WORSENING

## 2020-10-01 ASSESSMENT — PAIN DESCRIPTION - ONSET: ONSET: ON-GOING

## 2020-10-01 ASSESSMENT — PAIN DESCRIPTION - ORIENTATION: ORIENTATION: MID

## 2020-10-01 ASSESSMENT — PAIN DESCRIPTION - LOCATION: LOCATION: CHEST

## 2020-10-01 NOTE — ED PROVIDER NOTES
[address]      Pt Name: Alison Samayoa  MRN: [id number]  [place] 1960  Date of evaluation: 10/1/2020      CHIEF COMPLAINT       Chief Complaint   Patient presents with    Chest Pain     onset \"yesterday\" +SOB, -diaphoresis,         HISTORY OF PRESENT ILLNESS      The patient presents with palpitation and chest pains. The patient states that this started yesterday. She is having pain in the left anterior chest.  She says she can point right where it hurts and it goes to her back. The patient says it is intermittent and last for about 10 minutes at a time. She is not sure what triggers it. The patient recently had a colonoscopy just 2 days ago for chronic abdominal pain  pain. She says it always feels like menstrual cramps. This has not changed. They did remove some polyps. She has not had any rectal bleeding. She denies nausea. She says she feels a little short of breath with the pain in her chest.  She has no prior history of PE. She has no prior history of ACS. She has not seen a cardiologist in a while. She declines medicine for pain at this time. She states that the pain in her chest is a stabbing, sharp pain. The pain in her abdomen is in the suprapubic area and is a cramping discomfort. REVIEW OF SYSTEMS       All systems reviewed and negative unless noted in HPI. The patient denies fever or constitutional symptoms. Denies vision change. Denies any sore throat or rhinorrhea. Denies any neck pain or stiffness. Palpitations and chest pain with mild shortness of breath. No nausea,  vomiting or diarrhea. Chronic abdominal pain for 1 year. Denies any dysuria. Denies urinary frequency or hematuria. Denies musculoskeletal injury or pain. Denies any weakness, numbness or focal neurologic deficit. Denies any skin rash or edema. No recent psychiatric issues. No easy bruising or bleeding.    Denies any polyuria, polydypsia or history of immunocompromise. PAST MEDICAL HISTORY    has a past medical history of Anxiety, Asthma, Cerebral artery occlusion with cerebral infarction [facility]), Cerebrovascular disease, Depression, Diverticulitis, Fibromyalgia, History of small bowel obstruction, Hypertension, Lupus ([facility] and Seizures ([facility] [address] SURGICAL HISTORY      has a past surgical history that includes Appendectomy;  section; Hysterectomy (); Dilation and curettage of uterus; Tubal ligation; Colonoscopy (); Colonoscopy (2015); Tooth Extraction (3/1/16); other surgical history (Left, 2016); pr egd transoral biopsy single/multiple (N/A, 2017); and Colonoscopy (N/A, 2020). CURRENT MEDICATIONS       Previous Medications    CHOLECALCIFEROL (VITAMIN D3) 87662 UNITS CAPS    Take 1 capsule by mouth daily    CYCLOBENZAPRINE (FLEXERIL) 10 MG TABLET    Take 1 tablet by mouth 3 times daily as needed for Muscle spasms    DICLOFENAC SODIUM 1 % GEL    Apply 2 g topically 4 times daily    DICYCLOMINE (BENTYL) 10 MG CAPSULE    Take 1 capsule by mouth 4 times daily as needed (Cramps/Abdominal Pain)    DOCUSATE SODIUM (COLACE) 100 MG CAPSULE    Take 1 capsule by mouth 2 times daily    HANDICAP PLACARD MISC    by Does not apply route Issue parking placard for persons with disabilities. [facility] sec 4503.44. Expires in 5 years. Applicant meets the qualifying disability criteria. LISINOPRIL (PRINIVIL;ZESTRIL) 10 MG TABLET    Take 1 tablet by mouth daily    MAGNESIUM PO    Take by mouth    MULTIPLE VITAMINS-MINERALS (MULTI-VITAMIN/MINERALS PO)    Take 1 tablet by mouth 2 times daily    OMEPRAZOLE (PRILOSEC) 20 MG DELAYED RELEASE CAPSULE    Take 1 capsule by mouth daily       ALLERGIES     is allergic to latex; dilaudid [hydromorphone hcl]; hydromorphone; medrol [methylprednisolone]; and prednisone. FAMILY HISTORY     She indicated that her mother is . She indicated that her father is .  She indicated that her maternal aunt is . She indicated that her maternal uncle is . She indicated that the status of her neg hx is unknown.     family history includes Dementia in her father; Diabetes in her maternal aunt, maternal uncle, and mother; Other in her father and mother. SOCIAL HISTORY      reports that she quit smoking about 27 years ago. She has never used smokeless tobacco. She reports current alcohol use. She reports that she does not use drugs. PHYSICAL EXAM     INITIAL VITALS:  height is 5' (1.524 m) and weight is 172 lb (78 kg). Her tympanic temperature is 98 °F (36.7 °C). Her blood pressure is 154/88 (abnormal) and her pulse is 100. Her respiration is 16 and oxygen saturation is 99%. The patient is alert and oriented, in mild distress due to anxiety and pain. HEENT is atraumatic. Pupils are PERRL at 4 mm with normal extraocular motion. Mucous membranes moist.    Neck is supple with no lymphadenopathy. No JVD. No meningismus. Heart sounds regular rate and rhythm with no gallops, murmurs, or rubs. Pain in left chest reproducible with palpation. Lungs clear, no wheezes, rales or rhonchi. Abdomen: soft, nontender with no pain to palpation. No pulsatile mass. Normal bowel sounds are noted. No rebound or guarding. Musculoskeletal exam shows no evidence of trauma. Normal distal pulses in all extremities. Skin: no rash or edema. Neurological exam reveals cranial nerves 2 through 12 grossly intact. Patient has equal  and normal deep tendon reflexes. Psychiatric: no hallucinations or suicidal ideation. Lymphatics.:  No lymphadenopathy. DIFFERENTIAL DIAGNOSIS/ MDM:     Chest wall pain, ACS, PE, anemia, arrhythmia, anxiety    DIAGNOSTIC RESULTS     EKG: All EKG's are interpreted by the Emergency Department Physician who either signs or Co-signs this chart in the absence of a cardiologist.    sinus tach 112 with nonspecific ST change.   No morphologic change compared to November 2019. Axis 46, , QRS 80, .    RADIOLOGY:   I reviewed the radiologist interpretations:  CT ABDOMEN PELVIS W IV CONTRAST   Final Result   No acute abdominal or pelvic abnormality. CT CHEST PULMONARY EMBOLISM W CONTRAST   Final Result   No evidence of pulmonary embolism or acute pulmonary abnormality. CT ABDOMEN PELVIS W IV CONTRAST (Final result)   Result time 10/01/20 16:51:58   Final result by Silva Hernandez MD (10/01/20 16:51:58)                 Impression:     No acute abdominal or pelvic abnormality. Narrative:     EXAMINATION:   CT OF THE ABDOMEN AND PELVIS WITH CONTRAST 10/1/2020 4:29 pm     TECHNIQUE:   CT of the abdomen and pelvis was performed with the administration of   intravenous contrast. Multiplanar reformatted images are provided for review. Dose modulation, iterative reconstruction, and/or weight based adjustment of   the mA/kV was utilized to reduce the radiation dose to as low as reasonably   achievable. COMPARISON:   CT abdomen and pelvis performed 05/17/2019. HISTORY:   ORDERING SYSTEM PROVIDED HISTORY: Pain   TECHNOLOGIST PROVIDED HISTORY:   IV Only Contrast   Pain   Reason for Exam: r/o pe. C/o lower abd pain. Upper left chest pain   Acuity: Acute   Type of Exam: Initial     FINDINGS:   Lower Chest: The lung bases are without consolidation or effusion. Visualized cardiac structures are unremarkable. Organs: Liver and spleen are normal size and overall attenuation.  There are   stable hepatic cysts.  Gallbladder is contracted.  The pancreas and adrenal   glands are unremarkable.  The kidneys are without obstructive uropathy.  The   ureters are not dilated.  The urinary bladder is unremarkable. GI/Bowel:  The stomach is unremarkable.  Loops of small bowel are normal in   caliber without evidence for obstruction.  The colon contains air and fecal   residue. Ronak Levans is no intraperitoneal free air or free fluid. Pelvis: The uterus is age-appropriate. Peritoneum/Retroperitoneum: The psoas muscles are symmetric.  The abdominal   aorta is normal in caliber.  The inferior vena cava is unremarkable. Elise Damaso   is no retroperitoneal or mesenteric adenopathy. Bones/Soft Tissues: The extra-abdominal soft tissues are unremarkable. Elise Damaso   is no acute osseous abnormality.                       CT CHEST PULMONARY EMBOLISM W CONTRAST (Final result)   Result time 10/01/20 16:54:06   Final result by Kimani Valdez MD (10/01/20 16:54:06)                 Impression:     No evidence of pulmonary embolism or acute pulmonary abnormality. Narrative:     EXAMINATION:   CTA OF THE CHEST 10/1/2020 4:29 pm     TECHNIQUE:   CTA of the chest was performed after the administration of intravenous   contrast.  Multiplanar reformatted images are provided for review.  MIP   images are provided for review. Dose modulation, iterative reconstruction,   and/or weight based adjustment of the mA/kV was utilized to reduce the   radiation dose to as low as reasonably achievable. COMPARISON:   None. HISTORY:   ORDERING SYSTEM PROVIDED HISTORY: Chest Pain   TECHNOLOGIST PROVIDED HISTORY:   Chest Pain   Reason for Exam: r/o pe. C/o lower abd pain. Upper left chest pain   Acuity: Acute   Type of Exam: Initial     FINDINGS:   Pulmonary Arteries: Pulmonary arteries are adequately opacified for   evaluation.  No evidence of intraluminal filling defect to suggest pulmonary   embolism.  Main pulmonary artery is normal in caliber. Mediastinum: No evidence of mediastinal lymphadenopathy.  The heart and   pericardium demonstrate no acute abnormality.  There is no acute abnormality   of the thoracic aorta. Lungs/pleura: The lungs are without acute process.  No focal consolidation or   pulmonary edema.  No evidence of pleural effusion or pneumothorax.      Upper Abdomen: Limited images of the upper abdomen are unremarkable.      Soft Tissues/Bones: No acute bone or soft tissue abnormality.                        LABS:  Results for orders placed or performed during the hospital encounter of 10/01/20   CBC Auto Differential   Result Value Ref Range    WBC 8.5 3.5 - 11.3 k/uL    RBC 4.46 3.95 - 5.11 m/uL    Hemoglobin 11.9 11.9 - 15.1 g/dL    Hematocrit 37.8 36.3 - 47.1 %    MCV 84.8 82.6 - 102.9 fL    MCH 26.7 25.2 - 33.5 pg    MCHC 31.5 25.2 - 33.5 g/dL    RDW 14.6 (H) 11.8 - 14.4 %    Platelets 275 391 - 703 k/uL    MPV 9.7 8.1 - 13.5 fL    NRBC Automated 0.0 0.0 per 100 WBC    Differential Type NOT REPORTED     Seg Neutrophils 68 (H) 36 - 65 %    Lymphocytes 21 (L) 24 - 43 %    Monocytes 7 3 - 12 %    Eosinophils % 4 1 - 4 %    Basophils 0 0 - 2 %    Immature Granulocytes 0 0 %    Segs Absolute 5.71 1.50 - 8.10 k/uL    Absolute Lymph # 1.81 1.10 - 3.70 k/uL    Absolute Mono # 0.59 0.10 - 1.20 k/uL    Absolute Eos # 0.35 0.00 - 0.44 k/uL    Basophils Absolute <0.03 0.00 - 0.20 k/uL    Absolute Immature Granulocyte 0.03 0.00 - 0.30 k/uL    WBC Morphology NOT REPORTED     RBC Morphology ANISOCYTOSIS PRESENT     Platelet Estimate NOT REPORTED    Basic Metabolic Panel   Result Value Ref Range    Glucose 146 (H) 70 - 99 mg/dL    BUN 10 8 - 23 mg/dL    CREATININE 0.66 0.50 - 0.90 mg/dL    Bun/Cre Ratio 15 9 - 20    Calcium 9.3 8.6 - 10.4 mg/dL    Sodium 137 135 - 144 mmol/L    Potassium 3.4 (L) 3.7 - 5.3 mmol/L    Chloride 100 98 - 107 mmol/L    CO2 24 20 - 31 mmol/L    Anion Gap 13 9 - 17 mmol/L    GFR Non-African American >60 >60 mL/min    GFR African American >60 >60 mL/min    GFR Comment          GFR Staging NOT REPORTED    Hepatic Function Panel   Result Value Ref Range    Alb 4.3 3.5 - 5.2 g/dL    Alkaline Phosphatase 73 35 - 104 U/L    ALT 12 5 - 33 U/L    AST 16 <32 U/L    Total Bilirubin 0.18 (L) 0.3 - 1.2 mg/dL    Bilirubin, Direct <0.08 <0.31 mg/dL    Bilirubin, Indirect CANNOT BE CALCULATED 0.00 - 1.00 mg/dL    Total but occasionally words are mis-transcribed.)    Phillips MD   Attending Emergency Physician         Michelle Figueroa MD  10/01/20 1835

## 2020-10-09 ENCOUNTER — OFFICE VISIT (OUTPATIENT)
Dept: SURGERY | Age: 60
End: 2020-10-09
Payer: MEDICARE

## 2020-10-09 ENCOUNTER — HOSPITAL ENCOUNTER (OUTPATIENT)
Dept: LAB | Age: 60
Discharge: HOME | End: 2020-10-09
Payer: MEDICARE

## 2020-10-09 VITALS
SYSTOLIC BLOOD PRESSURE: 126 MMHG | RESPIRATION RATE: 20 BRPM | TEMPERATURE: 96.8 F | WEIGHT: 176 LBS | BODY MASS INDEX: 34.55 KG/M2 | HEART RATE: 100 BPM | HEIGHT: 60 IN | DIASTOLIC BLOOD PRESSURE: 82 MMHG

## 2020-10-09 LAB — TSH SERPL DL<=0.05 MIU/L-ACNC: 3.76 MIU/L (ref 0.3–5)

## 2020-10-09 PROCEDURE — G8417 CALC BMI ABV UP PARAM F/U: HCPCS | Performed by: SURGERY

## 2020-10-09 PROCEDURE — 99213 OFFICE O/P EST LOW 20 MIN: CPT | Performed by: SURGERY

## 2020-10-09 PROCEDURE — G8427 DOCREV CUR MEDS BY ELIG CLIN: HCPCS | Performed by: SURGERY

## 2020-10-09 PROCEDURE — 1036F TOBACCO NON-USER: CPT | Performed by: SURGERY

## 2020-10-09 PROCEDURE — G8484 FLU IMMUNIZE NO ADMIN: HCPCS | Performed by: SURGERY

## 2020-10-09 PROCEDURE — 84443 ASSAY THYROID STIM HORMONE: CPT

## 2020-10-09 PROCEDURE — 3017F COLORECTAL CA SCREEN DOC REV: CPT | Performed by: SURGERY

## 2020-10-09 PROCEDURE — 99214 OFFICE O/P EST MOD 30 MIN: CPT

## 2020-10-09 PROCEDURE — 36415 COLL VENOUS BLD VENIPUNCTURE: CPT

## 2020-10-09 RX ORDER — HYOSCYAMINE SULFATE EXTENDED-RELEASE 0.38 MG/1
375 TABLET ORAL EVERY 12 HOURS PRN
Qty: 60 TABLET | Refills: 3 | Status: SHIPPED | OUTPATIENT
Start: 2020-10-09

## 2020-10-09 NOTE — LETTER
Your Capsule Endoscopy is scheduled for      at     Patient Instructions for Capsule Endoscopy    1. At noon the day before the procedure start a clear liquid diet. Clear liquid diet includes:    Clear broth    Water    Coffee and tea (as long as there is no milk or sweetner)    Soda    Ascencion-aid    Popsicles    Jello    Apple juice    Ginger ale    *NO RED OR PURPLE*     ~DO NOT EAT OR DRINK AFTER 10:00PM~    2. Do not smoke 24 hours prior to having Capsule Endoscopy. 3. You can take your medications the day before your procedure as normal. DO NOT take you medication the morning of your test. You will be able to take the medication at 10:00 am a couple hours after you have swallowed the capsule. 4. The day of the capsule you may eat a light lunch at 12:30pm.    5. You will return to the office at 4:15 pm for the removal of the equipment. DO NOT REMOVE anything prior to your return to the office. 6. Please contact the office if you have a pacemaker in place.     THE DAY OF YOUR PROCEDURE      *Nothing to eat or drink the morning of your procedure   *Wear a comfortable loose fitting 2 piece outfit   *Arrive back to the office at 4:15 pm

## 2020-10-09 NOTE — PROGRESS NOTES
Have 1 - 2 bowel movements a day. Often trigger by eating, but not always. Stools start firm and formed, then becoming loose and watery. Has not found Bentyl useful. Uses it about twice a day. Also was in ED with palpitations and left sided chest pain. Cardiac work up was negative, with sinus tachycardia. /82 (Site: Right Upper Arm, Position: Sitting)   Pulse 100   Temp 96.8 °F (36 °C) (Tympanic)   Resp 20   Ht 5' (1.524 m)   Wt 176 lb (79.8 kg)   BMI 34.37 kg/m²     No distress    IMP/PLAN  1) Ongoing abdominal pain  - Probably IBS  - She has had colonoscopy and recent CT scan (and remote CT's) which have been normal.  She had a normal EGD in 2017 for similar symptoms. Normal SBFT in 2016  - Try to set her up for a capsule. It is possible her insurance will insist on an EGD first  2) Try  Levbid  3)  Check thyroid with tachycardia and diarrhea  4) Her diarrhea is pretty limited. 5) Follow up with her PCP with palpitations and tachycardia, or cardiology.

## 2020-10-10 ENCOUNTER — HOSPITAL ENCOUNTER (OUTPATIENT)
Dept: MAMMOGRAPHY | Age: 60
Discharge: HOME | End: 2020-10-12
Payer: MEDICARE

## 2020-10-10 PROCEDURE — 77063 BREAST TOMOSYNTHESIS BI: CPT

## 2020-10-12 ENCOUNTER — OFFICE VISIT (OUTPATIENT)
Dept: FAMILY MEDICINE CLINIC | Age: 60
End: 2020-10-12
Payer: MEDICARE

## 2020-10-12 VITALS
OXYGEN SATURATION: 96 % | WEIGHT: 174 LBS | DIASTOLIC BLOOD PRESSURE: 74 MMHG | SYSTOLIC BLOOD PRESSURE: 134 MMHG | TEMPERATURE: 97.6 F | HEART RATE: 92 BPM | BODY MASS INDEX: 34.16 KG/M2 | HEIGHT: 60 IN

## 2020-10-12 PROCEDURE — 93010 ELECTROCARDIOGRAM REPORT: CPT | Performed by: FAMILY MEDICINE

## 2020-10-12 PROCEDURE — 93005 ELECTROCARDIOGRAM TRACING: CPT | Performed by: FAMILY MEDICINE

## 2020-10-12 PROCEDURE — 3017F COLORECTAL CA SCREEN DOC REV: CPT | Performed by: FAMILY MEDICINE

## 2020-10-12 PROCEDURE — 99214 OFFICE O/P EST MOD 30 MIN: CPT | Performed by: FAMILY MEDICINE

## 2020-10-12 PROCEDURE — 1036F TOBACCO NON-USER: CPT | Performed by: FAMILY MEDICINE

## 2020-10-12 PROCEDURE — G8484 FLU IMMUNIZE NO ADMIN: HCPCS | Performed by: FAMILY MEDICINE

## 2020-10-12 PROCEDURE — G8417 CALC BMI ABV UP PARAM F/U: HCPCS | Performed by: FAMILY MEDICINE

## 2020-10-12 PROCEDURE — G8427 DOCREV CUR MEDS BY ELIG CLIN: HCPCS | Performed by: FAMILY MEDICINE

## 2020-10-12 NOTE — PROGRESS NOTES
LETHA [facility] [address]  [address], [address] [place]  [phone]      Edgar Varela is a 56 y.o. female who presents today for her medical conditions/complaints as noted below. Edgar Varela is c/o of Chest Pain (states her throat and and chest hurts- specially when she eats- no matter what the food is- Dr Forrest Cota is txt'ing for the abd pain- she states she just yudy't feel good- had a full cardiac work up and nothing shows up wrong )      HPI:     Pt here today for ER follow-up. Having pain in her L chest, down her abdomen, into her L hip and down her L leg to the bottom of her foot. Happens all at once - intermittent; does not last long, but cannot keep moving or doing her activity when it happens. In bed most of the weekend, as she feels worse when she is upright. Also feels \"fluttering\" when she eats. Even switched to decaffeinated coffee and off cow's milk - no change in symptoms. No relation to what she eats, when she eats, or how much she eats. Has not tried anything for this - already takes Tylenol as needed for muscle spasms. Did have this issue a few years ago - had cardiac testing at that time, and she was told everything was normal.  Does not remember what made the symptoms stop.     K+ slightly low -         Past Medical History:   Diagnosis Date    Anxiety     Asthma     Cerebral artery occlusion with cerebral infarction [facility]) 2009    Cerebrovascular disease     stroke 1998    Depression     Diverticulitis     Fibromyalgia     History of small bowel obstruction     Hypertension     [address] [facility] [address]     Seizures ([facility] [address]  and       Past Surgical History:   Procedure Laterality Date    APPENDECTOMY       SECTION      x 4    COLONOSCOPY  2007    nl    COLONOSCOPY  2015    1 bx    COLONOSCOPY N/A 2020    COLONOSCOPY performed by Greg Stephens MD at [place] [address] 2009    TAHBSO due to fibroids MMR    OTHER SURGICAL HISTORY Left 2016    C6 TFE    ME EGD TRANSORAL BIOPSY SINGLE/MULTIPLE N/A 2017    EGD BIOPSY performed by Jose Armando Jackson MD at [address] [facility]  3/1/16    16 removed on bottom 1 on top     TUBAL LIGATION       Family History   Problem Relation Age of Onset    Diabetes Mother         Passed due to DM at 62    Other Mother         enlarged heart    Dementia Father     Other Father         Brain tumor     Diabetes Maternal Aunt     Diabetes Maternal Uncle     Glaucoma Neg Hx      Social History     Tobacco Use    Smoking status: Former Smoker     Last attempt to quit: 1993     Years since quittin.4    Smokeless tobacco: Never Used    Tobacco comment: cwaltmire rrt 3/12/17   Substance Use Topics    Alcohol use: Yes     Alcohol/week: 0.0 standard drinks     Comment: has about 1 or 2 a year. very rarely      Current Outpatient Medications   Medication Sig Dispense Refill    hyoscyamine (LEVBID) 375 MCG extended release tablet Take 1 tablet by mouth every 12 hours as needed for Cramping 60 tablet 3    MAGNESIUM PO Take by mouth      [place]icap [place]acard [facility] [place] Does not apply route Issue parking placard for persons with disabilities. [facility] sec 4503.44. Expires in 5 years. Applicant meets the qualifying disability criteria.  1 each 0    diclofenac sodium 1 % GEL Apply 2 g topically 4 times daily 3 Tube 3    dicyclomine (BENTYL) 10 MG capsule Take 1 capsule by mouth 4 times daily as needed (Cramps/Abdominal Pain) 120 capsule 3    lisinopril (PRINIVIL;ZESTRIL) 10 MG tablet Take 1 tablet by mouth daily 30 tablet 11    cyclobenzaprine (FLEXERIL) 10 MG tablet Take 1 tablet by mouth 3 times daily as needed for Muscle spasms 90 tablet 11    Multiple Vitamins-Minerals (MULTI-VITAMIN/MINERALS PO) Take 1 tablet by mouth 2 times daily      omeprazole (PRILOSEC) 20 MG delayed release capsule Take 1 capsule by mouth daily 30 capsule 5    Cholecalciferol (VITAMIN D3) 01622 units CAPS Take 1 capsule by mouth daily 30 capsule 5    docusate sodium (COLACE) 100 MG capsule Take 1 capsule by mouth 2 times daily 60 capsule 5     No current facility-administered medications for this visit. Allergies   Allergen Reactions    Latex Swelling     The powder in the gloves    Dilaudid [Hydromorphone Hcl] Nausea And Vomiting    Hydromorphone     Medrol [Methylprednisolone] Other (See Comments)     Back pain    Prednisone Nausea And Vomiting       Health Maintenance   Topic Date Due    Cervical cancer screen  12/01/2016    Annual Wellness Visit (AWV)  10/01/2020    Flu vaccine (1) 09/16/2021 (Originally 9/1/2020)    Shingles Vaccine (1 of 2) 09/16/2021 (Originally 8/9/2008)    Potassium monitoring  10/01/2021    Creatinine monitoring  10/01/2021    Breast cancer screen  10/10/2022    Diabetes screen  09/18/2023    Lipid screen  09/18/2025    DTaP/Tdap/Td vaccine (2 - Td) 04/28/2026    Colon cancer screen colonoscopy  09/29/2030    Hepatitis C screen  Completed    HIV screen  Completed    Hepatitis A vaccine  Aged Out    Hepatitis B vaccine  Aged Out    Hib vaccine  Aged Out    Meningococcal (ACWY) vaccine  Aged Out    Pneumococcal 0-64 years Vaccine  Aged Out       Subjective:      Review of Systems   Cardiovascular: Positive for chest pain and palpitations. Musculoskeletal: Negative for myalgias (fluttering in neck muscles). Objective:     Vitals:    10/12/20 1527   BP: 134/74   Pulse: 92   Temp: 97.6 °F (36.4 °C)   TempSrc: Tympanic   SpO2: 96%   Weight: 174 lb (78.9 kg)   Height: 5' (1.524 m)     Physical Exam  Vitals signs and nursing note reviewed. Constitutional:       General: She is not in acute distress. Appearance: She is well-developed. HENT:      Head: Normocephalic and atraumatic.       Right Ear: Tympanic membrane, ear canal and external ear normal.      Left Ear: Tympanic membrane, ear canal and external ear normal.      Nose: Nose normal.      Mouth/Throat:      Mouth: Mucous membranes are moist.      Pharynx: Oropharynx is clear. No posterior oropharyngeal erythema. Eyes:      Conjunctiva/sclera: Conjunctivae normal.   Neck:      Musculoskeletal: Neck supple. Cardiovascular:      Rate and Rhythm: Normal rate and regular rhythm. Heart sounds: Normal heart sounds. Pulmonary:      Effort: Pulmonary effort is normal. No respiratory distress. Breath sounds: Normal breath sounds. Abdominal:      General: Bowel sounds are normal. There is no distension. Palpations: Abdomen is soft. Tenderness: There is no abdominal tenderness. Skin:     General: Skin is warm and dry. Neurological:      Mental Status: She is alert and oriented to person, place, and time. Assessment:       Diagnosis Orders   1. Chest pain, unspecified type  EKG 12 lead    EKG 12 lead   2. Fluttering muscles           Plan:      Return if symptoms worsen or fail to improve. Orders Placed This Encounter   Procedures    EKG 12 lead     Standing Status:   Future     Number of Occurrences:   1     Standing Expiration Date:   12/11/2020     Order Specific Question:   Reason for Exam?     Answer:   Chest pain     No orders of the defined types were placed in this encounter. Patient given educational materials - see patient instructions. Discussed use, benefit, and side effects of prescribed medications. All patient questions answered. Pt voiced understanding. Reviewed health maintenance.             Electronically signed by Kayden Hinton DO on 10/18/2020 at 11:54 PM

## 2020-11-03 PROBLEM — I10 HYPERTENSION: Status: RESOLVED | Noted: 2020-11-03 | Resolved: 2020-11-03

## 2020-12-07 ENCOUNTER — OFFICE VISIT (OUTPATIENT)
Dept: FAMILY MEDICINE CLINIC | Age: 60
End: 2020-12-07
Payer: MEDICARE

## 2020-12-07 VITALS
HEIGHT: 60 IN | BODY MASS INDEX: 35.14 KG/M2 | SYSTOLIC BLOOD PRESSURE: 132 MMHG | DIASTOLIC BLOOD PRESSURE: 80 MMHG | WEIGHT: 179 LBS | HEART RATE: 85 BPM | TEMPERATURE: 97.9 F | OXYGEN SATURATION: 98 %

## 2020-12-07 PROCEDURE — 1036F TOBACCO NON-USER: CPT | Performed by: FAMILY MEDICINE

## 2020-12-07 PROCEDURE — 99213 OFFICE O/P EST LOW 20 MIN: CPT

## 2020-12-07 PROCEDURE — 99214 OFFICE O/P EST MOD 30 MIN: CPT | Performed by: FAMILY MEDICINE

## 2020-12-07 PROCEDURE — 3017F COLORECTAL CA SCREEN DOC REV: CPT | Performed by: FAMILY MEDICINE

## 2020-12-07 PROCEDURE — G8427 DOCREV CUR MEDS BY ELIG CLIN: HCPCS | Performed by: FAMILY MEDICINE

## 2020-12-07 PROCEDURE — G8417 CALC BMI ABV UP PARAM F/U: HCPCS | Performed by: FAMILY MEDICINE

## 2020-12-07 PROCEDURE — G8484 FLU IMMUNIZE NO ADMIN: HCPCS | Performed by: FAMILY MEDICINE

## 2020-12-07 ASSESSMENT — ENCOUNTER SYMPTOMS
COUGH: 0
BLOOD IN STOOL: 0
SHORTNESS OF BREATH: 0

## 2020-12-07 NOTE — PROGRESS NOTES
[facility]  [address], [address] [place]  [phone]      Alexandria Dewey is a 56 y.o. female who presents today for her medical conditions/complaints as noted below. Alexandria Dewey is c/o of Pre-op Exam (cataract surgery) and Other (left thigh has pain radiating to butocks )      HPI:     Pt here today for pre-op clearance. Pt will be having L eye cataract removal with IOL by Dr. Obed Clancy on . Will have the R done at a future date - unsure at this time when this will be. No h/o anesthesia reaction. No family h/o malignant hyperthermia. H/o blood transfusions in the past for anemia, but has not had one since . No h/o MI, CHF, cardiac arrhythmia, DVT/PE, renal failure, or DM. Pt had CVA in ; also had TIA's in  - no issues since that time. Functional capacity >4 METS. Will take Lisinopril ---    Pt has been having pain down L buttock/leg - tearing, splitting pain. Has had this before, and in other areas. Using heating pad as needed, which does help somewhat.         Past Medical History:   Diagnosis Date    Anxiety     Asthma     Cerebral artery occlusion with cerebral infarction [facility])     Cerebrovascular disease     stroke     Depression     Diverticulitis     Fibromyalgia     History of small bowel obstruction     Hypertension     [facility]upus ([facility] [address]     Seizures ([facility] [address]  and       Past Surgical History:   Procedure Laterality Date    APPENDECTOMY       SECTION      x 4    COLONOSCOPY  2007    nl    COLONOSCOPY  2015    1 bx    COLONOSCOPY N/A 2020    COLONOSCOPY performed by Michelle Burgess [facility] at [facility]  2009    TAHBSO due to fibroids MMR    OTHER SURGICAL HISTORY Left 2016    C6 TFE    ME EGD TRANSORAL BIOPSY SINGLE/MULTIPLE N/A 2017    EGD BIOPSY performed by Michelle Burgess MD at [address] [facility] medications for this visit. Allergies   Allergen Reactions    Latex Swelling     The powder in the gloves    Dilaudid [Hydromorphone Hcl] Nausea And Vomiting    Hydromorphone     Medrol [Methylprednisolone] Other (See Comments)     Back pain    Prednisone Nausea And Vomiting       Health Maintenance   Topic Date Due    Cervical cancer screen  12/01/2016    Annual Wellness Visit (AWV)  10/01/2020    Flu vaccine (1) 09/16/2021 (Originally 9/1/2020)    Shingles Vaccine (1 of 2) 09/16/2021 (Originally 8/9/2008)    Potassium monitoring  10/01/2021    Creatinine monitoring  10/01/2021    Breast cancer screen  10/10/2022    Diabetes screen  09/18/2023    Lipid screen  09/18/2025    DTaP/Tdap/Td vaccine (2 - Td) 04/28/2026    Colon cancer screen colonoscopy  09/29/2030    Hepatitis C screen  Completed    HIV screen  Completed    Hepatitis A vaccine  Aged Out    Hepatitis B vaccine  Aged Out    Hib vaccine  Aged Out    Meningococcal (ACWY) vaccine  Aged Out    Pneumococcal 0-64 years Vaccine  Aged Out       Subjective:      Review of Systems   Constitutional: Negative for fever. Respiratory: Negative for cough and shortness of breath. Cardiovascular: Negative for chest pain (only muscle spasms in between her ribs - has not had recently) and palpitations. Gastrointestinal: Negative for blood in stool. Genitourinary: Negative for dysuria and hematuria. Musculoskeletal: Positive for myalgias (LLE - buttock to back of knee). Skin: Negative for rash and wound. Neurological: Negative for dizziness and light-headedness. Objective:     Vitals:    12/07/20 1551   BP: 132/80   Site: Right Upper Arm   Position: Sitting   Cuff Size: Medium Adult   Pulse: 85   Temp: 97.9 °F (36.6 °C)   SpO2: 98%   Weight: 179 lb (81.2 kg)   Height: 5' (1.524 m)     Physical Exam  Vitals signs and nursing note reviewed. Constitutional:       General: She is not in acute distress.      Appearance: She is well-developed. HENT:      Head: Normocephalic and atraumatic. Right Ear: Tympanic membrane, ear canal and external ear normal.      Left Ear: Tympanic membrane, ear canal and external ear normal.      Nose: Nose normal.      Mouth/Throat:      Mouth: Mucous membranes are moist.      Pharynx: Oropharynx is clear. No posterior oropharyngeal erythema. Eyes:      Conjunctiva/sclera: Conjunctivae normal.   Neck:      Musculoskeletal: Neck supple. Cardiovascular:      Rate and Rhythm: Normal rate and regular rhythm. Heart sounds: Normal heart sounds. Pulmonary:      Effort: Pulmonary effort is normal. No respiratory distress. Breath sounds: Normal breath sounds. Abdominal:      General: Bowel sounds are normal. There is no distension. Palpations: Abdomen is soft. Tenderness: There is no abdominal tenderness. Skin:     General: Skin is warm and dry. Neurological:      Mental Status: She is alert and oriented to person, place, and time. Assessment:       Diagnosis Orders   1. Pre-op examination     2. Cataract of both eyes, unspecified cataract type     3. Weight gain     4. Essential hypertension           Plan:      Return for previously scheduled visit next month for pap/well woman. No orders of the defined types were placed in this encounter. No orders of the defined types were placed in this encounter. Patient given educational materials - see patient instructions. Discussed use, benefit, and side effects of prescribed medications. All patient questions answered. Pt voiced understanding. Reviewed health maintenance.             Electronically signed by Jeffrey Acuna DO on 12/14/2020 at 12:32 AM

## 2021-01-07 ENCOUNTER — OFFICE VISIT (OUTPATIENT)
Dept: OPHTHALMOLOGY | Age: 61
End: 2021-01-07
Payer: MEDICARE

## 2021-01-07 DIAGNOSIS — H25.13 AGE-RELATED NUCLEAR CATARACT OF BOTH EYES: Primary | ICD-10-CM

## 2021-01-07 DIAGNOSIS — H40.003 GLAUCOMA SUSPECT OF BOTH EYES: ICD-10-CM

## 2021-01-07 DIAGNOSIS — H16.223 KERATOCONJUNCTIVITIS SICCA OF BOTH EYES NOT SPECIFIED AS SJOGREN'S: ICD-10-CM

## 2021-01-07 PROCEDURE — 1036F TOBACCO NON-USER: CPT | Performed by: OPHTHALMOLOGY

## 2021-01-07 PROCEDURE — G8417 CALC BMI ABV UP PARAM F/U: HCPCS | Performed by: OPHTHALMOLOGY

## 2021-01-07 PROCEDURE — G8427 DOCREV CUR MEDS BY ELIG CLIN: HCPCS | Performed by: OPHTHALMOLOGY

## 2021-01-07 PROCEDURE — 99204 OFFICE O/P NEW MOD 45 MIN: CPT | Performed by: OPHTHALMOLOGY

## 2021-01-07 PROCEDURE — 99203 OFFICE O/P NEW LOW 30 MIN: CPT

## 2021-01-07 PROCEDURE — G8484 FLU IMMUNIZE NO ADMIN: HCPCS | Performed by: OPHTHALMOLOGY

## 2021-01-07 PROCEDURE — 3017F COLORECTAL CA SCREEN DOC REV: CPT | Performed by: OPHTHALMOLOGY

## 2021-01-07 ASSESSMENT — REFRACTION_MANIFEST
METHOD_AUTOREFRACTION: 1
OD_SPHERE: +0.75
OS_SPHERE: +1.00
OS_CYLINDER: -0.50
OD_AXIS: 081
OD_CYLINDER: -0.50
OS_AXIS: 112

## 2021-01-07 ASSESSMENT — CONF VISUAL FIELD
METHOD: COUNTING FINGERS
OD_NORMAL: 1
OS_NORMAL: 1

## 2021-01-07 ASSESSMENT — KERATOMETRY
OD_AXISANGLE2_DEGREES: 063
OD_K1POWER_DIOPTERS: 43.50
OS_AXISANGLE2_DEGREES: 114
OD_AXISANGLE_DEGREES: 153
OS_K2POWER_DIOPTERS: 44.75
OS_AXISANGLE_DEGREES: 024
OS_K1POWER_DIOPTERS: 44.00
OD_K2POWER_DIOPTERS: 44.00
METHOD_AUTO_MANUAL: AUTOMATED

## 2021-01-07 ASSESSMENT — ENCOUNTER SYMPTOMS: EYES NEGATIVE: 1

## 2021-01-07 ASSESSMENT — VISUAL ACUITY
CORRECTION_TYPE: GLASSES
METHOD: SNELLEN - LINEAR
OS_CC: 20/50
OD_CC+: -3
OS_CC+: -1

## 2021-01-07 ASSESSMENT — TONOMETRY
IOP_METHOD: TONOPEN
OS_IOP_MMHG: 18
OD_IOP_MMHG: 15

## 2021-01-07 ASSESSMENT — SLIT LAMP EXAM - LIDS
COMMENTS: NORMAL
COMMENTS: NORMAL

## 2021-01-07 NOTE — PROGRESS NOTES
Yuly Nunez is a 56 y.o. female here for a complete eye exam.      Chief Complaint   Patient presents with    Cataract    Blurred Vision       HPI     Cataract     In both eyes. Blurred Vision     In both eyes. Vision is blurred. Context:  distance vision and reading. Comments     Patient is here today for a cataract evaluation  States she saw Dr. Eunice Hernadez and was set up for surgery but due to insurance she is not able to have it done at [facility]. Vision is poor in both eyes, left eye seems worse than right eye. Vision is cloudy, glasses seem dirty constantly but it is not the glasses. Both eyes are very dry, mornings are always much worse.  She has drops already picked up, hoping she can use these (Lotamax SM - Administer 1 drop into the left eye 3 times a day for 28 days and Prolensa -Administer 1 drop into the left eye daily for 31 days. )                Review of Systems  ROS     Positive for: Eyes    Negative for: Constitutional, Neurological, Skin, Genitourinary, Musculoskeletal, Cardiovascular          Main Ophthalmology Exam     External Exam       Right Left    External Normal Normal          Slit Lamp Exam       Right Left    Lids/Lashes Normal Normal    Conjunctiva/Sclera White and quiet White and quiet    Cornea Low tear bower Low tear lake    Anterior Chamber Deep and quiet Deep and quiet    Iris Round and reactive Round and reactive    Lens 2+ Nuclear sclerosis, 3+ Cortical cataract 2+ Nuclear sclerosis, 3+ Cortical cataract    Vitreous Normal Normal          Fundus Exam       Right Left    Disc Does not respect ISNT Does not respect ISNT    C/D Ratio 0.7 0.6    Macula Normal Normal    Vessels Normal Normal    Periphery Normal Normal                   Tonometry     Tonometry (Tonopen, 9:14 AM)       Right Left    Pressure 15 18              Visual Acuity     Visual Acuity (Snellen - Linear)       Right Left    Dist cc 20/40 -3 20/50 -1    Dist ph cc NI NI Correction: Glasses              Pupils     Pupils       Pupils APD    Right PERRL None    Left PERRL None               Confrontational Visual Fields     Visual Fields (Counting fingers)       Right Left     Full Full               Extraocular Movement     Extraocular Movement       Right Left     Full, Ortho Full, Ortho              Keratometry     Keratometry (Automated)       K1 Axis K2 Axis    Right 43.50 063 44.00 153    Left 44.00 114 44.75 024                Past Medical History:   Diagnosis Date    Anxiety     Asthma     Cerebral artery occlusion with cerebral infarction [facility]) 2009    Cerebrovascular disease     stroke 1998    Depression     Diverticulitis     Fibromyalgia     History of small bowel obstruction     Hypertension     Lupus ([facility] [address]     Seizures ([facility] [address] 2005 and 2006          Current Outpatient Medications:     hyoscyamine (LEVBID) 375 MCG extended release tablet, Take 1 tablet by mouth every 12 hours as needed for Cramping, Disp: 60 tablet, Rfl: 3    MAGNESIUM PO, Take by mouth, Disp: , Rfl:     Handicap Placard MISC, by Does not apply route Issue parking placard for persons with disabilities. [facility] sec 4503.44. Expires in 5 years.   Applicant meets the qualifying disability criteria., Disp: 1 each, Rfl: 0    diclofenac sodium 1 % GEL, Apply 2 g topically 4 times daily, Disp: 3 Tube, Rfl: 3    dicyclomine (BENTYL) 10 MG capsule, Take 1 capsule by mouth 4 times daily as needed (Cramps/Abdominal Pain), Disp: 120 capsule, Rfl: 3    lisinopril (PRINIVIL;ZESTRIL) 10 MG tablet, Take 1 tablet by mouth daily, Disp: 30 tablet, Rfl: 11    cyclobenzaprine (FLEXERIL) 10 MG tablet, Take 1 tablet by mouth 3 times daily as needed for Muscle spasms, Disp: 90 tablet, Rfl: 11    Multiple Vitamins-Minerals (MULTI-VITAMIN/MINERALS PO), Take 1 tablet by mouth 2 times daily, Disp: , Rfl:     omeprazole (PRILOSEC) 20 MG delayed release capsule, Take 1 capsule by mouth daily, Disp: 30 capsule, Rfl: 5   Cholecalciferol (VITAMIN D3) 19305 units CAPS, Take 1 capsule by mouth daily, Disp: 30 capsule, Rfl: 5    docusate sodium (COLACE) 100 MG capsule, Take 1 capsule by mouth 2 times daily, Disp: 60 capsule, Rfl: 5     Allergies   Allergen Reactions    Latex Swelling     The powder in the gloves    Dilaudid [Hydromorphone Hcl] Nausea And Vomiting    Hydromorphone     Medrol [Methylprednisolone] Other (See Comments)     Back pain    Prednisone Nausea And Vomiting        IMPRESSION:  1. Age-related nuclear cataract of both eyes    2. Keratoconjunctivitis sicca of both eyes not specified as Sjogren's    3. Glaucoma suspect of both eyes        PLAN:    1. Cataract visually significant and affecting ADLs including driving at night. RBA informed consent obtained for cataract extraction with intraocular lens left eye than right eye. Patient expressed desire to proceed nuances of lens choices discussed with patient. Surgical considerations  Modest density, primarily cortical  DNT OU, mild hyperopia  Patient is mildly anxious about MAC anesthesia  May use Lotemax and Prolensa from Dr. Tenzin Lopez    2. Artificial tears 3-4 times a day as needed  3. Loss, suspect based on optic nerve appearance and -American race. IOP not elevated and not on drops. Negative family history. Patient expresses possible \"diagnosis of glaucoma\" when seen in [place] many years ago. OCT optic nerve poor in the right normal on the left. Repeat OCT optic nerve and obtain baseline visual field after cataract surgery. Hold on drops for now. Return biometry then CE IOL OS then OD.     Electronically signed by Stephanie Murphy MD on 1/7/2021 at 10:51 AM    (Please note that portions of this note were completed with a voice recognition program. Efforts were made to edit the dictations but occasionally words are mis-transcribed.)

## 2021-01-07 NOTE — PATIENT INSTRUCTIONS
Lotamax SM - Administer 1 drop into the surgery eye 3 times a day for 28 days  Prolensa -Administer 1 drop into the surgery eye daily for 31 days.

## 2021-02-03 ENCOUNTER — TELEPHONE (OUTPATIENT)
Dept: OPHTHALMOLOGY | Age: 61
End: 2021-02-03

## 2021-02-18 ENCOUNTER — HOSPITAL ENCOUNTER (EMERGENCY)
Age: 61
Discharge: HOME | End: 2021-02-18
Attending: EMERGENCY MEDICINE
Payer: MEDICARE

## 2021-02-18 VITALS
SYSTOLIC BLOOD PRESSURE: 152 MMHG | WEIGHT: 170 LBS | HEIGHT: 60 IN | BODY MASS INDEX: 33.38 KG/M2 | TEMPERATURE: 97.6 F | OXYGEN SATURATION: 96 % | DIASTOLIC BLOOD PRESSURE: 80 MMHG | RESPIRATION RATE: 16 BRPM | HEART RATE: 72 BPM

## 2021-02-18 DIAGNOSIS — R53.1 WEAKNESS GENERALIZED: Primary | ICD-10-CM

## 2021-02-18 LAB
-: ABNORMAL
ABSOLUTE EOS #: 0.4 K/UL (ref 0–0.44)
ABSOLUTE IMMATURE GRANULOCYTE: 0.04 K/UL (ref 0–0.3)
ABSOLUTE LYMPH #: 1.82 K/UL (ref 1.1–3.7)
ABSOLUTE MONO #: 0.84 K/UL (ref 0.1–1.2)
ALBUMIN SERPL-MCNC: 4.2 G/DL (ref 3.5–5.2)
ALBUMIN/GLOBULIN RATIO: 1.1 (ref 1–2.5)
ALP BLD-CCNC: 77 U/L (ref 35–104)
ALT SERPL-CCNC: <5 U/L (ref 5–33)
AMORPHOUS: ABNORMAL
ANION GAP SERPL CALCULATED.3IONS-SCNC: 11 MMOL/L (ref 9–17)
AST SERPL-CCNC: 18 U/L
BACTERIA: ABNORMAL
BASOPHILS # BLD: 0 % (ref 0–2)
BASOPHILS ABSOLUTE: <0.03 K/UL (ref 0–0.2)
BILIRUB SERPL-MCNC: 0.21 MG/DL (ref 0.3–1.2)
BILIRUBIN DIRECT: <0.08 MG/DL
BILIRUBIN URINE: NEGATIVE
BILIRUBIN, INDIRECT: ABNORMAL MG/DL (ref 0–1)
BNP INTERPRETATION: NORMAL
BUN BLDV-MCNC: 8 MG/DL (ref 8–23)
BUN/CREAT BLD: 10 (ref 9–20)
CALCIUM SERPL-MCNC: 9.6 MG/DL (ref 8.6–10.4)
CASTS UA: ABNORMAL /LPF (ref 0–2)
CHLORIDE BLD-SCNC: 103 MMOL/L (ref 98–107)
CO2: 25 MMOL/L (ref 20–31)
COLOR: NORMAL
COMMENT UA: NORMAL
CREAT SERPL-MCNC: 0.8 MG/DL (ref 0.5–0.9)
CRYSTALS, UA: ABNORMAL /HPF
DIFFERENTIAL TYPE: ABNORMAL
EOSINOPHILS RELATIVE PERCENT: 5 % (ref 1–4)
EPITHELIAL CELLS UA: ABNORMAL /HPF (ref 0–5)
GFR AFRICAN AMERICAN: >60 ML/MIN
GFR NON-AFRICAN AMERICAN: >60 ML/MIN
GFR SERPL CREATININE-BSD FRML MDRD: NORMAL ML/MIN/{1.73_M2}
GFR SERPL CREATININE-BSD FRML MDRD: NORMAL ML/MIN/{1.73_M2}
GLOBULIN: 3.9 G/DL (ref 1.5–3.8)
GLUCOSE BLD-MCNC: 82 MG/DL (ref 70–99)
GLUCOSE URINE: NEGATIVE
HCT VFR BLD CALC: 36.1 % (ref 36.3–47.1)
HEMOGLOBIN: 11.4 G/DL (ref 11.9–15.1)
IMMATURE GRANULOCYTES: 1 %
KETONES, URINE: NEGATIVE
LEUKOCYTE ESTERASE, URINE: NEGATIVE
LYMPHOCYTES # BLD: 21 % (ref 24–43)
MAGNESIUM: 2 MG/DL (ref 1.6–2.6)
MCH RBC QN AUTO: 27 PG (ref 25.2–33.5)
MCHC RBC AUTO-ENTMCNC: 31.6 G/DL (ref 25.2–33.5)
MCV RBC AUTO: 85.5 FL (ref 82.6–102.9)
MONOCYTES # BLD: 10 % (ref 3–12)
MUCUS: ABNORMAL
NITRITE, URINE: NEGATIVE
NRBC AUTOMATED: 0 PER 100 WBC
OTHER OBSERVATIONS UA: ABNORMAL
PDW BLD-RTO: 14 % (ref 11.8–14.4)
PH UA: 6 (ref 5–6)
PLATELET # BLD: 395 K/UL (ref 138–453)
PLATELET ESTIMATE: ABNORMAL
PMV BLD AUTO: 9.5 FL (ref 8.1–13.5)
POTASSIUM SERPL-SCNC: 4.2 MMOL/L (ref 3.7–5.3)
PRO-BNP: 37 PG/ML
PROTEIN UA: NEGATIVE
RBC # BLD: 4.22 M/UL (ref 3.95–5.11)
RBC # BLD: ABNORMAL 10*6/UL
RBC UA: ABNORMAL /HPF (ref 0–4)
RENAL EPITHELIAL, UA: ABNORMAL /HPF
SEG NEUTROPHILS: 63 % (ref 36–65)
SEGMENTED NEUTROPHILS ABSOLUTE COUNT: 5.52 K/UL (ref 1.5–8.1)
SODIUM BLD-SCNC: 139 MMOL/L (ref 135–144)
SPECIFIC GRAVITY UA: 1.02 (ref 1.01–1.02)
TOTAL PROTEIN: 8.1 G/DL (ref 6.4–8.3)
TRICHOMONAS: ABNORMAL
TURBIDITY: NORMAL
URINE HGB: NEGATIVE
UROBILINOGEN, URINE: NORMAL
WBC # BLD: 8.6 K/UL (ref 3.5–11.3)
WBC # BLD: ABNORMAL 10*3/UL
WBC UA: ABNORMAL /HPF (ref 0–4)
YEAST: ABNORMAL

## 2021-02-18 PROCEDURE — 83735 ASSAY OF MAGNESIUM: CPT

## 2021-02-18 PROCEDURE — 36415 COLL VENOUS BLD VENIPUNCTURE: CPT

## 2021-02-18 PROCEDURE — 83880 ASSAY OF NATRIURETIC PEPTIDE: CPT

## 2021-02-18 PROCEDURE — 85025 COMPLETE CBC W/AUTO DIFF WBC: CPT

## 2021-02-18 PROCEDURE — 99285 EMERGENCY DEPT VISIT HI MDM: CPT

## 2021-02-18 PROCEDURE — 80076 HEPATIC FUNCTION PANEL: CPT

## 2021-02-18 PROCEDURE — 80048 BASIC METABOLIC PNL TOTAL CA: CPT

## 2021-02-18 PROCEDURE — 81001 URINALYSIS AUTO W/SCOPE: CPT

## 2021-02-18 NOTE — ED TRIAGE NOTES
Patient arrived via EMS for c/o numbness/tingling \"all over my body\" that localized to bilateral feet and bilateral shoulders. Pt also reports feeling \"blackness and inability to open my eyes\" lasting approximately 10 minutes. Pt changed into gown, hooked up to monitor, IV placed, labs drawn/sent. Will continue to monitor.        Paty Haro RN

## 2021-02-18 NOTE — ED PROVIDER NOTES
[facility] ED  EMERGENCY DEPARTMENT ENCOUNTER      Pt Name: Abigail Velazquez  MRN: [id number]  [place] 1960  Date of evaluation: 2021  Provider: Nakul Brito MD    [address] [place] [address]     Chief Complaint   Patient presents with    Numbness     pt complains of numbness and tingling \"all over\" 20 minutes after 1300, pt states, \" everything got dark, I could barely move or open my eyes\" episode happened while sitting down reading. pt denies injury/trauma. denies LOC.  Tingling         HISTORY OF PRESENT ILLNESS   (Location/Symptom, Timing/Onset, Context/Setting,Quality, Duration, Modifying Factors, Severity)  Note limiting factors. Abigail Velazquez is a 56 y.o. female who presents to the emergency department with a chief complaint of for a 20-minute episode around 1 PM when she felt weak all over with numbness and tingling and some discomfort in the left shoulder. Symptoms have pretty much resolved. Patient states that she is prone to aches and pains and has a history of fibromyalgia and lupus. HPI    Nursing Notes werereviewed. REVIEW OF SYSTEMS    (2-9 systems for level 4, 10 or more for level 5)     Review of Systems    Except as noted above the remainder of the review of systems was reviewed and negative.        PAST MEDICAL HISTORY     Past Medical History:   Diagnosis Date    Anxiety     Asthma     Cerebral artery occlusion with cerebral infarction [facility]) 2009    Cerebrovascular disease     stroke 1998    Depression     Diverticulitis     Fibromyalgia     History of small bowel obstruction     Hypertension     Lupus ([address]     Seizures ([address] 2005 and 2006         SURGICALHISTORY       Past Surgical History:   Procedure Laterality Date    APPENDECTOMY       SECTION      x 4    COLONOSCOPY  2007    nl    COLONOSCOPY  2015    1 bx    COLONOSCOPY N/A 2020    COLONOSCOPY performed by [facility]orro [facility] at [facility] HYSTERECTOMY  2009    TAHBSO due to fibroids MMR    OTHER SURGICAL HISTORY Left 12/30/2016    C6 TFE    ND EGD TRANSORAL BIOPSY SINGLE/MULTIPLE N/A 4/13/2017    EGD BIOPSY performed by Angel Fisher MD at [address] [facility]  3/1/16    16 removed on bottom 1 on top     TUBAL LIGATION           CURRENT MEDICATIONS       Previous Medications    CHOLECALCIFEROL (VITAMIN D3) 80474 UNITS CAPS    Take 1 capsule by mouth daily    CYCLOBENZAPRINE (FLEXERIL) 10 MG TABLET    Take 1 tablet by mouth 3 times daily as needed for Muscle spasms    DICLOFENAC SODIUM 1 % GEL    Apply 2 g topically 4 times daily    DICYCLOMINE (BENTYL) 10 MG CAPSULE    Take 1 capsule by mouth 4 times daily as needed (Cramps/Abdominal Pain)    DOCUSATE SODIUM (COLACE) 100 MG CAPSULE    Take 1 capsule by mouth 2 times daily    HANDICAP PLACARD MISC    by Does not apply route Issue parking placard for persons with disabilities. [facility] sec 4503.44. Expires in 5 years. Applicant meets the qualifying disability criteria.     HYOSCYAMINE (LEVBID) 375 MCG EXTENDED RELEASE TABLET    Take 1 tablet by mouth every 12 hours as needed for Cramping    LISINOPRIL (PRINIVIL;ZESTRIL) 10 MG TABLET    Take 1 tablet by mouth daily    MAGNESIUM PO    Take by mouth    MULTIPLE VITAMINS-MINERALS (MULTI-VITAMIN/MINERALS PO)    Take 1 tablet by mouth 2 times daily    OMEPRAZOLE (PRILOSEC) 20 MG DELAYED RELEASE CAPSULE    Take 1 capsule by mouth daily       ALLERGIES     Latex, Dilaudid [hydromorphone hcl], Hydromorphone, Medrol [methylprednisolone], and Prednisone    FAMILY HISTORY       Family History   Problem Relation Age of Onset    Diabetes Mother         Passed due to DM at 62    Other Mother         enlarged heart    Dementia Father     Other Father         Brain tumor     Diabetes Maternal Aunt     Diabetes Maternal Uncle     Glaucoma Neg Hx           SOCIAL HISTORY       Social History     Socioeconomic History    Marital status: Legally  Spouse name: None    Number of children: None    Years of education: None    Highest education level: None   Occupational History    None   Social Needs    Financial resource strain: None    Food insecurity     Worry: None     Inability: None    Transportation needs     Medical: None     Non-medical: None   Tobacco Use    Smoking status: Former Smoker     Quit date: 1993     Years since quittin.8    Smokeless tobacco: Never Used    Tobacco comment: robinsonalkaitlynn rrt 3/12/17   Substance and Sexual Activity    Alcohol use: Yes     Alcohol/week: 0.0 standard drinks     Comment: has about 1 or 2 a year. very rarely    Drug use: No    Sexual activity: Never     Comment: not for two years   Lifestyle    Physical activity     Days per week: None     Minutes per session: None    Stress: None   Relationships    Social connections     Talks on phone: None     Gets together: None     Attends Judaism service: None     Active member of club or organization: None     Attends meetings of clubs or organizations: None     Relationship status: None    Intimate partner violence     Fear of current or ex partner: None     Emotionally abused: None     Physically abused: None     Forced sexual activity: None   Other Topics Concern    None   Social History Narrative    None       SCREENINGS   NIH Stroke Scale  Interval: Baseline  Level of Consciousness (1a. ): Alert  LOC Questions (1b. ):  Answers both correctly  LOC Commands (1c. ): Performs both tasks correctly  Best Gaze (2. ): Normal  Visual (3. ): No visual loss  Facial Palsy (4. ): Normal symmetrical movement  Motor Arm, Left (5a. ): No drift  Motor Arm, Right (5b. ): No drift  Motor Leg, Left (6a. ): No drift  Motor Leg, Right (6b. ): No drift  Limb Ataxia (7. ): Absent  Sensory (8. ): (!) Mild to Moderate  Best Language (9. ): No aphasia  Dysarthria (10. ): Normal  Extinction and Inattention (11): No abnormality  Total: 1Glasgow Coma Scale  Eye Opening: Spontaneous  Best Verbal Response: Oriented  Best Motor Response: Obeys commands  Caroline Coma Scale Score: 15        PHYSICAL EXAM    (up to 7 for level 4, 8 or more for level 5)     ED Triage Vitals [02/18/21 1437]   BP Temp Temp Source Pulse Resp SpO2 Height Weight   (!) 148/94 98.5 °F (36.9 °C) Tympanic 103 22 96 % 5' (1.524 m) 170 lb (77.1 kg)       Physical Exam    DIAGNOSTIC RESULTS     EKG: All EKG's are interpreted by the Emergency Department Physician who either signs orCo-signs this chart in the absence of a cardiologist.    RADIOLOGY:   Non-plain film images such as CT, Ultrasound and MRI are read by the radiologist. Plain radiographic images are visualized and preliminarily interpreted by the emergency physician with the below findings:    Interpretation per the Radiologist below, ifavailable at the time of this note:    No orders to display         ED BEDSIDE ULTRASOUND:   Performed by ED Physician - none    LABS:  Labs Reviewed   CBC WITH AUTO DIFFERENTIAL - Abnormal; Notable for the following components:       Result Value    Hemoglobin 11.4 (*)     Hematocrit 36.1 (*)     Lymphocytes 21 (*)     Eosinophils % 5 (*)     Immature Granulocytes 1 (*)     All other components within normal limits   HEPATIC FUNCTION PANEL - Abnormal; Notable for the following components:    ALT <5 (*)     Total Bilirubin 0.21 (*)     Globulin 3.9 (*)     All other components within normal limits   MICROSCOPIC URINALYSIS - Abnormal; Notable for the following components:    Bacteria, UA TRACE (*)     All other components within normal limits   BASIC METABOLIC PANEL   BRAIN NATRIURETIC PEPTIDE   MAGNESIUM   URINALYSIS       All other labs were within normal range ornot returned as of this dictation.     EMERGENCY DEPARTMENT COURSE and DIFFERENTIAL DIAGNOSIS/MDM:   Vitals:    Vitals:    02/18/21 1437 02/18/21 1445 02/18/21 1500 02/18/21 1515   BP: (!) 148/94 (!) 153/93 (!) 148/88 (!) 148/87   Pulse: 103   78   Resp: 22 16   Temp: 98.5 °F (36.9 °C)      TempSrc: Tympanic      SpO2: 96%   95%   Weight: 170 lb (77.1 kg)      Height: 5' (1.524 m)          ED Course as of Feb 18 1701   Thu Feb 18, 2021   1700 Patient's baseline work-up was essentially benign. She states that she feels much better now. She complains of a generalized headache which she experiences frequently and plans to take Goody headache powder when she gets home. She is stable for discharge at this time.    [SH]      ED Course User Index  [SH] Elba Carter MD       MDM    CONSULTS:  None    PROCEDURES:  Unlessotherwise noted below, none     Procedures    FINAL IMPRESSION      1. Weakness generalized          DISPOSITION/PLAN   DISPOSITION Decision To Discharge 02/18/2021 04:59:57 PM      PATIENT REFERRED TO:  DO [address]  [place] [address]  [phone]            DISCHARGE MEDICATIONS:         Problem List:  Patient Active Problem List   Diagnosis Code    Hyperopia of both eyes with astigmatism and presbyopia H52.03, H52.203, H52.4    Cortical cataract of left eye H26.9    Lupus (HCC) M32.9    Fibromyalgia M79.7    Anxiety F41.9    Depression F32.9    IBS (irritable bowel syndrome) K58.9    Anemia D64.9    Chest pain R07.9    SBO (small bowel obstruction) ([facility]) K56.609    Right hip pain M25.551    Neck pain M54.2    Cervical radicular pain M54.12    Left upper quadrant pain R10.12    Epigastric pain R10.13    Facet arthritis of cervical region M47.812    Chronic left shoulder pain M25.512, G89.29    Encounter for chronic pain management G89.29    Vitamin D deficiency E55.9    Near syncope R55    Primary insomnia F51.01    Pain in joint M25.50    Open angle with borderline findings and low glaucoma risk in both eyes H40.013    Ocular migraine G43. 109    Leukocytosis D72.829    Keratitis H16.9    Joint stiffness M25.60    Dry eye syndrome of both lacrimal glands H04.123    Diverticulitis of colon K57.32    Benign essential hypertension I10    Adjustment disorder with anxious mood F43.22    Osteoarthritis of hip, unspecified M16.9    Diarrhea R19.7    Lower abdominal pain R10.30           Summation      Patient Course: Discharged. ED Medicationsadministered this visit:  Medications - No data to display    New Prescriptions from this visit:    New Prescriptions    No medications on file       Follow-up:  Kavitha Faye DO  [address] [facility]  [phone]              Final Impression:   1.  Weakness generalized               (Please note that portions of this note were completed with a voice recognitionprogram.  Efforts were made to edit the dictations but occasionally words are mis-transcribed.)    Shanel Prather MD (electronically signed)  Attending Emergency Physician            Shanel Prather MD  02/18/21 9565

## 2021-06-03 DIAGNOSIS — R10.13 EPIGASTRIC PAIN: ICD-10-CM

## 2021-06-03 DIAGNOSIS — K58.2 IRRITABLE BOWEL SYNDROME WITH BOTH CONSTIPATION AND DIARRHEA: ICD-10-CM

## 2021-06-03 NOTE — TELEPHONE ENCOUNTER
Last Appt:  12/7/2020  Next Appt:   Visit date not found  Med verified in Epic    Patient calling stating she needs bentyl asap, aware that dr. Destiney Morris is not in today. Patient has not been seen with Dr. Steven Keith since 12/2020 and has a comment on her chart that states pt transferred PCP to juan [facility].

## 2021-06-04 RX ORDER — DICYCLOMINE HYDROCHLORIDE 10 MG/1
10 CAPSULE ORAL 4 TIMES DAILY PRN
Qty: 120 CAPSULE | Refills: 2 | Status: SHIPPED | OUTPATIENT
Start: 2021-06-04 | End: 2022-10-24 | Stop reason: SDUPTHER

## 2021-06-04 NOTE — TELEPHONE ENCOUNTER
Removed comment. Pt re-established back here 9-16-20 after seeing someone in [place] from [phone].

## 2021-06-23 DIAGNOSIS — I10 ESSENTIAL HYPERTENSION, BENIGN: ICD-10-CM

## 2021-06-25 RX ORDER — LISINOPRIL 10 MG/1
10 TABLET ORAL DAILY
Qty: 30 TABLET | Refills: 0 | Status: SHIPPED | OUTPATIENT
Start: 2021-06-25 | End: 2021-07-23

## 2021-06-25 NOTE — TELEPHONE ENCOUNTER
This med has not been filled by me since 11/2018; it also does not appear that she has filled it since 10/2020 per dispense report. Has she been taking this daily?

## 2021-07-04 ENCOUNTER — HOSPITAL ENCOUNTER (EMERGENCY)
Age: 61
Discharge: HOME | End: 2021-07-04
Attending: EMERGENCY MEDICINE
Payer: MEDICARE

## 2021-07-04 ENCOUNTER — APPOINTMENT (OUTPATIENT)
Dept: INTERVENTIONAL RADIOLOGY/VASCULAR | Age: 61
End: 2021-07-04
Payer: MEDICARE

## 2021-07-04 VITALS
HEIGHT: 60 IN | HEART RATE: 94 BPM | DIASTOLIC BLOOD PRESSURE: 92 MMHG | RESPIRATION RATE: 16 BRPM | BODY MASS INDEX: 33.38 KG/M2 | TEMPERATURE: 98.9 F | OXYGEN SATURATION: 98 % | SYSTOLIC BLOOD PRESSURE: 172 MMHG | WEIGHT: 170 LBS

## 2021-07-04 DIAGNOSIS — M79.604 RIGHT LEG PAIN: Primary | ICD-10-CM

## 2021-07-04 LAB
ABSOLUTE EOS #: 0.37 K/UL (ref 0–0.44)
ABSOLUTE IMMATURE GRANULOCYTE: 0.03 K/UL (ref 0–0.3)
ABSOLUTE LYMPH #: 2.42 K/UL (ref 1.1–3.7)
ABSOLUTE MONO #: 0.8 K/UL (ref 0.1–1.2)
ANION GAP SERPL CALCULATED.3IONS-SCNC: 10 MMOL/L (ref 9–17)
BASOPHILS # BLD: 0 % (ref 0–2)
BASOPHILS ABSOLUTE: <0.03 K/UL (ref 0–0.2)
BUN BLDV-MCNC: 10 MG/DL (ref 8–23)
BUN/CREAT BLD: 15 (ref 9–20)
CALCIUM SERPL-MCNC: 9 MG/DL (ref 8.6–10.4)
CHLORIDE BLD-SCNC: 104 MMOL/L (ref 98–107)
CO2: 24 MMOL/L (ref 20–31)
CREAT SERPL-MCNC: 0.66 MG/DL (ref 0.5–0.9)
DIFFERENTIAL TYPE: ABNORMAL
EOSINOPHILS RELATIVE PERCENT: 4 % (ref 1–4)
GFR AFRICAN AMERICAN: >60 ML/MIN
GFR NON-AFRICAN AMERICAN: >60 ML/MIN
GFR SERPL CREATININE-BSD FRML MDRD: ABNORMAL ML/MIN/{1.73_M2}
GFR SERPL CREATININE-BSD FRML MDRD: ABNORMAL ML/MIN/{1.73_M2}
GLUCOSE BLD-MCNC: 84 MG/DL (ref 70–99)
HCT VFR BLD CALC: 32.4 % (ref 36.3–47.1)
HEMOGLOBIN: 10 G/DL (ref 11.9–15.1)
IMMATURE GRANULOCYTES: 0 %
LYMPHOCYTES # BLD: 27 % (ref 24–43)
MCH RBC QN AUTO: 24.9 PG (ref 25.2–33.5)
MCHC RBC AUTO-ENTMCNC: 30.9 G/DL (ref 25.2–33.5)
MCV RBC AUTO: 80.8 FL (ref 82.6–102.9)
MONOCYTES # BLD: 9 % (ref 3–12)
NRBC AUTOMATED: 0 PER 100 WBC
PDW BLD-RTO: 14.9 % (ref 11.8–14.4)
PLATELET # BLD: 354 K/UL (ref 138–453)
PLATELET ESTIMATE: ABNORMAL
PMV BLD AUTO: 10.1 FL (ref 8.1–13.5)
POTASSIUM SERPL-SCNC: 3.5 MMOL/L (ref 3.7–5.3)
RBC # BLD: 4.01 M/UL (ref 3.95–5.11)
RBC # BLD: ABNORMAL 10*6/UL
SEG NEUTROPHILS: 60 % (ref 36–65)
SEGMENTED NEUTROPHILS ABSOLUTE COUNT: 5.47 K/UL (ref 1.5–8.1)
SODIUM BLD-SCNC: 138 MMOL/L (ref 135–144)
WBC # BLD: 9.1 K/UL (ref 3.5–11.3)
WBC # BLD: ABNORMAL 10*3/UL

## 2021-07-04 PROCEDURE — 36415 COLL VENOUS BLD VENIPUNCTURE: CPT

## 2021-07-04 PROCEDURE — 93971 EXTREMITY STUDY: CPT

## 2021-07-04 PROCEDURE — 80048 BASIC METABOLIC PNL TOTAL CA: CPT

## 2021-07-04 PROCEDURE — 85025 COMPLETE CBC W/AUTO DIFF WBC: CPT

## 2021-07-04 PROCEDURE — 99284 EMERGENCY DEPT VISIT MOD MDM: CPT

## 2021-07-04 ASSESSMENT — ENCOUNTER SYMPTOMS
BACK PAIN: 0
DIARRHEA: 0
COUGH: 0
ABDOMINAL PAIN: 0
NAUSEA: 0
VOMITING: 0
EYE PAIN: 0
CONSTIPATION: 0
SHORTNESS OF BREATH: 0

## 2021-07-04 ASSESSMENT — PAIN DESCRIPTION - LOCATION: LOCATION: LEG

## 2021-07-04 ASSESSMENT — PAIN DESCRIPTION - ORIENTATION: ORIENTATION: RIGHT

## 2021-07-04 ASSESSMENT — PAIN DESCRIPTION - DESCRIPTORS: DESCRIPTORS: SHARP

## 2021-07-04 ASSESSMENT — PAIN SCALES - GENERAL: PAINLEVEL_OUTOF10: 8

## 2021-07-04 ASSESSMENT — PAIN DESCRIPTION - PAIN TYPE: TYPE: ACUTE PAIN

## 2021-07-04 NOTE — ED PROVIDER NOTES
[facility]  eMERGENCY dEPARTMENT eNCOUnter      Pt Name: Jin Amaro  MRN: [id number]  [place] 1960  Date of evaluation: 7/4/2021      CHIEF COMPLAINT       Chief Complaint   Patient presents with    Leg Pain     right behind knee down through calf started 11am         HISTORY OF PRESENT ILLNESS    Jin Amaro is a 56 y.o. female who presents chief complaint of right leg pain patient says her calf is aching into her knee tingling her for a day or so she has a history of muscle spasm she has lupus and fibromyalgia she took her usual medications for muscle spasms and it has not helped she thinks it may be a bit of swelling to that calf as well she does not have a history of DVT there is been no fevers no chills no cough is been no acute injury she said the pain is easing up somewhat      REVIEW OF SYSTEMS         Review of Systems   Constitutional: Negative for chills and fever. HENT: Negative for congestion and ear pain. Eyes: Negative for pain and visual disturbance. Respiratory: Negative for cough and shortness of breath. Cardiovascular: Negative for chest pain, palpitations and leg swelling. Gastrointestinal: Negative for abdominal pain, constipation, diarrhea, nausea and vomiting. Endocrine: Negative for polydipsia and polyuria. Genitourinary: Negative for difficulty urinating, dysuria and frequency. Musculoskeletal: Negative for back pain, joint swelling, myalgias, neck pain and neck stiffness. Right calf pain as described   Skin: Negative for rash. Neurological: Negative for dizziness, weakness and headaches. Hematological: Negative for adenopathy. Does not bruise/bleed easily. Psychiatric/Behavioral: Negative for confusion, self-injury and suicidal ideas.          PAST MEDICAL HISTORY    has a past medical history of Anxiety, Asthma, Cerebral artery occlusion with cerebral infarction [facility]), Cerebrovascular disease, Depression, Diverticulitis, Fibromyalgia, History of small bowel obstruction, Hypertension, Lupus ([facility] and Seizures ([facility] SURGICAL HISTORY      has a past surgical history that includes Appendectomy;  section; Hysterectomy (); Dilation and curettage of uterus; Tubal ligation; Colonoscopy (); Colonoscopy (2015); Tooth Extraction (3/1/16); other surgical history (Left, 2016); pr egd transoral biopsy single/multiple (N/A, 2017); and Colonoscopy (N/A, 2020). CURRENT MEDICATIONS       Previous Medications    CHOLECALCIFEROL (VITAMIN D3) 43622 UNITS CAPS    Take 1 capsule by mouth daily    CYCLOBENZAPRINE (FLEXERIL) 10 MG TABLET    Take 1 tablet by mouth 3 times daily as needed for Muscle spasms    DICLOFENAC SODIUM 1 % GEL    Apply 2 g topically 4 times daily    DICYCLOMINE (BENTYL) 10 MG CAPSULE    Take 1 capsule by mouth 4 times daily as needed (Cramps/Abdominal Pain)    DOCUSATE SODIUM (COLACE) 100 MG CAPSULE    Take 1 capsule by mouth 2 times daily    HANDICAP PLACARD MISC    by Does not apply route Issue parking placard for persons with disabilities. [facility] sec 4503.44. Expires in 5 years. Applicant meets the qualifying disability criteria. HYOSCYAMINE (LEVBID) 375 MCG EXTENDED RELEASE TABLET    Take 1 tablet by mouth every 12 hours as needed for Cramping    LISINOPRIL (PRINIVIL;ZESTRIL) 10 MG TABLET    Take 1 tablet by mouth daily    MAGNESIUM PO    Take by mouth    MULTIPLE VITAMINS-MINERALS (MULTI-VITAMIN/MINERALS PO)    Take 1 tablet by mouth 2 times daily    OMEPRAZOLE (PRILOSEC) 20 MG DELAYED RELEASE CAPSULE    Take 1 capsule by mouth daily       ALLERGIES     is allergic to latex, dilaudid [hydromorphone hcl], hydromorphone, medrol [methylprednisolone], and prednisone. FAMILY HISTORY     She indicated that her mother is . She indicated that her father is . She indicated that her maternal aunt is . She indicated that her maternal uncle is .  She indicated that the status of her neg hx is unknown.     family history includes Dementia in her father; Diabetes in her maternal aunt, maternal uncle, and mother; Other in her father and mother. SOCIAL HISTORY      reports that she quit smoking about 28 years ago. She has never used smokeless tobacco. She reports current alcohol use. She reports that she does not use drugs. PHYSICAL EXAM     INITIAL VITALS:  height is 5' (1.524 m) and weight is 170 lb (77.1 kg). Her tympanic temperature is 98.9 °F (37.2 °C). Her blood pressure is 172/92 (abnormal) and her pulse is 94. Her respiration is 16 and oxygen saturation is 98%. Physical Exam  Constitutional:       Appearance: She is well-developed. HENT:      Head: Normocephalic and atraumatic. Eyes:      Conjunctiva/sclera: Conjunctivae normal.      Pupils: Pupils are equal, round, and reactive to light. Cardiovascular:      Rate and Rhythm: Normal rate and regular rhythm. Pulmonary:      Effort: Pulmonary effort is normal.      Breath sounds: Normal breath sounds. Abdominal:      General: Bowel sounds are normal. There is no distension. Palpations: Abdomen is soft. Tenderness: There is no abdominal tenderness. Musculoskeletal:         General: Tenderness present. Normal range of motion. Cervical back: Normal range of motion and neck supple. Comments: Patient is tender behind her right knee and her right calf there is no obvious swelling or deformity she has strong dorsalis pedis and posterior tibial pulses   Skin:     General: Skin is warm and dry. Neurological:      General: No focal deficit present. Mental Status: She is alert and oriented to person, place, and time.    Psychiatric:         Behavior: Behavior normal.           DIFFERENTIAL DIAGNOSIS/ MDM:     Right calf pain rule out DVT    DIAGNOSTIC RESULTS     EKG: All EKG's are interpreted by the Emergency Department Physician who either signs or Co-signs this chart in the absence of a cardiologist.        RADIOLOGY:   I directly visualized the following  images and reviewed the radiologist interpretations:       EXAMINATION:   DUPLEX VENOUS ULTRASOUND OF THE RIGHT LOWER EXTREMITY, 7/4/2021 3:13 pm       TECHNIQUE:   Duplex ultrasound using B-mode/gray scaled imaging and Doppler spectral   analysis and color flow was obtained of the right lower extremity.       COMPARISON:   None.       HISTORY:   ORDERING SYSTEM PROVIDED HISTORY: Swelling, Rule Out DVT   TECHNOLOGIST PROVIDED HISTORY:   Swelling, Rule Out DVT       FINDINGS:   The visualized veins of the right lower extremity are patent and free of   echogenic thrombus. The veins demonstrate good compressibility with normal   color flow study and spectral analysis.         Impression   No evidence of DVT in the right lower extremity.               ED BEDSIDE ULTRASOUND:       LABS:  Labs Reviewed   BASIC METABOLIC PANEL - Abnormal; Notable for the following components:       Result Value    Potassium 3.5 (*)     All other components within normal limits   CBC WITH AUTO DIFFERENTIAL - Abnormal; Notable for the following components:    Hemoglobin 10.0 (*)     Hematocrit 32.4 (*)     MCV 80.8 (*)     MCH 24.9 (*)     RDW 14.9 (*)     All other components within normal limits           EMERGENCY DEPARTMENT COURSE:   Vitals:    Vitals:    07/04/21 1654 07/04/21 1852   BP: (!) 169/96 (!) 172/92   Pulse: 104 94   Resp: 16 16   Temp: 98.9 °F (37.2 °C)    TempSrc: Tympanic    SpO2: 97% 98%   Weight: 170 lb (77.1 kg)    Height: 5' (1.524 m)      -------------------------  BP: (!) 172/92, Temp: 98.9 °F (37.2 °C), Pulse: 94, Resp: 16        Re-evaluation Notes    Patient is resting comfortably    CRITICAL CARE:   None        CONSULTS:      PROCEDURES:  None    FINAL IMPRESSION      1.  Right leg pain          DISPOSITION/PLAN   DISPOSITION discharged    Condition on Disposition  Stable    PATIENT REFERRED TO:  DO [address]  [place] [address]  [phone]    In 3 days        DISCHARGE MEDICATIONS:  New Prescriptions    No medications on file       (Please note that portions of this note were completed with a voice recognition program.  Efforts were made to edit the dictations but occasionally words are mis-transcribed.)    Ronaldo Delong MD,, MD, F.A.A.E.M.   Attending Emergency Physician                          Ronaldo Delong MD  07/04/21 2020

## 2021-07-08 ENCOUNTER — OFFICE VISIT (OUTPATIENT)
Dept: FAMILY MEDICINE CLINIC | Age: 61
End: 2021-07-08
Payer: MEDICARE

## 2021-07-08 ENCOUNTER — HOSPITAL ENCOUNTER (OUTPATIENT)
Dept: LAB | Age: 61
Discharge: HOME | End: 2021-07-08
Payer: MEDICARE

## 2021-07-08 VITALS
HEART RATE: 76 BPM | BODY MASS INDEX: 34.75 KG/M2 | SYSTOLIC BLOOD PRESSURE: 130 MMHG | WEIGHT: 177 LBS | DIASTOLIC BLOOD PRESSURE: 80 MMHG | HEIGHT: 60 IN

## 2021-07-08 DIAGNOSIS — E55.9 VITAMIN D DEFICIENCY: ICD-10-CM

## 2021-07-08 DIAGNOSIS — M79.605 LEG PAIN, BILATERAL: Primary | ICD-10-CM

## 2021-07-08 DIAGNOSIS — D50.9 IRON DEFICIENCY ANEMIA, UNSPECIFIED IRON DEFICIENCY ANEMIA TYPE: ICD-10-CM

## 2021-07-08 DIAGNOSIS — M79.604 LEG PAIN, BILATERAL: Primary | ICD-10-CM

## 2021-07-08 DIAGNOSIS — M72.2 PLANTAR FASCIITIS, BILATERAL: ICD-10-CM

## 2021-07-08 LAB
FERRITIN: 13 UG/L (ref 13–150)
IRON SATURATION: 6 % (ref 20–55)
IRON: 26 UG/DL (ref 37–145)
TOTAL IRON BINDING CAPACITY: 420 UG/DL (ref 250–450)
UNSATURATED IRON BINDING CAPACITY: 394 UG/DL (ref 112–347)
VITAMIN D 25-HYDROXY: 24.7 NG/ML (ref 30–100)

## 2021-07-08 PROCEDURE — 83550 IRON BINDING TEST: CPT

## 2021-07-08 PROCEDURE — 83540 ASSAY OF IRON: CPT

## 2021-07-08 PROCEDURE — 99213 OFFICE O/P EST LOW 20 MIN: CPT

## 2021-07-08 PROCEDURE — 1036F TOBACCO NON-USER: CPT | Performed by: FAMILY MEDICINE

## 2021-07-08 PROCEDURE — G8427 DOCREV CUR MEDS BY ELIG CLIN: HCPCS | Performed by: FAMILY MEDICINE

## 2021-07-08 PROCEDURE — 82306 VITAMIN D 25 HYDROXY: CPT

## 2021-07-08 PROCEDURE — 82728 ASSAY OF FERRITIN: CPT

## 2021-07-08 PROCEDURE — 3017F COLORECTAL CA SCREEN DOC REV: CPT | Performed by: FAMILY MEDICINE

## 2021-07-08 PROCEDURE — G8417 CALC BMI ABV UP PARAM F/U: HCPCS | Performed by: FAMILY MEDICINE

## 2021-07-08 PROCEDURE — 99214 OFFICE O/P EST MOD 30 MIN: CPT | Performed by: FAMILY MEDICINE

## 2021-07-08 PROCEDURE — 36415 COLL VENOUS BLD VENIPUNCTURE: CPT

## 2021-07-08 SDOH — ECONOMIC STABILITY: TRANSPORTATION INSECURITY
IN THE PAST 12 MONTHS, HAS THE LACK OF TRANSPORTATION KEPT YOU FROM MEDICAL APPOINTMENTS OR FROM GETTING MEDICATIONS?: NO

## 2021-07-08 SDOH — ECONOMIC STABILITY: FOOD INSECURITY: WITHIN THE PAST 12 MONTHS, YOU WORRIED THAT YOUR FOOD WOULD RUN OUT BEFORE YOU GOT MONEY TO BUY MORE.: NEVER TRUE

## 2021-07-08 SDOH — ECONOMIC STABILITY: TRANSPORTATION INSECURITY
IN THE PAST 12 MONTHS, HAS LACK OF TRANSPORTATION KEPT YOU FROM MEETINGS, WORK, OR FROM GETTING THINGS NEEDED FOR DAILY LIVING?: NO

## 2021-07-08 SDOH — ECONOMIC STABILITY: FOOD INSECURITY: WITHIN THE PAST 12 MONTHS, THE FOOD YOU BOUGHT JUST DIDN'T LAST AND YOU DIDN'T HAVE MONEY TO GET MORE.: NEVER TRUE

## 2021-07-08 ASSESSMENT — SOCIAL DETERMINANTS OF HEALTH (SDOH): HOW HARD IS IT FOR YOU TO PAY FOR THE VERY BASICS LIKE FOOD, HOUSING, MEDICAL CARE, AND HEATING?: SOMEWHAT HARD

## 2021-07-08 NOTE — PROGRESS NOTES
leg.  Came to ER for this and had negative doppler for DVT. Feet hurt often, sometimes when she first gets up in the morning. Starts getting better through the day - has to constantly wear shoes. Has been happening for a couple years. Pt has chronic anemia - last Hgb 10.0 on  while in the ER. Last iron studies were low in 3/2016. Not currently taking any iron supplements. Past Medical History:   Diagnosis Date    Anxiety     Asthma     Cerebral artery occlusion with cerebral infarction [facility]) 2009    Cerebrovascular disease     stroke     Depression     Diverticulitis     Fibromyalgia     History of small bowel obstruction     Hypertension     Lupus ([facility]     Seizures ([facility]  and       Past Surgical History:   Procedure Laterality Date    APPENDECTOMY       SECTION      x 4    COLONOSCOPY      nl    COLONOSCOPY  2015    1 bx    COLONOSCOPY N/A 2020    COLONOSCOPY performed by Jonathon Gross MD at [facility]  2009    TAHBSO due to fibroids MMR    OTHER SURGICAL HISTORY Left 2016    C6 TFE    [place] EGD TRANSORAL BIOPSY SINGLE/MULTIPLE N/A 2017    EGD BIOPSY performed by Jonathon Gross MD at [address] [facility]  3/1/16    16 removed on bottom 1 on top     TUBAL LIGATION       Family History   Problem Relation Age of Onset    Diabetes Mother         Passed due to DM at 62    Other Mother         enlarged heart    Dementia Father     Other Father         Brain tumor     Diabetes Maternal Aunt     Diabetes Maternal Uncle     Glaucoma Neg Hx      Social History     Tobacco Use    Smoking status: Former Smoker     Packs/day: 0.25     Years: 10.00     Pack years: 2.50     Quit date: 1993     Years since quittin.2    Smokeless tobacco: Never Used    Tobacco comment: alecia rrt 3/12/17   Substance Use Topics    Alcohol use:  Yes     Alcohol/week: 0.0 standard drinks     Comment: has about 1 or 2 a year. very rarely      Current Outpatient Medications   Medication Sig Dispense Refill    lisinopril (PRINIVIL;ZESTRIL) 10 MG tablet Take 1 tablet by mouth daily 30 tablet 0    dicyclomine (BENTYL) 10 MG capsule Take 1 capsule by mouth 4 times daily as needed (Cramps/Abdominal Pain) 120 capsule 2    hyoscyamine (LEVBID) 375 MCG extended release tablet Take 1 tablet by mouth every 12 hours as needed for Cramping 60 tablet 3    MAGNESIUM PO Take by mouth      Handicap Placard [facility] by Does not apply route Issue parking placard for persons with disabilities. [facility] sec 4503.44. Expires in 5 years. Applicant meets the qualifying disability criteria. 1 each 0    diclofenac sodium 1 % GEL Apply 2 g topically 4 times daily 3 Tube 3    cyclobenzaprine (FLEXERIL) 10 MG tablet Take 1 tablet by mouth 3 times daily as needed for Muscle spasms 90 tablet 11    Multiple Vitamins-Minerals (MULTI-VITAMIN/MINERALS PO) Take 1 tablet by mouth 2 times daily      Cholecalciferol (VITAMIN D3) 92928 units CAPS Take 1 capsule by mouth daily 30 capsule 5    docusate sodium (COLACE) 100 MG capsule Take 1 capsule by mouth 2 times daily 60 capsule 5    ferrous sulfate (FE TABS 325) 325 (65 Fe) MG EC tablet Take 1 tablet by mouth daily (with breakfast) 30 tablet 0    vitamin D (ERGOCALCIFEROL) 1.25 MG (47878 UT) CAPS capsule Take 1 capsule by mouth once a week 12 capsule 0     No current facility-administered medications for this visit.      Allergies   Allergen Reactions    Latex Swelling     The powder in the gloves    Dilaudid [Hydromorphone Hcl] Nausea And Vomiting    Hydromorphone     Medrol [Methylprednisolone] Other (See Comments)     Back pain    Prednisone Nausea And Vomiting       Health Maintenance   Topic Date Due    COVID-19 Vaccine (1) Never done    Cervical cancer screen  12/01/2016    Annual Wellness Visit (AWV)  Never done    Shingles Vaccine (1 of 2) 09/16/2021 (Originally 8/9/2008)    Flu vaccine (1) 09/01/2021    Potassium monitoring  07/04/2022    Creatinine monitoring  07/04/2022    Breast cancer screen  10/10/2022    Lipid screen  09/18/2025    DTaP/Tdap/Td vaccine (2 - Td or Tdap) 04/28/2026    Colon cancer screen colonoscopy  09/29/2030    Hepatitis C screen  Completed    HIV screen  Completed    Hepatitis A vaccine  Aged Out    Hepatitis B vaccine  Aged Out    Hib vaccine  Aged Out    Meningococcal (ACWY) vaccine  Aged Out    Pneumococcal 0-64 years Vaccine  Aged Out       Subjective:      Review of Systems    Objective:     Vitals:    07/08/21 0855   BP: 130/80   Site: Right Upper Arm   Position: Sitting   Cuff Size: Large Adult   Pulse: 76   Weight: 177 lb (80.3 kg)   Height: 5' (1.524 m)     Physical Exam  Vitals and nursing note reviewed. Constitutional:       General: She is not in acute distress. Appearance: She is well-developed. HENT:      Head: Normocephalic and atraumatic. Right Ear: Tympanic membrane, ear canal and external ear normal.      Left Ear: Tympanic membrane, ear canal and external ear normal.      Nose: Nose normal.      Mouth/Throat:      Mouth: Mucous membranes are moist.      Pharynx: Oropharynx is clear. No posterior oropharyngeal erythema. Eyes:      Conjunctiva/sclera: Conjunctivae normal.   Cardiovascular:      Rate and Rhythm: Normal rate and regular rhythm. Heart sounds: Normal heart sounds. Pulmonary:      Effort: Pulmonary effort is normal. No respiratory distress. Breath sounds: Normal breath sounds. Abdominal:      General: Bowel sounds are normal. There is no distension. Palpations: Abdomen is soft. Tenderness: There is no abdominal tenderness. Musculoskeletal:      Cervical back: Neck supple. Right foot: Tenderness present. Left foot: Tenderness present. Skin:     General: Skin is warm and dry.    Neurological:      Mental Status: She is alert and oriented to person, place, and time. Assessment:       Diagnosis Orders   1. Leg pain, bilateral     2. Plantar fasciitis, bilateral     3. Iron deficiency anemia, unspecified iron deficiency anemia type  Iron And TIBC    Ferritin   4. Vitamin D deficiency  Vitamin D 25 Hydroxy   5. BMI 34.0-34.9,adult           Plan:      Declined Covid vaccine today. Return in about 6 weeks (around 8/19/2021) for well woman exam with Pap. Orders Placed This Encounter   Procedures    Iron And TIBC     Standing Status:   Future     Number of Occurrences:   1     Standing Expiration Date:   7/8/2022     Order Specific Question:   Is Patient Fasting? Answer:   n/a     Order Specific Question:   No of Hours? Answer:   n/a    Ferritin     Standing Status:   Future     Number of Occurrences:   1     Standing Expiration Date:   7/8/2022    Vitamin D 25 Hydroxy     Standing Status:   Future     Number of Occurrences:   1     Standing Expiration Date:   7/8/2022     No orders of the defined types were placed in this encounter. Patient given educational materials - see patient instructions. Discussed use, benefit, and side effects of prescribed medications. All patient questions answered. Pt voiced understanding. Reviewed health maintenance.             Electronically signed by Gopal Mabry DO, DO on 7/18/2021 at 11:55 PM

## 2021-07-08 NOTE — PATIENT INSTRUCTIONS
Patient Education        Plantar Fasciitis: Care Instructions  Overview     Plantar fasciitis is pain and inflammation of the plantar fascia, the tissue at the bottom of your foot that connects the heel bone to the toes. The plantar fascia also supports the arch. If you strain the plantar fascia, it can develop small tears and cause heel pain when you stand or walk. Plantar fasciitis can be caused by running or other sports. It also may occur in people who are overweight or who have high arches or flat feet. You may get plantar fasciitis if you walk or stand for long periods, or have a tight Achilles tendon or calf muscles. You can improve your foot pain with rest and other care at home. It might take a few weeks to a few months for your foot to heal completely. Follow-up care is a key part of your treatment and safety. Be sure to make and go to all appointments, and call your doctor if you are having problems. It's also a good idea to know your test results and keep a list of the medicines you take. How can you care for yourself at home? · Rest your feet often. Reduce your activity to a level that lets you avoid pain. If possible, do not run or walk on hard surfaces. · Take pain medicines exactly as directed. ? If the doctor gave you a prescription medicine for pain, take it as prescribed. ? If you are not taking a prescription pain medicine, take an over-the-counter anti-inflammatory medicine for pain and swelling, such as ibuprofen (Advil, Motrin) or naproxen (Aleve). Read and follow all instructions on the label. · Use ice massage to help with pain and swelling. You can use an ice cube or an ice cup several times a day. To make an ice cup, fill a paper cup with water and freeze it. Cut off the top of the cup until a half-inch of ice shows. Hold onto the remaining paper to use the cup. Rub the ice in small circles over the area for 5 to 7 minutes.   · Contrast baths, which alternate hot and cold water, can also help reduce swelling. But because heat alone may make pain and swelling worse, end a contrast bath with a soak in cold water. · Wear a night splint if your doctor suggests it. A night splint holds your foot with the toes pointed up and the foot and ankle at a 90-degree angle. This position gives the bottom of your foot a constant, gentle stretch. · Do simple exercises such as calf stretches and towel stretches 2 to 3 times each day, especially when you first get up in the morning. These can help the plantar fascia become more flexible. They also make the muscles that support your arch stronger. Hold these stretches for 15 to 30 seconds per stretch. Repeat 2 to 4 times. ? Stand about 1 foot from a wall. Place the palms of both hands against the wall at chest level. Lean forward against the wall, keeping one leg with the knee straight and heel on the ground while bending the knee of the other leg.  ? Sit down on the floor or a mat with your feet stretched in front of you. Roll up a towel lengthwise, and loop it over the ball of your foot. Holding the towel at both ends, gently pull the towel toward you to stretch your foot. · Wear shoes with good arch support. Athletic shoes or shoes with a well-cushioned sole are good choices. · Replace athletic shoes regularly. · Try heel cups or shoe inserts (orthotics) to help cushion your heel. You can buy these at many shoe stores. · Put on your shoes as soon as you get out of bed. Going barefoot or wearing slippers may make your pain worse. · Reach and stay at a good weight for your height. This puts less strain on your feet. When should you call for help? Call your doctor now or seek immediate medical care if:    · You have heel pain with fever, redness, or warmth in your heel.     · You cannot put weight on the sore foot.    Watch closely for changes in your health, and be sure to contact your doctor if:    · You have numbness or tingling in your heel.     · Your heel pain lasts more than 2 weeks. Where can you learn more? Go to https://chpepiceweb.[employer]. org and sign in to your "[employer] account. Enter F427 in the [facility] box to learn more about \"Plantar Fasciitis: Care Instructions. \"     If you do not have an account, please click on the \"Sign Up Now\" link. Current as of: November 16, 2020               Content Version: 12.9  © 2006-2021 Healthwise, Do It In Person. Care instructions adapted under license by [facility] ([facility]). If you have questions about a medical condition or this instruction, always ask your healthcare professional. [place] [address] any warranty or liability for your use of this information. Patient Education        Plantar Fasciitis: Exercises  Introduction  Here are some examples of exercises for you to try. The exercises may be suggested for a condition or for rehabilitation. Start each exercise slowly. Ease off the exercises if you start to have pain. You will be told when to start these exercises and which ones will work best for you. How to do the exercises  Towel stretch   1. Sit with your legs extended and knees straight. 2. Place a towel around your foot just under the toes. 3. Hold each end of the towel in each hand, with your hands above your knees. 4. Pull back with the towel so that your foot stretches toward you. 5. Hold the position for at least 15 to 30 seconds. 6. Repeat 2 to 4 times a session, up to 5 sessions a day. Calf stretch   This exercise stretches the muscles at the back of the lower leg (the calf) and the Achilles tendon. Do this exercise 3 or 4 times a day, 5 days a week. 1. Stand facing a wall with your hands on the wall at about eye level. Put the leg you want to stretch about a step behind your other leg. 2. Keeping your back heel on the floor, bend your front knee until you feel a stretch in the back leg. 3. Hold the stretch for 15 to 30 seconds.  Repeat 2 to 4 times. Plantar fascia and calf stretch   Stretching the plantar fascia and calf muscles can increase flexibility and decrease heel pain. You can do this exercise several times each day and before and after activity. 1. Stand on a step as shown above. Be sure to hold on to the banister. 2. Slowly let your heels down over the edge of the step as you relax your calf muscles. You should feel a gentle stretch across the bottom of your foot and up the back of your leg to your knee. 3. Hold the stretch about 15 to 30 seconds, and then tighten your calf muscle a little to bring your heel back up to the level of the step. Repeat 2 to 4 times. Towel curls   Make this exercise more challenging by placing a weighted object, such as a soup can, on the other end of the towel. 1. While sitting, place your foot on a towel on the floor and scrunch the towel toward you with your toes. 2. Then, also using your toes, push the towel away from you. Tavernier pickups   1. Put marbles on the floor next to a cup.  2. Using your toes, try to lift the marbles up from the floor and put them in the cup. Follow-up care is a key part of your treatment and safety. Be sure to make and go to all appointments, and call your doctor if you are having problems. It's also a good idea to know your test results and keep a list of the medicines you take. Where can you learn more? Go to https://Atraverda.[employer]. org and sign in to your [employer] account. Enter O625 in the [facility] box to learn more about \"Plantar Fasciitis: Exercises. \"     If you do not have an account, please click on the \"Sign Up Now\" link. Current as of: November 16, 2020               Content Version: 12.9  © 1030-5143 Healthwise, Incorporated. Care instructions adapted under license by [facility] ([facility]).  If you have questions about a medical condition or this instruction, always ask your healthcare professional. Dax Haley disclaims any warranty or liability for your use of this information.

## 2021-07-12 DIAGNOSIS — D50.9 IRON DEFICIENCY ANEMIA, UNSPECIFIED IRON DEFICIENCY ANEMIA TYPE: ICD-10-CM

## 2021-07-12 DIAGNOSIS — E55.9 VITAMIN D DEFICIENCY: Primary | ICD-10-CM

## 2021-07-12 RX ORDER — MELATONIN
1000 3 TIMES DAILY
Qty: 90 TABLET | Refills: 0 | Status: CANCELLED | OUTPATIENT
Start: 2021-07-12 | End: 2021-08-11

## 2021-07-12 RX ORDER — LANOLIN ALCOHOL/MO/W.PET/CERES
325 CREAM (GRAM) TOPICAL
Qty: 30 TABLET | Refills: 0 | Status: SHIPPED | OUTPATIENT
Start: 2021-07-12

## 2021-07-12 RX ORDER — ERGOCALCIFEROL 1.25 MG/1
50000 CAPSULE ORAL WEEKLY
Qty: 12 CAPSULE | Refills: 0 | Status: SHIPPED | OUTPATIENT
Start: 2021-07-12

## 2021-07-23 DIAGNOSIS — I10 ESSENTIAL HYPERTENSION, BENIGN: ICD-10-CM

## 2021-07-23 RX ORDER — LISINOPRIL 10 MG/1
10 TABLET ORAL DAILY
Qty: 90 TABLET | Refills: 1 | Status: ON HOLD | OUTPATIENT
Start: 2021-07-23 | End: 2021-07-30 | Stop reason: HOSPADM

## 2021-07-23 NOTE — TELEPHONE ENCOUNTER
Kassie Bradford called requesting a refill of the below medication which has been pended for you:     Requested Prescriptions     Pending Prescriptions Disp Refills    lisinopril (PRINIVIL;ZESTRIL) 10 MG tablet [Pharmacy Med Name: LISINOPRIL 10 MG TABLET] 30 tablet 0     Sig: take 1 tablet by mouth once daily       Last Appointment Date: 7/8/2021  Next Appointment Date: 9/22/2021    Allergies   Allergen Reactions    Latex Swelling     The powder in the gloves    Dilaudid [Hydromorphone Hcl] Nausea And Vomiting    Hydromorphone     Medrol [Methylprednisolone] Other (See Comments)     Back pain    Prednisone Nausea And Vomiting

## 2021-07-27 ENCOUNTER — NURSE TRIAGE (OUTPATIENT)
Dept: OTHER | Facility: CLINIC | Age: 61
End: 2021-07-27

## 2021-07-27 ENCOUNTER — HOSPITAL ENCOUNTER (INPATIENT)
Age: 61
LOS: 3 days | Discharge: HOME | DRG: 392 | End: 2021-07-30
Attending: EMERGENCY MEDICINE | Admitting: INTERNAL MEDICINE
Payer: MEDICARE

## 2021-07-27 ENCOUNTER — APPOINTMENT (OUTPATIENT)
Dept: CT IMAGING | Age: 61
DRG: 392 | End: 2021-07-27
Payer: MEDICARE

## 2021-07-27 DIAGNOSIS — K52.9 COLITIS: Primary | ICD-10-CM

## 2021-07-27 DIAGNOSIS — K62.5 RECTAL BLEED: ICD-10-CM

## 2021-07-27 PROBLEM — K57.92 DIVERTICULITIS: Status: ACTIVE | Noted: 2021-07-27

## 2021-07-27 LAB
-: ABNORMAL
ABSOLUTE EOS #: 0.12 K/UL (ref 0–0.44)
ABSOLUTE IMMATURE GRANULOCYTE: 0.04 K/UL (ref 0–0.3)
ABSOLUTE LYMPH #: 1.8 K/UL (ref 1.1–3.7)
ABSOLUTE MONO #: 0.8 K/UL (ref 0.1–1.2)
ALBUMIN SERPL-MCNC: 4.5 G/DL (ref 3.5–5.2)
ALBUMIN/GLOBULIN RATIO: 1.1 (ref 1–2.5)
ALP BLD-CCNC: 85 U/L (ref 35–104)
ALT SERPL-CCNC: 12 U/L (ref 5–33)
AMORPHOUS: ABNORMAL
AMYLASE: 75 U/L (ref 28–100)
ANION GAP SERPL CALCULATED.3IONS-SCNC: 15 MMOL/L (ref 9–17)
AST SERPL-CCNC: 21 U/L
BACTERIA: ABNORMAL
BASOPHILS # BLD: 0 % (ref 0–2)
BASOPHILS ABSOLUTE: 0.03 K/UL (ref 0–0.2)
BILIRUB SERPL-MCNC: 0.25 MG/DL (ref 0.3–1.2)
BILIRUBIN URINE: NEGATIVE
BUN BLDV-MCNC: 10 MG/DL (ref 8–23)
BUN/CREAT BLD: 15 (ref 9–20)
CALCIUM SERPL-MCNC: 9.6 MG/DL (ref 8.6–10.4)
CASTS UA: ABNORMAL /LPF (ref 0–2)
CHLORIDE BLD-SCNC: 100 MMOL/L (ref 98–107)
CO2: 22 MMOL/L (ref 20–31)
COLOR: ABNORMAL
COMMENT UA: ABNORMAL
CREAT SERPL-MCNC: 0.65 MG/DL (ref 0.5–0.9)
CRYSTALS, UA: ABNORMAL /HPF
DIFFERENTIAL TYPE: ABNORMAL
EOSINOPHILS RELATIVE PERCENT: 1 % (ref 1–4)
EPITHELIAL CELLS UA: ABNORMAL /HPF (ref 0–5)
GFR AFRICAN AMERICAN: >60 ML/MIN
GFR NON-AFRICAN AMERICAN: >60 ML/MIN
GFR SERPL CREATININE-BSD FRML MDRD: ABNORMAL ML/MIN/{1.73_M2}
GFR SERPL CREATININE-BSD FRML MDRD: ABNORMAL ML/MIN/{1.73_M2}
GLUCOSE BLD-MCNC: 109 MG/DL (ref 70–99)
GLUCOSE URINE: NEGATIVE
HCT VFR BLD CALC: 38 % (ref 36.3–47.1)
HEMOGLOBIN: 11.5 G/DL (ref 11.9–15.1)
IMMATURE GRANULOCYTES: 0 %
KETONES, URINE: NEGATIVE
LACTIC ACID: 1.3 MMOL/L (ref 0.5–2.2)
LEUKOCYTE ESTERASE, URINE: NEGATIVE
LIPASE: 20 U/L (ref 13–60)
LYMPHOCYTES # BLD: 14 % (ref 24–43)
MCH RBC QN AUTO: 24.5 PG (ref 25.2–33.5)
MCHC RBC AUTO-ENTMCNC: 30.3 G/DL (ref 25.2–33.5)
MCV RBC AUTO: 80.9 FL (ref 82.6–102.9)
MONOCYTES # BLD: 6 % (ref 3–12)
MUCUS: ABNORMAL
NITRITE, URINE: NEGATIVE
NRBC AUTOMATED: 0 PER 100 WBC
OTHER OBSERVATIONS UA: ABNORMAL
PDW BLD-RTO: 15.2 % (ref 11.8–14.4)
PH UA: 6 (ref 5–6)
PLATELET # BLD: 437 K/UL (ref 138–453)
PLATELET ESTIMATE: ABNORMAL
PMV BLD AUTO: 10 FL (ref 8.1–13.5)
POTASSIUM SERPL-SCNC: 4.2 MMOL/L (ref 3.7–5.3)
PROTEIN UA: ABNORMAL
RBC # BLD: 4.7 M/UL (ref 3.95–5.11)
RBC # BLD: ABNORMAL 10*6/UL
RBC UA: ABNORMAL /HPF (ref 0–4)
RENAL EPITHELIAL, UA: ABNORMAL /HPF
SARS-COV-2, RAPID: NOT DETECTED
SEG NEUTROPHILS: 79 % (ref 36–65)
SEGMENTED NEUTROPHILS ABSOLUTE COUNT: 10.12 K/UL (ref 1.5–8.1)
SODIUM BLD-SCNC: 137 MMOL/L (ref 135–144)
SPECIFIC GRAVITY UA: 1.01 (ref 1.01–1.02)
SPECIMEN DESCRIPTION: NORMAL
THYROXINE, FREE: 1.11 NG/DL (ref 0.93–1.7)
TOTAL PROTEIN: 8.5 G/DL (ref 6.4–8.3)
TRICHOMONAS: ABNORMAL
TSH SERPL DL<=0.05 MIU/L-ACNC: 1.45 MIU/L (ref 0.3–5)
TURBIDITY: ABNORMAL
URINE HGB: ABNORMAL
UROBILINOGEN, URINE: NORMAL
WBC # BLD: 12.9 K/UL (ref 3.5–11.3)
WBC # BLD: ABNORMAL 10*3/UL
WBC UA: ABNORMAL /HPF (ref 0–4)
YEAST: ABNORMAL

## 2021-07-27 PROCEDURE — 87324 CLOSTRIDIUM AG IA: CPT

## 2021-07-27 PROCEDURE — 99222 1ST HOSP IP/OBS MODERATE 55: CPT | Performed by: INTERNAL MEDICINE

## 2021-07-27 PROCEDURE — 84439 ASSAY OF FREE THYROXINE: CPT

## 2021-07-27 PROCEDURE — 36415 COLL VENOUS BLD VENIPUNCTURE: CPT

## 2021-07-27 PROCEDURE — 6360000002 HC RX W HCPCS: Performed by: INTERNAL MEDICINE

## 2021-07-27 PROCEDURE — 81001 URINALYSIS AUTO W/SCOPE: CPT

## 2021-07-27 PROCEDURE — 96374 THER/PROPH/DIAG INJ IV PUSH: CPT

## 2021-07-27 PROCEDURE — 2060000000 HC ICU INTERMEDIATE R&B

## 2021-07-27 PROCEDURE — 74177 CT ABD & PELVIS W/CONTRAST: CPT

## 2021-07-27 PROCEDURE — 80053 COMPREHEN METABOLIC PANEL: CPT

## 2021-07-27 PROCEDURE — 6360000002 HC RX W HCPCS: Performed by: EMERGENCY MEDICINE

## 2021-07-27 PROCEDURE — 96375 TX/PRO/DX INJ NEW DRUG ADDON: CPT

## 2021-07-27 PROCEDURE — 6370000000 HC RX 637 (ALT 250 FOR IP): Performed by: INTERNAL MEDICINE

## 2021-07-27 PROCEDURE — 2500000003 HC RX 250 WO HCPCS: Performed by: EMERGENCY MEDICINE

## 2021-07-27 PROCEDURE — 94760 N-INVAS EAR/PLS OXIMETRY 1: CPT

## 2021-07-27 PROCEDURE — 83690 ASSAY OF LIPASE: CPT

## 2021-07-27 PROCEDURE — 87449 NOS EACH ORGANISM AG IA: CPT

## 2021-07-27 PROCEDURE — 87635 SARS-COV-2 COVID-19 AMP PRB: CPT

## 2021-07-27 PROCEDURE — 99284 EMERGENCY DEPT VISIT MOD MDM: CPT

## 2021-07-27 PROCEDURE — 82150 ASSAY OF AMYLASE: CPT

## 2021-07-27 PROCEDURE — 84443 ASSAY THYROID STIM HORMONE: CPT

## 2021-07-27 PROCEDURE — 85025 COMPLETE CBC W/AUTO DIFF WBC: CPT

## 2021-07-27 PROCEDURE — 2500000003 HC RX 250 WO HCPCS: Performed by: INTERNAL MEDICINE

## 2021-07-27 PROCEDURE — 2580000003 HC RX 258: Performed by: INTERNAL MEDICINE

## 2021-07-27 PROCEDURE — 6360000004 HC RX CONTRAST MEDICATION: Performed by: EMERGENCY MEDICINE

## 2021-07-27 PROCEDURE — 83605 ASSAY OF LACTIC ACID: CPT

## 2021-07-27 RX ORDER — METAXALONE 800 MG/1
800 TABLET ORAL 3 TIMES DAILY
Status: DISCONTINUED | OUTPATIENT
Start: 2021-07-27 | End: 2021-07-30 | Stop reason: HOSPADM

## 2021-07-27 RX ORDER — FENTANYL CITRATE 50 UG/ML
50 INJECTION, SOLUTION INTRAMUSCULAR; INTRAVENOUS ONCE
Status: COMPLETED | OUTPATIENT
Start: 2021-07-27 | End: 2021-07-27

## 2021-07-27 RX ORDER — CIPROFLOXACIN 2 MG/ML
400 INJECTION, SOLUTION INTRAVENOUS ONCE
Status: COMPLETED | OUTPATIENT
Start: 2021-07-27 | End: 2021-07-27

## 2021-07-27 RX ORDER — KETOROLAC TROMETHAMINE 30 MG/ML
30 INJECTION, SOLUTION INTRAMUSCULAR; INTRAVENOUS EVERY 6 HOURS PRN
Status: DISCONTINUED | OUTPATIENT
Start: 2021-07-27 | End: 2021-07-29

## 2021-07-27 RX ORDER — HYOSCYAMINE SULFATE EXTENDED-RELEASE 0.38 MG/1
375 TABLET ORAL EVERY 12 HOURS PRN
Status: DISCONTINUED | OUTPATIENT
Start: 2021-07-27 | End: 2021-07-27

## 2021-07-27 RX ORDER — DICYCLOMINE HYDROCHLORIDE 10 MG/1
10 CAPSULE ORAL 4 TIMES DAILY PRN
Status: DISCONTINUED | OUTPATIENT
Start: 2021-07-27 | End: 2021-07-30 | Stop reason: HOSPADM

## 2021-07-27 RX ORDER — ONDANSETRON 2 MG/ML
4 INJECTION INTRAMUSCULAR; INTRAVENOUS ONCE
Status: COMPLETED | OUTPATIENT
Start: 2021-07-27 | End: 2021-07-27

## 2021-07-27 RX ORDER — CIPROFLOXACIN 2 MG/ML
400 INJECTION, SOLUTION INTRAVENOUS EVERY 24 HOURS
Status: DISCONTINUED | OUTPATIENT
Start: 2021-07-28 | End: 2021-07-27

## 2021-07-27 RX ORDER — CIPROFLOXACIN 2 MG/ML
400 INJECTION, SOLUTION INTRAVENOUS EVERY 12 HOURS
Status: DISCONTINUED | OUTPATIENT
Start: 2021-07-28 | End: 2021-07-30 | Stop reason: HOSPADM

## 2021-07-27 RX ORDER — SODIUM CHLORIDE 9 MG/ML
25 INJECTION, SOLUTION INTRAVENOUS PRN
Status: DISCONTINUED | OUTPATIENT
Start: 2021-07-27 | End: 2021-07-30 | Stop reason: HOSPADM

## 2021-07-27 RX ORDER — LISINOPRIL 5 MG/1
15 TABLET ORAL DAILY
Status: DISCONTINUED | OUTPATIENT
Start: 2021-07-27 | End: 2021-07-27

## 2021-07-27 RX ORDER — LISINOPRIL 5 MG/1
10 TABLET ORAL DAILY
Status: DISCONTINUED | OUTPATIENT
Start: 2021-07-27 | End: 2021-07-27

## 2021-07-27 RX ORDER — SODIUM CHLORIDE 9 MG/ML
INJECTION, SOLUTION INTRAVENOUS CONTINUOUS
Status: DISCONTINUED | OUTPATIENT
Start: 2021-07-27 | End: 2021-07-30 | Stop reason: HOSPADM

## 2021-07-27 RX ORDER — ONDANSETRON 2 MG/ML
4 INJECTION INTRAMUSCULAR; INTRAVENOUS EVERY 6 HOURS PRN
Status: DISCONTINUED | OUTPATIENT
Start: 2021-07-27 | End: 2021-07-30 | Stop reason: HOSPADM

## 2021-07-27 RX ORDER — AMLODIPINE BESYLATE 5 MG/1
10 TABLET ORAL DAILY
Status: DISCONTINUED | OUTPATIENT
Start: 2021-07-27 | End: 2021-07-30 | Stop reason: HOSPADM

## 2021-07-27 RX ORDER — SODIUM CHLORIDE 0.9 % (FLUSH) 0.9 %
10 SYRINGE (ML) INJECTION PRN
Status: DISCONTINUED | OUTPATIENT
Start: 2021-07-27 | End: 2021-07-30 | Stop reason: HOSPADM

## 2021-07-27 RX ORDER — ACETAMINOPHEN 325 MG/1
650 TABLET ORAL EVERY 4 HOURS PRN
Status: DISCONTINUED | OUTPATIENT
Start: 2021-07-27 | End: 2021-07-30 | Stop reason: HOSPADM

## 2021-07-27 RX ORDER — SODIUM CHLORIDE 0.9 % (FLUSH) 0.9 %
10 SYRINGE (ML) INJECTION EVERY 12 HOURS SCHEDULED
Status: DISCONTINUED | OUTPATIENT
Start: 2021-07-27 | End: 2021-07-30 | Stop reason: HOSPADM

## 2021-07-27 RX ADMIN — ONDANSETRON 4 MG: 2 INJECTION INTRAMUSCULAR; INTRAVENOUS at 12:55

## 2021-07-27 RX ADMIN — CIPROFLOXACIN 400 MG: 2 INJECTION, SOLUTION INTRAVENOUS at 14:24

## 2021-07-27 RX ADMIN — SODIUM CHLORIDE: 9 INJECTION, SOLUTION INTRAVENOUS at 16:22

## 2021-07-27 RX ADMIN — METRONIDAZOLE 500 MG: 500 INJECTION, SOLUTION INTRAVENOUS at 22:00

## 2021-07-27 RX ADMIN — METRONIDAZOLE 500 MG: 500 INJECTION, SOLUTION INTRAVENOUS at 14:46

## 2021-07-27 RX ADMIN — METAXALONE 800 MG: 800 TABLET ORAL at 16:47

## 2021-07-27 RX ADMIN — FENTANYL CITRATE 50 MCG: 50 INJECTION, SOLUTION INTRAMUSCULAR; INTRAVENOUS at 12:58

## 2021-07-27 RX ADMIN — KETOROLAC TROMETHAMINE 30 MG: 30 INJECTION, SOLUTION INTRAMUSCULAR; INTRAVENOUS at 23:33

## 2021-07-27 RX ADMIN — AMLODIPINE BESYLATE 10 MG: 5 TABLET ORAL at 16:47

## 2021-07-27 RX ADMIN — KETOROLAC TROMETHAMINE 30 MG: 30 INJECTION, SOLUTION INTRAMUSCULAR; INTRAVENOUS at 16:48

## 2021-07-27 RX ADMIN — METAXALONE 800 MG: 800 TABLET ORAL at 20:39

## 2021-07-27 RX ADMIN — DICYCLOMINE HYDROCHLORIDE 10 MG: 10 CAPSULE ORAL at 20:39

## 2021-07-27 RX ADMIN — IOPAMIDOL 100 ML: 755 INJECTION, SOLUTION INTRAVENOUS at 13:39

## 2021-07-27 RX ADMIN — SODIUM CHLORIDE: 9 INJECTION, SOLUTION INTRAVENOUS at 23:33

## 2021-07-27 ASSESSMENT — PAIN SCALES - GENERAL
PAINLEVEL_OUTOF10: 9
PAINLEVEL_OUTOF10: 6
PAINLEVEL_OUTOF10: 9
PAINLEVEL_OUTOF10: 9
PAINLEVEL_OUTOF10: 6
PAINLEVEL_OUTOF10: 6
PAINLEVEL_OUTOF10: 5
PAINLEVEL_OUTOF10: 6

## 2021-07-27 ASSESSMENT — PAIN DESCRIPTION - LOCATION
LOCATION: ABDOMEN

## 2021-07-27 ASSESSMENT — PAIN DESCRIPTION - ORIENTATION
ORIENTATION: RIGHT;LEFT;LOWER
ORIENTATION: LOWER
ORIENTATION: LOWER
ORIENTATION: RIGHT;LEFT;LOWER;UPPER

## 2021-07-27 ASSESSMENT — PAIN DESCRIPTION - PAIN TYPE
TYPE: ACUTE PAIN

## 2021-07-27 ASSESSMENT — ENCOUNTER SYMPTOMS
DIARRHEA: 1
BLOOD IN STOOL: 1
BACK PAIN: 0
CONSTIPATION: 0
VOMITING: 1
NAUSEA: 1
ANAL BLEEDING: 1
COUGH: 0
EYE PAIN: 0
SHORTNESS OF BREATH: 0
ABDOMINAL PAIN: 1

## 2021-07-27 ASSESSMENT — PAIN DESCRIPTION - PROGRESSION
CLINICAL_PROGRESSION: GRADUALLY WORSENING
CLINICAL_PROGRESSION: GRADUALLY WORSENING

## 2021-07-27 ASSESSMENT — PAIN DESCRIPTION - DESCRIPTORS
DESCRIPTORS: OTHER (COMMENT)
DESCRIPTORS: CRAMPING
DESCRIPTORS: CRAMPING
DESCRIPTORS: CRAMPING;STABBING

## 2021-07-27 ASSESSMENT — PAIN DESCRIPTION - FREQUENCY: FREQUENCY: INTERMITTENT

## 2021-07-27 NOTE — H&P
HOSPITALIST ADMISSION H&P      REASON FOR ADMISSION:    Diverticulitis---rectal bleeding  ESTIMATED LENGTH OF STAY:    > 2 midnights----2-4 days    ATTENDING/ADMITTING PHYSICIAN: Myla Balbuena MD  PCP: Nakia Knott DO    HISTORY OF PRESENT ILLNESS:      The patient is a 56 y.o. female patient of Nakia Knott DO who presents with onset of abdominal pain and cramping especially LLQ c. 2200h 2021---noticed some red blood in stool c. 0300h 2021---some diarrhea---stool cleared now---HGB = 11.5----imagine studies ---> sigmoid diverticulitis ---> admission---Cipro---Flagyl------diverticulitis history    Hypertension---has been on lisinopril---BP uncontrolled----given black female, changing to Norvasc and if needed, a thiazide. Asthma----quiescent    Cerebrovascular disease---stable    Chronic pain syndrome    SLE--lupus           See below for additional PMH. Patient isrk-qtcproummi-hdhuefyi-available records reviewed, including, but not limited to,  ER reports--labs---imaging--office records--personal notes.        Past Medical History:   Diagnosis Date    Anxiety     Asthma     Cerebral artery occlusion with cerebral infarction [facility])     Cerebrovascular disease     stroke     Depression     Diverticulitis     Fibromyalgia     History of small bowel obstruction     Hypertension     [facility]upus ([facility]     Seizures ([facility]  and            Past Surgical History:   Procedure Laterality Date    APPENDECTOMY       SECTION      x 4    COLONOSCOPY  2007    nl    COLONOSCOPY  2015    1 bx    COLONOSCOPY N/A 2020    COLONOSCOPY performed by [facility] 21      TAHBSO due to fibroids MMR    OTHER SURGICAL HISTORY Left 2016    C6 TFE    [place] EGD TRANSORAL BIOPSY SINGLE/MULTIPLE N/A 2017    EGD BIOPSY performed by Tish Dominguez MD at [address] [place] [address]  3/1/16    16 removed on bottom 1 on top     TUBAL LIGATION         Medications Prior to Admission:    Medications Prior to Admission: lisinopril (PRINIVIL;ZESTRIL) 10 MG tablet, Take 1 tablet by mouth daily  ferrous sulfate (FE TABS 325) 325 (65 Fe) MG EC tablet, Take 1 tablet by mouth daily (with breakfast)  dicyclomine (BENTYL) 10 MG capsule, Take 1 capsule by mouth 4 times daily as needed (Cramps/Abdominal Pain)  hyoscyamine (LEVBID) 375 MCG extended release tablet, Take 1 tablet by mouth every 12 hours as needed for Cramping  MAGNESIUM PO, Take by mouth daily   diclofenac sodium 1 % GEL, Apply 2 g topically 4 times daily  cyclobenzaprine (FLEXERIL) 10 MG tablet, Take 1 tablet by mouth 3 times daily as needed for Muscle spasms  Multiple Vitamins-Minerals (MULTI-VITAMIN/MINERALS PO), Take 1 tablet by mouth 2 times daily  vitamin D (ERGOCALCIFEROL) 1.25 MG (10504 UT) CAPS capsule, Take 1 capsule by mouth once a week  Handicap Placard [facility], by Does not apply route Issue parking placard for persons with disabilities. [facility] sec 4503.44. Expires in 5 years. Applicant meets the qualifying disability criteria. Cholecalciferol (VITAMIN D3) 90189 units CAPS, Take 1 capsule by mouth daily  docusate sodium (COLACE) 100 MG capsule, Take 1 capsule by mouth 2 times daily    Allergies:    Latex, Dilaudid [hydromorphone hcl], Hydromorphone, Medrol [methylprednisolone], and Prednisone    Social History:    reports that she quit smoking about 28 years ago. She has a 2.50 pack-year smoking history. She has never used smokeless tobacco. She reports current alcohol use. She reports that she does not use drugs. Family History:   family history includes Dementia in her father; Diabetes in her maternal aunt, maternal uncle, and mother; Other in her father and mother.     REVIEW OF SYSTEMS:  See HPI and problem list; otherwise no other new complaints with respect to HEENT, neck, pulmonary, coronary, GI, , endocrine, musculoskeletal, immune system/connective brain---2016---no acute changes  Lupus-----SLE   Chronic pain syndrome            Cervical radiculopathy           Left shoulder pain                Fibromyalgia  Depression---anxiety---adjustment disorder   Tobacco abuse----quit--.  Hypokalemia   PMH:   Irritable bowel syndrome, anemia, hyperopia--              astigmatism--presbyopia,  open angle glaucoma, H. pylori--              --treated, anemia------iron deficiency, dry eye syndrome,              SBO--abdominal pain--2016 twice, seizure--[id number], near syncope---              --dizziness, ocular migraines, SBO, Vitamin D deficiency, headaches  PSH:   appendectomy,  x 4, EDU-BSO--fibroids--,  BTL--              tubal ligation, EGD--PUD, colonoscopy--normal----              biopsy, D&C--uterus, colonoscopy------normal, EGD--biopsy--,               dental extractions      Allergies:         LATEX--swelling,    Intolerances:   Medrol--methylprednisolone--back pain,  prednisone--                          nausea-vomiting, Dilaudid--hydromorphone---nausea-vomiting    Code Status:  FULL CODE  OARRS----2021---[-]    PLAN:    1. Diverticulitis--with rectal bleeding----IV Cipro--Flagyl----IVF---nausea control---pain control [Toradol]---General Surgery--Dr. Kane Preciado  2. CLD  3. Bleeding---serial H&H  4. Home medications reviewed  5. HTN----dc lisinopril ----> Norvasc  6.    See orders     Note that over 50 minutes was spent in evaluation of the patient, review of the chart and pertinent records, discussion with family/staff, etc    Alicja Villarreal MD  5:45 PM  2021

## 2021-07-27 NOTE — TELEPHONE ENCOUNTER
Received call from Yanet ZIMMER . ([facility] with [employer]. Brief description of triage:   Patient states that at 9 pm last night she had diarrhea and cramping, states that it has gone on all night. Passing blood now in the toilet every ten minutes. Triage indicates for patient to call 911    Care advice provided, patient verbalizes understanding; denies any other questions or concerns; instructed to call back for any new or worsening symptoms. Attention Provider: Thank you for allowing me to participate in the care of your patient. The patient was connected to triage in response to information provided to the [facility]. Please do not respond through this encounter as the response is not directed to a shared pool. Reason for Disposition   Shock suspected (e.g., cold/pale/clammy skin, too weak to stand, low BP, rapid pulse)    Answer Assessment - Initial Assessment Questions  1. APPEARANCE of BLOOD: \"What color is it? \" \"Is it passed separately, on the surface of the stool, or mixed in with the stool? \"       Just blood and blood clots, red    2. AMOUNT: \"How much blood was passed? \"       States a quarter of a cup, it is in the bottom of the toilet    3. FREQUENCY: \"How many times has blood been passed with the stools? \"       See above note    4. ONSET: \"When was the blood first seen in the stools? \" (Days or weeks)      This morning    5. DIARRHEA: \"Is there also some diarrhea? \" If so, ask: \"How many diarrhea stools were passed in past 24 hours? \"       4    6. CONSTIPATION: \"Do you have constipation? \" If so, \"How bad is it? \"      No    7. RECURRENT SYMPTOMS: \"Have you had blood in your stools before? \" If so, ask: \"When was the last time? \" and \"What happened that time? \"       No    8. BLOOD THINNERS: \"Do you take any blood thinners? \" (e.g., Coumadin/warfarin, Pradaxa/dabigatran, aspirin)      No    9. OTHER SYMPTOMS: \"Do you have any other symptoms? \"  (e.g., abdominal pain, vomiting, dizziness, fever)     Abdominal pain    10. PREGNANCY: \"Is there any chance you are pregnant? \" \"When was your last menstrual period? \"        n/a    Protocols used: RECTAL BLEEDING-ADULT-[place]

## 2021-07-27 NOTE — ED PROVIDER NOTES
[facility]  eMERGENCY dEPARTMENT eNCOUnter      Pt Name: Vinay Vazquez  MRN: [id number]  [place] 1960  Date of evaluation: 7/27/2021      CHIEF COMPLAINT       Chief Complaint   Patient presents with    Rectal Bleeding         HISTORY OF PRESENT ILLNESS    Vinay Vazquez is a 56 y.o. female who presents with a complaint of abdominal pain and rectal bleeding that started last night with some nausea that her stomach started to hurt she said all through the night she had diarrhea she 5 turn the lights on this morning it was all blood. The pain is mostly left upper goes all the way down to her left lower quadrant there is been crampy nature to it she still feels nauseated but no chest pain or shortness of breath no fevers chills or cough she does have a history of diverticulitis but is never had to have surgery she has had a history of several C-sections and a hysterectomy      REVIEW OF SYSTEMS         Review of Systems   Constitutional: Positive for appetite change. Negative for chills and fever. HENT: Negative for congestion and ear pain. Eyes: Negative for pain and visual disturbance. Respiratory: Negative for cough and shortness of breath. Cardiovascular: Negative for chest pain, palpitations and leg swelling. Gastrointestinal: Positive for abdominal pain, anal bleeding, blood in stool, diarrhea, nausea and vomiting. Negative for constipation. Endocrine: Negative for polydipsia and polyuria. Genitourinary: Negative for difficulty urinating, dysuria, frequency, vaginal bleeding and vaginal discharge. Musculoskeletal: Negative for back pain, joint swelling, myalgias, neck pain and neck stiffness. Skin: Negative for rash. Neurological: Negative for dizziness, weakness and headaches. Hematological: Negative for adenopathy. Does not bruise/bleed easily. Psychiatric/Behavioral: Negative for confusion, self-injury and suicidal ideas.          PAST MEDICAL HISTORY    has a past medical history of Anxiety, Asthma, Cerebral artery occlusion with cerebral infarction [facility]), Cerebrovascular disease, Depression, Diverticulitis, Fibromyalgia, History of small bowel obstruction, Hypertension, Lupus ([facility] [address] and Seizures ([facility] [address] SURGICAL HISTORY      has a past surgical history that includes Appendectomy;  section; Hysterectomy (); Dilation and curettage of uterus; Tubal ligation; Colonoscopy (); Colonoscopy (2015); Tooth Extraction (3/1/16); other surgical history (Left, 2016); pr egd transoral biopsy single/multiple (N/A, 2017); and Colonoscopy (N/A, 2020). CURRENT MEDICATIONS       Previous Medications    CHOLECALCIFEROL (VITAMIN D3) 41845 UNITS CAPS    Take 1 capsule by mouth daily    CYCLOBENZAPRINE (FLEXERIL) 10 MG TABLET    Take 1 tablet by mouth 3 times daily as needed for Muscle spasms    DICLOFENAC SODIUM 1 % GEL    Apply 2 g topically 4 times daily    DICYCLOMINE (BENTYL) 10 MG CAPSULE    Take 1 capsule by mouth 4 times daily as needed (Cramps/Abdominal Pain)    DOCUSATE SODIUM (COLACE) 100 MG CAPSULE    Take 1 capsule by mouth 2 times daily    FERROUS SULFATE (FE TABS 325) 325 (65 FE) MG EC TABLET    Take 1 tablet by mouth daily (with breakfast)    HANDICAP PLACARD MISC    by Does not apply route Issue parking placard for persons with disabilities. [facility] sec 4503.44. Expires in 5 years. Applicant meets the qualifying disability criteria.     HYOSCYAMINE (LEVBID) 375 MCG EXTENDED RELEASE TABLET    Take 1 tablet by mouth every 12 hours as needed for Cramping    LISINOPRIL (PRINIVIL;ZESTRIL) 10 MG TABLET    Take 1 tablet by mouth daily    MAGNESIUM PO    Take by mouth    MULTIPLE VITAMINS-MINERALS (MULTI-VITAMIN/MINERALS PO)    Take 1 tablet by mouth 2 times daily    VITAMIN D (ERGOCALCIFEROL) 1.25 MG (77663 UT) CAPS CAPSULE    Take 1 capsule by mouth once a week       ALLERGIES     is allergic to latex, dilaudid [hydromorphone hcl], hydromorphone, medrol [methylprednisolone], and prednisone. FAMILY HISTORY     She indicated that her mother is . She indicated that her father is . She indicated that her maternal aunt is . She indicated that her maternal uncle is . She indicated that the status of her neg hx is unknown.     family history includes Dementia in her father; Diabetes in her maternal aunt, maternal uncle, and mother; Other in her father and mother. SOCIAL HISTORY      reports that she quit smoking about 28 years ago. She has a 2.50 pack-year smoking history. She has never used smokeless tobacco. She reports current alcohol use. She reports that she does not use drugs. PHYSICAL EXAM     INITIAL VITALS:  height is 5' (1.524 m) and weight is 175 lb (79.4 kg). Her tympanic temperature is 98.6 °F (37 °C). Her blood pressure is 165/104 (abnormal) and her pulse is 139. Her respiration is 20 and oxygen saturation is 94%. Physical Exam  Constitutional:       Appearance: She is well-developed. She is toxic-appearing. She is not diaphoretic. Comments: Uncomfortable appearing -American female holding her abdomen   HENT:      Head: Normocephalic and atraumatic. Eyes:      Conjunctiva/sclera: Conjunctivae normal.      Pupils: Pupils are equal, round, and reactive to light. Cardiovascular:      Rate and Rhythm: Normal rate and regular rhythm. Pulmonary:      Effort: Pulmonary effort is normal.      Breath sounds: Normal breath sounds. Abdominal:      General: Bowel sounds are normal.      Palpations: Abdomen is soft. Tenderness: There is abdominal tenderness. Comments: Diffuse tenderness over the entire abdomen most noticeable on the left side no rebound   Musculoskeletal:         General: No tenderness. Normal range of motion. Cervical back: Normal range of motion. Skin:     General: Skin is warm and dry.    Neurological:      Mental Status: She is alert and oriented to person, place, and time.   Psychiatric:         Mood and Affect: Mood normal.         Behavior: Behavior normal.           DIFFERENTIAL DIAGNOSIS/ MDM:     Abdominal pain with rectal bleeding we will do a diarrhea work-up for diverticulitis    DIAGNOSTIC RESULTS     EKG: All EKG's are interpreted by the Emergency Department Physician who either signs or Co-signs this chart in the absence of a cardiologist.        RADIOLOGY:   I directly visualized the following  images and reviewed the radiologist interpretations:     CT OF THE ABDOMEN AND PELVIS WITH CONTRAST 7/27/2021 1:39 pm       TECHNIQUE:   CT of the abdomen and pelvis was performed with the administration of   intravenous contrast. Multiplanar reformatted images are provided for review. Dose modulation, iterative reconstruction, and/or weight based adjustment of   the mA/kV was utilized to reduce the radiation dose to as low as reasonably   achievable.       COMPARISON:   None.       HISTORY:   ORDERING SYSTEM PROVIDED HISTORY: Abdominal pain bloody stools history of   diverticulitis   TECHNOLOGIST PROVIDED HISTORY:       Abdominal pain bloody stools history of diverticulitis   Decision Support Exception - unselect if not a suspected or confirmed   emergency medical condition->Emergency Medical Condition (MA)   Reason for Exam: Abdominal pain bloody stools history of diverticulitis   Acuity: Acute   Type of Exam: Initial       FINDINGS:   Lower Chest: The lung bases are without consolidation or effusion.  The   visualized cardiac structures are unremarkable.       Organs: The liver and spleen are normal size and overall attenuation.  There   are hepatic cysts.  The gallbladder, pancreas, and adrenal glands are   unremarkable.  The kidneys are without obstructive uropathy.  The ureters are   not dilated.  The urinary bladder is unremarkable.       GI/Bowel:  The stomach is unremarkable.  Loops of small bowel are normal in   caliber without evidence for obstruction. Dolly Hager is colonic wall thickening   involving the splenic flexure and descending colon.  There are uncomplicated   diverticula within the sigmoid region. [place] is no intraperitoneal free air   or free fluid.       Pelvis: The uterus is age-appropriate.       Peritoneum/Retroperitoneum: The psoas muscles are symmetric.  The abdominal   aorta is normal in caliber.  The inferior vena cava is unremarkable. [place]   is no retroperitoneal adenopathy.  There are few prominent left mesenteric   lymph nodes.       Bones/Soft Tissues:  The extra-abdominal soft tissues are unremarkable. [place]   is no acute osseous abnormality.           Impression   Colonic wall thickening involving the splenic flexure and descending colon   consistent with an infectious or inflammatory colitis.  Associated reactive   mesenteric adenopathy.       Uncomplicated diverticulosis of the sigmoid colon.                 ED BEDSIDE ULTRASOUND:       LABS:  Labs Reviewed   COMPREHENSIVE METABOLIC PANEL - Abnormal; Notable for the following components:       Result Value    Glucose 109 (*)     Total Bilirubin 0.25 (*)     Total Protein 8.5 (*)     All other components within normal limits   CBC WITH AUTO DIFFERENTIAL - Abnormal; Notable for the following components:    WBC 12.9 (*)     Hemoglobin 11.5 (*)     MCV 80.9 (*)     MCH 24.5 (*)     RDW 15.2 (*)     Seg Neutrophils 79 (*)     Lymphocytes 14 (*)     Segs Absolute 10.12 (*)     All other components within normal limits   LACTIC ACID   LIPASE   AMYLASE   URINALYSIS           EMERGENCY DEPARTMENT COURSE:   Vitals:    Vitals:    07/27/21 1226 07/27/21 1230 07/27/21 1315 07/27/21 1330   BP: (!) 169/102 (!) 166/90 (!) 161/95 (!) 165/104   Pulse: 139      Resp: 20      Temp: 98.6 °F (37 °C)      TempSrc: Tympanic      SpO2: 95% 97% 95% 94%   Weight: 175 lb (79.4 kg)      Height: 5' (1.524 m)        -------------------------  BP: (!) 165/104, Temp: 98.6 °F (37 °C), Pulse: 139, Resp: 20        Re-evaluation Notes    Patient resting comfortably    CRITICAL CARE:   IP CONSULT TO HOSPITALIST        CONSULTS:      PROCEDURES:  None    FINAL IMPRESSION      1. Colitis    2. Rectal bleed          DISPOSITION/PLAN   DISPOSITION admitted    Condition on Disposition    Stable    PATIENT REFERRED TO:  No follow-up provider specified. DISCHARGE MEDICATIONS:  New Prescriptions    No medications on file       (Please note that portions of this note were completed with a voice recognition program.  Efforts were made to edit the dictations but occasionally words are mis-transcribed.)    Rosanna Rojas MD,, MD, F.A.A.E.M.   Attending Emergency Physician                          Rosanna Rojas MD  07/27/21 [id number]

## 2021-07-27 NOTE — PLAN OF CARE
Problem: Activity:  Goal: Risk for activity intolerance will decrease  Description: Risk for activity intolerance will decrease  Outcome: Ongoing     Problem:  Bowel/Gastric:  Goal: Bowel function will improve  Description: Bowel function will improve  Outcome: Ongoing  Goal: Diagnostic test results will improve  Description: Diagnostic test results will improve  Outcome: Ongoing  Goal: Occurrences of nausea will decrease  Description: Occurrences of nausea will decrease  Outcome: Ongoing  Goal: Occurrences of vomiting will decrease  Description: Occurrences of vomiting will decrease  Outcome: Ongoing     Problem: Fluid Volume:  Goal: Maintenance of adequate hydration will improve  Description: Maintenance of adequate hydration will improve  Outcome: Ongoing     Problem: Health Behavior:  Goal: Ability to state signs and symptoms to report to health care provider will improve  Description: Ability to state signs and symptoms to report to health care provider will improve  Outcome: Ongoing     Problem: Physical Regulation:  Goal: Complications related to the disease process, condition or treatment will be avoided or minimized  Description: Complications related to the disease process, condition or treatment will be avoided or minimized  Outcome: Ongoing  Goal: Ability to maintain clinical measurements within normal limits will improve  Description: Ability to maintain clinical measurements within normal limits will improve  Outcome: Ongoing     Problem: Sensory:  Goal: Ability to identify factors that increase the pain will improve  Description: Ability to identify factors that increase the pain will improve  Outcome: Ongoing  Goal: Ability to notify healthcare provider of pain before it becomes unmanageable or unbearable will improve  Description: Ability to notify healthcare provider of pain before it becomes unmanageable or unbearable will improve  Outcome: Ongoing  Goal: Pain level will decrease  Description: Pain level will decrease  Outcome: Ongoing     Problem: Discharge Planning:  Goal: Discharged to appropriate level of care  Description: Discharged to appropriate level of care  Outcome: Ongoing     Problem: Bowel Function - Altered:  Goal: Bowel elimination is within specified parameters  Description: Bowel elimination is within specified parameters  Outcome: Ongoing     Problem: Fluid Volume - Imbalance:  Goal: Will show no signs and symptoms of excessive bleeding  Description: Will show no signs and symptoms of excessive bleeding  Outcome: Ongoing  Goal: Absence of imbalanced fluid volume signs and symptoms  Description: Absence of imbalanced fluid volume signs and symptoms  Outcome: Ongoing     Problem: Nausea/Vomiting:  Goal: Absence of nausea/vomiting  Description: Absence of nausea/vomiting  Outcome: Ongoing  Goal: Able to drink  Description: Able to drink  Outcome: Ongoing  Goal: Able to eat  Description: Able to eat  Outcome: Ongoing  Goal: Ability to achieve adequate nutritional intake will improve  Description: Ability to achieve adequate nutritional intake will improve  Outcome: Ongoing     Problem: Pain:  Description: Pain management should include both nonpharmacologic and pharmacologic interventions.   Goal: Pain level will decrease  Description: Pain level will decrease  Outcome: Ongoing  Goal: Control of acute pain  Description: Control of acute pain  Outcome: Ongoing  Goal: Control of chronic pain  Description: Control of chronic pain  Outcome: Ongoing     Problem: Falls - Risk of:  Goal: Will remain free from falls  Description: Will remain free from falls  Outcome: Ongoing  Goal: Absence of physical injury  Description: Absence of physical injury  Outcome: Ongoing

## 2021-07-28 LAB
ABSOLUTE EOS #: 0.23 K/UL (ref 0–0.44)
ABSOLUTE IMMATURE GRANULOCYTE: <0.03 K/UL (ref 0–0.3)
ABSOLUTE LYMPH #: 1.86 K/UL (ref 1.1–3.7)
ABSOLUTE MONO #: 0.72 K/UL (ref 0.1–1.2)
ANION GAP SERPL CALCULATED.3IONS-SCNC: 9 MMOL/L (ref 9–17)
BASOPHILS # BLD: 0 % (ref 0–2)
BASOPHILS ABSOLUTE: 0.03 K/UL (ref 0–0.2)
BUN BLDV-MCNC: 7 MG/DL (ref 8–23)
BUN/CREAT BLD: 9 (ref 9–20)
C DIFF AG + TOXIN: NEGATIVE
CALCIUM SERPL-MCNC: 8.5 MG/DL (ref 8.6–10.4)
CHLORIDE BLD-SCNC: 105 MMOL/L (ref 98–107)
CO2: 25 MMOL/L (ref 20–31)
CREAT SERPL-MCNC: 0.75 MG/DL (ref 0.5–0.9)
DIFFERENTIAL TYPE: ABNORMAL
EKG ATRIAL RATE: 81 BPM
EKG P AXIS: 37 DEGREES
EKG P-R INTERVAL: 132 MS
EKG Q-T INTERVAL: 398 MS
EKG QRS DURATION: 76 MS
EKG QTC CALCULATION (BAZETT): 462 MS
EKG R AXIS: 18 DEGREES
EKG T AXIS: 41 DEGREES
EKG VENTRICULAR RATE: 81 BPM
EOSINOPHILS RELATIVE PERCENT: 3 % (ref 1–4)
GFR AFRICAN AMERICAN: >60 ML/MIN
GFR NON-AFRICAN AMERICAN: >60 ML/MIN
GFR SERPL CREATININE-BSD FRML MDRD: ABNORMAL ML/MIN/{1.73_M2}
GFR SERPL CREATININE-BSD FRML MDRD: ABNORMAL ML/MIN/{1.73_M2}
GLUCOSE BLD-MCNC: 104 MG/DL (ref 70–99)
HCT VFR BLD CALC: 32.2 % (ref 36.3–47.1)
HEMOGLOBIN: 9.7 G/DL (ref 11.9–15.1)
IMMATURE GRANULOCYTES: 0 %
LYMPHOCYTES # BLD: 28 % (ref 24–43)
MCH RBC QN AUTO: 24.7 PG (ref 25.2–33.5)
MCHC RBC AUTO-ENTMCNC: 30.1 G/DL (ref 25.2–33.5)
MCV RBC AUTO: 82.1 FL (ref 82.6–102.9)
MONOCYTES # BLD: 11 % (ref 3–12)
NRBC AUTOMATED: 0 PER 100 WBC
PDW BLD-RTO: 15.3 % (ref 11.8–14.4)
PLATELET # BLD: 336 K/UL (ref 138–453)
PLATELET ESTIMATE: ABNORMAL
PMV BLD AUTO: 9.8 FL (ref 8.1–13.5)
POTASSIUM SERPL-SCNC: 4.1 MMOL/L (ref 3.7–5.3)
RBC # BLD: 3.92 M/UL (ref 3.95–5.11)
RBC # BLD: ABNORMAL 10*6/UL
SEG NEUTROPHILS: 58 % (ref 36–65)
SEGMENTED NEUTROPHILS ABSOLUTE COUNT: 3.87 K/UL (ref 1.5–8.1)
SODIUM BLD-SCNC: 139 MMOL/L (ref 135–144)
SPECIMEN DESCRIPTION: NORMAL
WBC # BLD: 6.7 K/UL (ref 3.5–11.3)
WBC # BLD: ABNORMAL 10*3/UL

## 2021-07-28 PROCEDURE — 6370000000 HC RX 637 (ALT 250 FOR IP): Performed by: INTERNAL MEDICINE

## 2021-07-28 PROCEDURE — 6360000002 HC RX W HCPCS: Performed by: INTERNAL MEDICINE

## 2021-07-28 PROCEDURE — 2580000003 HC RX 258: Performed by: INTERNAL MEDICINE

## 2021-07-28 PROCEDURE — 93005 ELECTROCARDIOGRAM TRACING: CPT

## 2021-07-28 PROCEDURE — 94760 N-INVAS EAR/PLS OXIMETRY 1: CPT

## 2021-07-28 PROCEDURE — 6370000000 HC RX 637 (ALT 250 FOR IP)

## 2021-07-28 PROCEDURE — 2500000003 HC RX 250 WO HCPCS: Performed by: INTERNAL MEDICINE

## 2021-07-28 PROCEDURE — 2060000000 HC ICU INTERMEDIATE R&B

## 2021-07-28 PROCEDURE — 99222 1ST HOSP IP/OBS MODERATE 55: CPT | Performed by: SURGERY

## 2021-07-28 PROCEDURE — 36415 COLL VENOUS BLD VENIPUNCTURE: CPT

## 2021-07-28 PROCEDURE — 80048 BASIC METABOLIC PNL TOTAL CA: CPT

## 2021-07-28 PROCEDURE — 6370000000 HC RX 637 (ALT 250 FOR IP): Performed by: SURGERY

## 2021-07-28 PROCEDURE — 85025 COMPLETE CBC W/AUTO DIFF WBC: CPT

## 2021-07-28 PROCEDURE — 99231 SBSQ HOSP IP/OBS SF/LOW 25: CPT | Performed by: INTERNAL MEDICINE

## 2021-07-28 RX ORDER — POLYETHYLENE GLYCOL 3350, SODIUM CHLORIDE, SODIUM BICARBONATE, POTASSIUM CHLORIDE 420; 11.2; 5.72; 1.48 G/4L; G/4L; G/4L; G/4L
4000 POWDER, FOR SOLUTION ORAL ONCE
Status: DISCONTINUED | OUTPATIENT
Start: 2021-07-28 | End: 2021-07-29

## 2021-07-28 RX ADMIN — CIPROFLOXACIN 400 MG: 2 INJECTION, SOLUTION INTRAVENOUS at 14:41

## 2021-07-28 RX ADMIN — SODIUM CHLORIDE: 9 INJECTION, SOLUTION INTRAVENOUS at 11:59

## 2021-07-28 RX ADMIN — METRONIDAZOLE 500 MG: 500 INJECTION, SOLUTION INTRAVENOUS at 06:10

## 2021-07-28 RX ADMIN — SODIUM CHLORIDE: 9 INJECTION, SOLUTION INTRAVENOUS at 22:48

## 2021-07-28 RX ADMIN — CIPROFLOXACIN 400 MG: 2 INJECTION, SOLUTION INTRAVENOUS at 02:44

## 2021-07-28 RX ADMIN — AMLODIPINE BESYLATE 10 MG: 5 TABLET ORAL at 08:54

## 2021-07-28 RX ADMIN — KETOROLAC TROMETHAMINE 30 MG: 30 INJECTION, SOLUTION INTRAMUSCULAR; INTRAVENOUS at 14:48

## 2021-07-28 RX ADMIN — METAXALONE 800 MG: 800 TABLET ORAL at 20:42

## 2021-07-28 RX ADMIN — METAXALONE 800 MG: 800 TABLET ORAL at 08:54

## 2021-07-28 RX ADMIN — POLYETHYLENE GLYCOL-3350 AND ELECTROLYTES 4000 ML: 236; 6.74; 5.86; 2.97; 22.74 POWDER, FOR SOLUTION ORAL at 16:43

## 2021-07-28 RX ADMIN — DICYCLOMINE HYDROCHLORIDE 10 MG: 10 CAPSULE ORAL at 20:42

## 2021-07-28 RX ADMIN — METRONIDAZOLE 500 MG: 500 INJECTION, SOLUTION INTRAVENOUS at 22:00

## 2021-07-28 RX ADMIN — METAXALONE 800 MG: 800 TABLET ORAL at 13:51

## 2021-07-28 RX ADMIN — METRONIDAZOLE 500 MG: 500 INJECTION, SOLUTION INTRAVENOUS at 13:51

## 2021-07-28 RX ADMIN — BISACODYL 10 MG: 5 TABLET, COATED ORAL at 16:43

## 2021-07-28 ASSESSMENT — PAIN SCALES - GENERAL
PAINLEVEL_OUTOF10: 5
PAINLEVEL_OUTOF10: 8
PAINLEVEL_OUTOF10: 7
PAINLEVEL_OUTOF10: 0
PAINLEVEL_OUTOF10: 5
PAINLEVEL_OUTOF10: 8

## 2021-07-28 ASSESSMENT — PAIN DESCRIPTION - DESCRIPTORS
DESCRIPTORS: CRAMPING

## 2021-07-28 ASSESSMENT — ENCOUNTER SYMPTOMS
ABDOMINAL PAIN: 1
BACK PAIN: 0
COUGH: 0
CHOKING: 0
SHORTNESS OF BREATH: 0
NAUSEA: 1
SORE THROAT: 0
CHEST TIGHTNESS: 0
DIARRHEA: 1
HEMATOCHEZIA: 1
VOMITING: 1
TROUBLE SWALLOWING: 0
VOICE CHANGE: 0

## 2021-07-28 ASSESSMENT — PAIN DESCRIPTION - ORIENTATION
ORIENTATION: LEFT
ORIENTATION: LEFT;UPPER

## 2021-07-28 ASSESSMENT — PAIN DESCRIPTION - PROGRESSION
CLINICAL_PROGRESSION: GRADUALLY IMPROVING
CLINICAL_PROGRESSION: NOT CHANGED
CLINICAL_PROGRESSION: GRADUALLY IMPROVING
CLINICAL_PROGRESSION: GRADUALLY IMPROVING

## 2021-07-28 ASSESSMENT — PAIN DESCRIPTION - LOCATION
LOCATION: ABDOMEN

## 2021-07-28 ASSESSMENT — PAIN DESCRIPTION - FREQUENCY
FREQUENCY: OTHER (COMMENT)
FREQUENCY: INTERMITTENT
FREQUENCY: OTHER (COMMENT)
FREQUENCY: INTERMITTENT

## 2021-07-28 ASSESSMENT — PAIN DESCRIPTION - PAIN TYPE
TYPE: ACUTE PAIN

## 2021-07-28 ASSESSMENT — PAIN - FUNCTIONAL ASSESSMENT: PAIN_FUNCTIONAL_ASSESSMENT: ACTIVITIES ARE NOT PREVENTED

## 2021-07-28 ASSESSMENT — PAIN SCALES - WONG BAKER
WONGBAKER_NUMERICALRESPONSE: 0

## 2021-07-28 NOTE — PLAN OF CARE
Problem: Activity:  Goal: Risk for activity intolerance will decrease  Description: Risk for activity intolerance will decrease  Outcome: Ongoing     Problem:  Bowel/Gastric:  Goal: Bowel function will improve  Description: Bowel function will improve  Outcome: Ongoing  Goal: Diagnostic test results will improve  Description: Diagnostic test results will improve  Outcome: Ongoing  Goal: Occurrences of nausea will decrease  Description: Occurrences of nausea will decrease  Outcome: Ongoing  Goal: Occurrences of vomiting will decrease  Description: Occurrences of vomiting will decrease  Outcome: Ongoing     Problem: Fluid Volume:  Goal: Maintenance of adequate hydration will improve  Description: Maintenance of adequate hydration will improve  Outcome: Ongoing     Problem: Health Behavior:  Goal: Ability to state signs and symptoms to report to health care provider will improve  Description: Ability to state signs and symptoms to report to health care provider will improve  Outcome: Ongoing     Problem: Physical Regulation:  Goal: Complications related to the disease process, condition or treatment will be avoided or minimized  Description: Complications related to the disease process, condition or treatment will be avoided or minimized  Outcome: Ongoing  Goal: Ability to maintain clinical measurements within normal limits will improve  Description: Ability to maintain clinical measurements within normal limits will improve  Outcome: Ongoing     Problem: Sensory:  Goal: Ability to identify factors that increase the pain will improve  Description: Ability to identify factors that increase the pain will improve  Outcome: Ongoing  Goal: Ability to notify healthcare provider of pain before it becomes unmanageable or unbearable will improve  Description: Ability to notify healthcare provider of pain before it becomes unmanageable or unbearable will improve  Outcome: Ongoing  Goal: Pain level will decrease  Description: Pain level will decrease  Outcome: Ongoing     Problem: Discharge Planning:  Goal: Discharged to appropriate level of care  Description: Discharged to appropriate level of care  7/28/2021 1739 by Dre Kaur RN  Outcome: Ongoing  7/28/2021 1246 by Claudell Maid, RN  Outcome: Met This Shift     Problem: Fluid Volume - Imbalance:  Goal: Will show no signs and symptoms of excessive bleeding  Description: Will show no signs and symptoms of excessive bleeding  Outcome: Ongoing  Goal: Absence of imbalanced fluid volume signs and symptoms  Description: Absence of imbalanced fluid volume signs and symptoms  Outcome: Ongoing     Problem: Bowel Function - Altered:  Goal: Bowel elimination is within specified parameters  Description: Bowel elimination is within specified parameters  Outcome: Ongoing     Problem: Nausea/Vomiting:  Goal: Absence of nausea/vomiting  Description: Absence of nausea/vomiting  Outcome: Ongoing  Goal: Able to drink  Description: Able to drink  Outcome: Ongoing  Goal: Able to eat  Description: Able to eat  Outcome: Ongoing  Goal: Ability to achieve adequate nutritional intake will improve  Description: Ability to achieve adequate nutritional intake will improve  Outcome: Ongoing     Problem: Pain:  Description: Pain management should include both nonpharmacologic and pharmacologic interventions.   Goal: Pain level will decrease  Description: Pain level will decrease  Outcome: Ongoing  Goal: Control of acute pain  Description: Control of acute pain  Outcome: Ongoing  Goal: Control of chronic pain  Description: Control of chronic pain  Outcome: Ongoing     Problem: Falls - Risk of:  Goal: Will remain free from falls  Description: Will remain free from falls  Outcome: Ongoing  Goal: Absence of physical injury  Description: Absence of physical injury  Outcome: Ongoing

## 2021-07-28 NOTE — PROGRESS NOTES
Hospitalist Progress Note    Patient:  Brian Ramirez     YOB: 1960    MRN: [id number]   Admit date: 7/27/2021     Acct: [de-identified]     PCP: DO VARUN Monge--Interval History:   Diverticulitis--possible ischemic colitis---improved---markedly decreased pain---seen by General Surgery----planned colonoscopy----7.29.2021    HTN----113/65    Asthma---quiescent    Chronic pain syndrome---controlled    See note below     All other ROS negative except noted in HPI    Diet:  ADULT DIET;  Clear Liquid  Diet NPO Exceptions are: Sips of Water with Meds    Medications:  Scheduled Meds:   polyethylene glycol-electrolytes  4,000 mL Oral Once    bisacodyl  10 mg Oral Once    polyethylene glycol        sodium chloride flush  10 mL Intravenous 2 times per day    metroNIDAZOLE  500 mg Intravenous Q8H    metaxalone  800 mg Oral TID    ciprofloxacin  400 mg Intravenous Q12H    amLODIPine  10 mg Oral Daily     Continuous Infusions:   sodium chloride      sodium chloride 125 mL/hr at 07/28/21 1159     PRN Meds:sodium chloride flush, sodium chloride, acetaminophen, ondansetron, ketorolac, dicyclomine    Objective:  Labs:  CBC with Differential:    Lab Results   Component Value Date    WBC 6.7 07/28/2021    RBC 3.92 07/28/2021    HGB 9.7 07/28/2021    HCT 32.2 07/28/2021     07/28/2021    MCV 82.1 07/28/2021    MCH 24.7 07/28/2021    MCHC 30.1 07/28/2021    RDW 15.3 07/28/2021    LYMPHOPCT 28 07/28/2021    MONOPCT 11 07/28/2021    BASOPCT 0 07/28/2021    MONOSABS 0.72 07/28/2021    LYMPHSABS 1.86 07/28/2021    EOSABS 0.23 07/28/2021    BASOSABS 0.03 07/28/2021    DIFFTYPE NOT REPORTED 07/28/2021     BMP:    Lab Results   Component Value Date     07/28/2021    K 4.1 07/28/2021     07/28/2021    CO2 25 07/28/2021    BUN 7 07/28/2021    LABALBU 4.5 07/27/2021    CREATININE 0.75 07/28/2021    CALCIUM 8.5 07/28/2021    GFRAA >60 07/28/2021    LABGLOM >60 07/28/2021    GLUCOSE 104 07/28/2021 Physical Exam:  Vitals: /73   Pulse 91   Temp 97.3 °F (36.3 °C) (Tympanic)   Resp 22   Ht 5' (1.524 m)   Wt 184 lb 3.2 oz (83.6 kg)   SpO2 95%   BMI 35.97 kg/m²   24 hour intake/output:    Intake/Output Summary (Last 24 hours) at 7/28/2021 1440  Last data filed at 7/28/2021 1243  Gross per 24 hour   Intake 3573 ml   Output --   Net 3573 ml     Last 3 weights: Wt Readings from Last 3 Encounters:   07/28/21 184 lb 3.2 oz (83.6 kg)   07/08/21 177 lb (80.3 kg)   07/04/21 170 lb (77.1 kg)     HEENT: Normocephalic and Atraumatic  Neck: Supple, No Masses, Tenderness, Nodularity and No Lymphadenopathy  Chest/Lungs: Clear to Auscultation without Rales, Rhonchi, or Wheezes  Cardiac: Regular Rate and Rhythm  GI/Abdomen: Bowel Sounds Present and Soft,  without Guarding or Rebound Tenderness  : Not examined  EXT/Skin: No Edema, No Cyanosis and No Clubbing  Neuro: alert----generalzied weakness      Assessment:    Active Problems:    Colitis    Diverticulitis  Resolved Problems:    * No resolved hospital problems.  *    SHARON SUGGS       56 AAF  [merlin Ballard, FP;  [facility]---[facility]; Laura Chance, SHANKAR]                           FULL CODE     LOVENOX    COVID-19---negative----no vaccine    Anti-infectives:   Cipro IV, Flagyl IV    Planned colonoscopy----7.29.2021---[facility]  Diverticulitis---acute----sigmoid---with rectal bleeding----7.27.2021----leukocytosis---nausea---                             vomiting----consider ischemic colitis       CT abdomen-pelvis---7.27.2021---colonic wall thickening involving                            the splenic flexure and descending colon consistent with                             an infectious or inflammatory colitis----associated reactive                            mesenteric adenopathy       Diverticulitis----history     Hypertension         EKG----10.12.2020---NSR---88---NACs         Chest pain---11.19.2018         MI ruled out---11.20.2018         CT chest

## 2021-07-28 NOTE — CARE COORDINATION
07/28/21 1251   Condition of Participation: Discharge Planning   The Plan for Transition of Care is related to the following treatment goals: Home no needs   The Patient and/or Patient Representative was provided with a Choice of Provider? Patient   The Patient and/Or Patient Representative agree with the Discharge Plan? Yes   Freedom of Choice list was provided with basic dialogue that supports the patient's individualized plan of care/goals, treatment preferences, and shares the quality data associated with the providers? Yes   Business card provided, denies needs at discharge. Voices understanding to notify DC planning if needs arise before discharge.

## 2021-07-28 NOTE — PLAN OF CARE
Problem: Activity:  Goal: Risk for activity intolerance will decrease  Description: Risk for activity intolerance will decrease  7/27/2021 2308 by Rimma Santos RN  Outcome: Ongoing  7/27/2021 1741 by Gordon Lyon RN  Outcome: Ongoing     Problem:  Bowel/Gastric:  Goal: Bowel function will improve  Description: Bowel function will improve  7/27/2021 2308 by Rimma Santos RN  Outcome: Ongoing  7/27/2021 1741 by Gordon Lyon RN  Outcome: Ongoing  Goal: Diagnostic test results will improve  Description: Diagnostic test results will improve  7/27/2021 2308 by Rimma Santos RN  Outcome: Ongoing  7/27/2021 1741 by Gordon Lyon RN  Outcome: Ongoing  Goal: Occurrences of nausea will decrease  Description: Occurrences of nausea will decrease  7/27/2021 2308 by Rimma Santos RN  Outcome: Ongoing  7/27/2021 1741 by Gordon Lyon RN  Outcome: Ongoing  Goal: Occurrences of vomiting will decrease  Description: Occurrences of vomiting will decrease  7/27/2021 2308 by Rimma Santos RN  Outcome: Ongoing  7/27/2021 1741 by Gordon Lyon RN  Outcome: Ongoing     Problem: Fluid Volume:  Goal: Maintenance of adequate hydration will improve  Description: Maintenance of adequate hydration will improve  7/27/2021 2308 by Rimma Santos RN  Outcome: Ongoing  7/27/2021 1741 by Gordon Lyon RN  Outcome: Ongoing     Problem: Health Behavior:  Goal: Ability to state signs and symptoms to report to health care provider will improve  Description: Ability to state signs and symptoms to report to health care provider will improve  7/27/2021 2308 by Rimma Santos RN  Outcome: Ongoing  7/27/2021 1741 by Gordon Lyon RN  Outcome: Ongoing     Problem: Physical Regulation:  Goal: Complications related to the disease process, condition or treatment will be avoided or minimized  Description: Complications related to the disease process, condition or treatment will be avoided or minimized  7/27/2021 2308 by Doreen Adam RN  Outcome: Ongoing  7/27/2021 1741 by Mary Ellen Soares RN  Outcome: Ongoing  Goal: Ability to maintain clinical measurements within normal limits will improve  Description: Ability to maintain clinical measurements within normal limits will improve  7/27/2021 2308 by Doreen Adam RN  Outcome: Ongoing  7/27/2021 1741 by Mary Ellen Soares RN  Outcome: Ongoing     Problem: Sensory:  Goal: Ability to identify factors that increase the pain will improve  Description: Ability to identify factors that increase the pain will improve  7/27/2021 2308 by Doreen Adam RN  Outcome: Ongoing  7/27/2021 1741 by Mary Ellen Soares RN  Outcome: Ongoing  Goal: Ability to notify healthcare provider of pain before it becomes unmanageable or unbearable will improve  Description: Ability to notify healthcare provider of pain before it becomes unmanageable or unbearable will improve  7/27/2021 2308 by Doreen Adam RN  Outcome: Ongoing  7/27/2021 1741 by Mary Ellen Soares RN  Outcome: Ongoing  Goal: Pain level will decrease  Description: Pain level will decrease  7/27/2021 2308 by Doreen Adam RN  Outcome: Ongoing  7/27/2021 1741 by Mary Ellen Soares RN  Outcome: Ongoing     Problem: Discharge Planning:  Goal: Discharged to appropriate level of care  Description: Discharged to appropriate level of care  7/27/2021 2308 by Doreen Adam RN  Outcome: Ongoing  7/27/2021 1741 by Mary Ellen Soares RN  Outcome: Ongoing     Problem:  Bowel Function - Altered:  Goal: Bowel elimination is within specified parameters  Description: Bowel elimination is within specified parameters  7/27/2021 2308 by Doreen Adam RN  Outcome: Ongoing  7/27/2021 1741 by Mary Ellen Soares RN  Outcome: Ongoing     Problem: Fluid Volume - Imbalance:  Goal: Will show no signs and symptoms of excessive bleeding  Description: Will show no signs and symptoms of excessive bleeding  7/27/2021 2308 by Doreen Adam RN  Outcome: Ongoing  7/27/2021 1741 by Shirleen Mcburney, RN  Outcome: Ongoing  Goal: Absence of imbalanced fluid volume signs and symptoms  Description: Absence of imbalanced fluid volume signs and symptoms  7/27/2021 2308 by Suzanne Lind RN  Outcome: Ongoing  7/27/2021 1741 by Shirleen Mcburney, RN  Outcome: Ongoing     Problem: Nausea/Vomiting:  Goal: Absence of nausea/vomiting  Description: Absence of nausea/vomiting  7/27/2021 2308 by Suzanne Lind RN  Outcome: Ongoing  7/27/2021 1741 by Shirleen Mcburney, RN  Outcome: Ongoing  Goal: Able to drink  Description: Able to drink  7/27/2021 2308 by Suzanne Lind RN  Outcome: Ongoing  7/27/2021 1741 by Shirleen Mcburney, RN  Outcome: Ongoing  Goal: Able to eat  Description: Able to eat  7/27/2021 2308 by Suzanne Lind RN  Outcome: Ongoing  7/27/2021 1741 by Shirleen Mcburney, RN  Outcome: Ongoing  Goal: Ability to achieve adequate nutritional intake will improve  Description: Ability to achieve adequate nutritional intake will improve  7/27/2021 2308 by Suzanne Lind RN  Outcome: Ongoing  7/27/2021 1741 by Shirleen Mcburney, RN  Outcome: Ongoing     Problem: Pain:  Goal: Pain level will decrease  Description: Pain level will decrease  7/27/2021 2308 by Suzanne Lind RN  Outcome: Ongoing  7/27/2021 1741 by Shirleen Mcburney, RN  Outcome: Ongoing  Goal: Control of acute pain  Description: Control of acute pain  7/27/2021 2308 by Suzanne Lind RN  Outcome: Ongoing  7/27/2021 1741 by Shirleen Mcburney, RN  Outcome: Ongoing  Goal: Control of chronic pain  Description: Control of chronic pain  7/27/2021 2308 by Suzanne Lind RN  Outcome: Ongoing  7/27/2021 1741 by Shirleen Mcburney, RN  Outcome: Ongoing     Problem: Falls - Risk of:  Goal: Will remain free from falls  Description: Will remain free from falls  7/27/2021 2308 by Suzanne Lind RN  Outcome: Ongoing  7/27/2021 1741 by Shirleen Mcburney, RN  Outcome: Ongoing  Goal: Absence of physical injury  Description: Absence of physical injury  7/27/2021 2308 by Gene Irizarry RN  Outcome: Ongoing  7/27/2021 1741 by Dot Bejarano RN  Outcome: Ongoing

## 2021-07-28 NOTE — PROGRESS NOTES
SW completed a Psychosocial Assessment for evaluation of patient's mental health, social status, and functional capacity within the community. Patient is a 56year old female admitted due to diverticulitis. Patient was alert, oriented, and engaging during assessment. Patient lives alone in [place]. Patient discussed being  for \"11 years\" and has positive support from friends and family members. Patient discussed positive spiritual beliefs and values. Patient denies using outside community services or DMEs. SW provided supportive listening while patient discussed past medical history and events leading up to hospitalization. Patient has PCP Jonathan Kaur DO and reports no issues affording her medication. SW assessed ADLs and means of transportation. Patient states she is independent in her ADLs and able to transport herself. SW assessed if patient had food insecurity or needed financial assistance. Patient receives disability, denies any food insecurity, or financial concerns at this time. Patient is a full code status and discussed her health and thoughts against being a DNR-CCA. She does not have an Advance Directive. SW discussed an Advance Directive which included the patient's choices for care and treatment in the case of a health event that adversely affects decision-making abilities. SW provided education and resources. Patient has no additional questions at this time and has agreed to contact SW should anything change. Patient denies discharge needs or further services at this time. SW provided business card. SW will continue to monitor needs and assist as appropriate.         Electronically signed by RUBY Bean on 7/28/2021 at 3:59 PM

## 2021-07-28 NOTE — FLOWSHEET NOTE
rounding on PCU    Assessment: Patient contacted  by phone requesting a bible.  determined the appropriate translation for her and will bring one. Patient is awake and oriented. She did not expect admission but is accepting and cooperative. She is a woman of aviva and and articulates her aviva journey openly and clearly. She loves to study Scripture both in structured formal classes and informal discussion and private study     Intervention: Engaged in conversation and active discussion in which  exercised active listening.  prayed with patient. Patient expressed appreciation for visit and offer of continued prayer.     Plan: Chaplains are available on site or on call 24/7 for spiritual and emotional support.     07/28/21 1323   Encounter Summary   Services provided to: Patient   Referral/Consult From: Patient   Support System Family members   Continue Visiting   (7/28/21)   Complexity of Encounter Moderate   Length of Encounter 45 minutes   Spiritual Assessment Completed Yes   Spiritual/Moravian   Type Spiritual support   Assessment Approachable   Intervention Active listening;Explored feelings, thoughts, concerns;Prayer;Provided reading materials/devotional materials   Outcome Expressed gratitude;Expressed feelings of carson, peace, and/or awe;Engaged in conversation

## 2021-07-29 ENCOUNTER — ANESTHESIA (OUTPATIENT)
Dept: OPERATING ROOM | Age: 61
DRG: 392 | End: 2021-07-29
Payer: MEDICARE

## 2021-07-29 ENCOUNTER — ANESTHESIA EVENT (OUTPATIENT)
Dept: OPERATING ROOM | Age: 61
DRG: 392 | End: 2021-07-29
Payer: MEDICARE

## 2021-07-29 ENCOUNTER — APPOINTMENT (OUTPATIENT)
Dept: GENERAL RADIOLOGY | Age: 61
DRG: 392 | End: 2021-07-29
Payer: MEDICARE

## 2021-07-29 VITALS — OXYGEN SATURATION: 96 % | SYSTOLIC BLOOD PRESSURE: 130 MMHG | DIASTOLIC BLOOD PRESSURE: 59 MMHG

## 2021-07-29 LAB
ABSOLUTE EOS #: 0.24 K/UL (ref 0–0.44)
ABSOLUTE IMMATURE GRANULOCYTE: <0.03 K/UL (ref 0–0.3)
ABSOLUTE LYMPH #: 1.42 K/UL (ref 1.1–3.7)
ABSOLUTE MONO #: 0.64 K/UL (ref 0.1–1.2)
ANION GAP SERPL CALCULATED.3IONS-SCNC: 9 MMOL/L (ref 9–17)
BASOPHILS # BLD: 1 % (ref 0–2)
BASOPHILS ABSOLUTE: 0.03 K/UL (ref 0–0.2)
BUN BLDV-MCNC: 4 MG/DL (ref 8–23)
BUN/CREAT BLD: 6 (ref 9–20)
CALCIUM SERPL-MCNC: 8.8 MG/DL (ref 8.6–10.4)
CHLORIDE BLD-SCNC: 105 MMOL/L (ref 98–107)
CO2: 25 MMOL/L (ref 20–31)
CREAT SERPL-MCNC: 0.66 MG/DL (ref 0.5–0.9)
DIFFERENTIAL TYPE: ABNORMAL
EOSINOPHILS RELATIVE PERCENT: 4 % (ref 1–4)
GFR AFRICAN AMERICAN: >60 ML/MIN
GFR NON-AFRICAN AMERICAN: >60 ML/MIN
GFR SERPL CREATININE-BSD FRML MDRD: ABNORMAL ML/MIN/{1.73_M2}
GFR SERPL CREATININE-BSD FRML MDRD: ABNORMAL ML/MIN/{1.73_M2}
GLUCOSE BLD-MCNC: 95 MG/DL (ref 70–99)
HCT VFR BLD CALC: 31.9 % (ref 36.3–47.1)
HEMOGLOBIN: 9.7 G/DL (ref 11.9–15.1)
IMMATURE GRANULOCYTES: 0 %
INR BLD: 1.1
LYMPHOCYTES # BLD: 22 % (ref 24–43)
MCH RBC QN AUTO: 24.9 PG (ref 25.2–33.5)
MCHC RBC AUTO-ENTMCNC: 30.4 G/DL (ref 25.2–33.5)
MCV RBC AUTO: 81.8 FL (ref 82.6–102.9)
MONOCYTES # BLD: 10 % (ref 3–12)
NRBC AUTOMATED: 0 PER 100 WBC
PARTIAL THROMBOPLASTIN TIME: 25.9 SEC (ref 23.9–33.8)
PDW BLD-RTO: 15.2 % (ref 11.8–14.4)
PLATELET # BLD: 332 K/UL (ref 138–453)
PLATELET ESTIMATE: ABNORMAL
PMV BLD AUTO: 9.8 FL (ref 8.1–13.5)
POTASSIUM SERPL-SCNC: 3.6 MMOL/L (ref 3.7–5.3)
PROTHROMBIN TIME: 14.1 SEC (ref 11.5–14.2)
RBC # BLD: 3.9 M/UL (ref 3.95–5.11)
RBC # BLD: ABNORMAL 10*6/UL
SEG NEUTROPHILS: 63 % (ref 36–65)
SEGMENTED NEUTROPHILS ABSOLUTE COUNT: 4.07 K/UL (ref 1.5–8.1)
SODIUM BLD-SCNC: 139 MMOL/L (ref 135–144)
WBC # BLD: 6.4 K/UL (ref 3.5–11.3)
WBC # BLD: ABNORMAL 10*3/UL

## 2021-07-29 PROCEDURE — 2580000003 HC RX 258: Performed by: SURGERY

## 2021-07-29 PROCEDURE — 6360000002 HC RX W HCPCS: Performed by: NURSE ANESTHETIST, CERTIFIED REGISTERED

## 2021-07-29 PROCEDURE — 3700000001 HC ADD 15 MINUTES (ANESTHESIA): Performed by: SURGERY

## 2021-07-29 PROCEDURE — 6370000000 HC RX 637 (ALT 250 FOR IP): Performed by: SURGERY

## 2021-07-29 PROCEDURE — 0DBL8ZX EXCISION OF TRANSVERSE COLON, VIA NATURAL OR ARTIFICIAL OPENING ENDOSCOPIC, DIAGNOSTIC: ICD-10-PCS | Performed by: SURGERY

## 2021-07-29 PROCEDURE — 45380 COLONOSCOPY AND BIOPSY: CPT | Performed by: SURGERY

## 2021-07-29 PROCEDURE — 3700000000 HC ANESTHESIA ATTENDED CARE: Performed by: SURGERY

## 2021-07-29 PROCEDURE — 85730 THROMBOPLASTIN TIME PARTIAL: CPT

## 2021-07-29 PROCEDURE — 71045 X-RAY EXAM CHEST 1 VIEW: CPT

## 2021-07-29 PROCEDURE — 2500000003 HC RX 250 WO HCPCS: Performed by: SURGERY

## 2021-07-29 PROCEDURE — 0DBN8ZX EXCISION OF SIGMOID COLON, VIA NATURAL OR ARTIFICIAL OPENING ENDOSCOPIC, DIAGNOSTIC: ICD-10-PCS | Performed by: SURGERY

## 2021-07-29 PROCEDURE — 80048 BASIC METABOLIC PNL TOTAL CA: CPT

## 2021-07-29 PROCEDURE — 36415 COLL VENOUS BLD VENIPUNCTURE: CPT

## 2021-07-29 PROCEDURE — 0DBM8ZX EXCISION OF DESCENDING COLON, VIA NATURAL OR ARTIFICIAL OPENING ENDOSCOPIC, DIAGNOSTIC: ICD-10-PCS | Performed by: SURGERY

## 2021-07-29 PROCEDURE — 0DBP8ZX EXCISION OF RECTUM, VIA NATURAL OR ARTIFICIAL OPENING ENDOSCOPIC, DIAGNOSTIC: ICD-10-PCS | Performed by: SURGERY

## 2021-07-29 PROCEDURE — 0DBB8ZX EXCISION OF ILEUM, VIA NATURAL OR ARTIFICIAL OPENING ENDOSCOPIC, DIAGNOSTIC: ICD-10-PCS | Performed by: SURGERY

## 2021-07-29 PROCEDURE — 88305 TISSUE EXAM BY PATHOLOGIST: CPT

## 2021-07-29 PROCEDURE — 2060000000 HC ICU INTERMEDIATE R&B

## 2021-07-29 PROCEDURE — 85610 PROTHROMBIN TIME: CPT

## 2021-07-29 PROCEDURE — 7100000010 HC PHASE II RECOVERY - FIRST 15 MIN: Performed by: SURGERY

## 2021-07-29 PROCEDURE — 7100000011 HC PHASE II RECOVERY - ADDTL 15 MIN: Performed by: SURGERY

## 2021-07-29 PROCEDURE — 6370000000 HC RX 637 (ALT 250 FOR IP): Performed by: INTERNAL MEDICINE

## 2021-07-29 PROCEDURE — 3609010300 HC COLONOSCOPY W/BIOPSY SINGLE/MULTIPLE: Performed by: SURGERY

## 2021-07-29 PROCEDURE — 99231 SBSQ HOSP IP/OBS SF/LOW 25: CPT | Performed by: INTERNAL MEDICINE

## 2021-07-29 PROCEDURE — 6360000002 HC RX W HCPCS: Performed by: INTERNAL MEDICINE

## 2021-07-29 PROCEDURE — 85025 COMPLETE CBC W/AUTO DIFF WBC: CPT

## 2021-07-29 PROCEDURE — 2500000003 HC RX 250 WO HCPCS: Performed by: NURSE ANESTHETIST, CERTIFIED REGISTERED

## 2021-07-29 PROCEDURE — 2500000003 HC RX 250 WO HCPCS: Performed by: INTERNAL MEDICINE

## 2021-07-29 PROCEDURE — 2709999900 HC NON-CHARGEABLE SUPPLY: Performed by: SURGERY

## 2021-07-29 RX ORDER — LIDOCAINE HYDROCHLORIDE 20 MG/ML
INJECTION, SOLUTION INFILTRATION; PERINEURAL PRN
Status: DISCONTINUED | OUTPATIENT
Start: 2021-07-29 | End: 2021-07-29 | Stop reason: SDUPTHER

## 2021-07-29 RX ORDER — PROPOFOL 10 MG/ML
INJECTION, EMULSION INTRAVENOUS PRN
Status: DISCONTINUED | OUTPATIENT
Start: 2021-07-29 | End: 2021-07-29 | Stop reason: SDUPTHER

## 2021-07-29 RX ORDER — ESMOLOL HYDROCHLORIDE 10 MG/ML
INJECTION INTRAVENOUS PRN
Status: DISCONTINUED | OUTPATIENT
Start: 2021-07-29 | End: 2021-07-29 | Stop reason: SDUPTHER

## 2021-07-29 RX ORDER — POTASSIUM CHLORIDE 20 MEQ/1
40 TABLET, EXTENDED RELEASE ORAL ONCE
Status: COMPLETED | OUTPATIENT
Start: 2021-07-29 | End: 2021-07-29

## 2021-07-29 RX ADMIN — LIDOCAINE HYDROCHLORIDE 100 MG: 20 INJECTION, SOLUTION INFILTRATION; PERINEURAL at 12:27

## 2021-07-29 RX ADMIN — POTASSIUM CHLORIDE 40 MEQ: 20 TABLET, EXTENDED RELEASE ORAL at 10:14

## 2021-07-29 RX ADMIN — SODIUM CHLORIDE 125 ML/HR: 9 INJECTION, SOLUTION INTRAVENOUS at 16:41

## 2021-07-29 RX ADMIN — KETOROLAC TROMETHAMINE 30 MG: 30 INJECTION, SOLUTION INTRAMUSCULAR; INTRAVENOUS at 08:59

## 2021-07-29 RX ADMIN — METAXALONE 800 MG: 800 TABLET ORAL at 08:45

## 2021-07-29 RX ADMIN — ESMOLOL HYDROCHLORIDE 10 MG: 10 INJECTION, SOLUTION INTRAVENOUS at 12:51

## 2021-07-29 RX ADMIN — SODIUM CHLORIDE: 9 INJECTION, SOLUTION INTRAVENOUS at 20:52

## 2021-07-29 RX ADMIN — AMLODIPINE BESYLATE 10 MG: 5 TABLET ORAL at 08:45

## 2021-07-29 RX ADMIN — METRONIDAZOLE 500 MG: 500 INJECTION, SOLUTION INTRAVENOUS at 05:36

## 2021-07-29 RX ADMIN — ESMOLOL HYDROCHLORIDE 10 MG: 10 INJECTION, SOLUTION INTRAVENOUS at 12:37

## 2021-07-29 RX ADMIN — PROPOFOL 100 MG: 10 INJECTION, EMULSION INTRAVENOUS at 12:27

## 2021-07-29 RX ADMIN — METRONIDAZOLE 500 MG: 500 INJECTION, SOLUTION INTRAVENOUS at 20:52

## 2021-07-29 RX ADMIN — CIPROFLOXACIN 400 MG: 2 INJECTION, SOLUTION INTRAVENOUS at 01:47

## 2021-07-29 RX ADMIN — METAXALONE 800 MG: 800 TABLET ORAL at 20:52

## 2021-07-29 ASSESSMENT — PAIN DESCRIPTION - LOCATION
LOCATION: ABDOMEN

## 2021-07-29 ASSESSMENT — PAIN DESCRIPTION - DESCRIPTORS
DESCRIPTORS: CRAMPING
DESCRIPTORS: DISCOMFORT
DESCRIPTORS: CONSTANT;CRAMPING
DESCRIPTORS: CONSTANT;CRAMPING
DESCRIPTORS: CRAMPING;CONSTANT
DESCRIPTORS: CRAMPING
DESCRIPTORS: CONSTANT;CRAMPING

## 2021-07-29 ASSESSMENT — PAIN DESCRIPTION - PAIN TYPE
TYPE: ACUTE PAIN

## 2021-07-29 ASSESSMENT — PAIN SCALES - GENERAL
PAINLEVEL_OUTOF10: 8
PAINLEVEL_OUTOF10: 3
PAINLEVEL_OUTOF10: 7
PAINLEVEL_OUTOF10: 5
PAINLEVEL_OUTOF10: 4
PAINLEVEL_OUTOF10: 3
PAINLEVEL_OUTOF10: 5

## 2021-07-29 ASSESSMENT — PAIN DESCRIPTION - ORIENTATION
ORIENTATION: LEFT
ORIENTATION: LEFT;OUTER;UPPER
ORIENTATION: LEFT;OUTER;UPPER
ORIENTATION: LEFT;UPPER;LOWER
ORIENTATION: LEFT
ORIENTATION: LEFT
ORIENTATION: LEFT;UPPER;OUTER

## 2021-07-29 ASSESSMENT — PAIN DESCRIPTION - ONSET
ONSET: ON-GOING

## 2021-07-29 ASSESSMENT — PAIN DESCRIPTION - PROGRESSION
CLINICAL_PROGRESSION: NOT CHANGED
CLINICAL_PROGRESSION: GRADUALLY IMPROVING
CLINICAL_PROGRESSION: GRADUALLY IMPROVING

## 2021-07-29 ASSESSMENT — PAIN DESCRIPTION - FREQUENCY
FREQUENCY: INTERMITTENT

## 2021-07-29 ASSESSMENT — PAIN DESCRIPTION - DIRECTION
RADIATING_TOWARDS: PELVIS
RADIATING_TOWARDS: P
RADIATING_TOWARDS: PELVIS

## 2021-07-29 ASSESSMENT — PAIN - FUNCTIONAL ASSESSMENT
PAIN_FUNCTIONAL_ASSESSMENT: ACTIVITIES ARE NOT PREVENTED

## 2021-07-29 NOTE — ANESTHESIA PRE PROCEDURE
Department of Anesthesiology  Preprocedure Note       Name:  Tylor Ramirez   Age:  56 y.o.  :  1960                                          MRN:  [id number]         Date:  2021      Surgeon: Mónica Perkins):  Patricia Michelle MD    Procedure: Procedure(s):  COLONOSCOPY    Medications prior to admission:   Prior to Admission medications    Medication Sig Start Date End Date Taking? Authorizing Provider   lisinopril (PRINIVIL;ZESTRIL) 10 MG tablet Take 1 tablet by mouth daily 21  Yes Sean Ballard DO   ferrous sulfate (FE TABS 325) 325 (65 Fe) MG EC tablet Take 1 tablet by mouth daily (with breakfast) 21  Yes MAX Roasrio CNP   dicyclomine (BENTYL) 10 MG capsule Take 1 capsule by mouth 4 times daily as needed (Cramps/Abdominal Pain) 21  Yes Sean Ballard DO   hyoscyamine (LEVBID) 375 MCG extended release tablet Take 1 tablet by mouth every 12 hours as needed for Cramping 10/9/20  Yes Patricia Michelle MD   MAGNESIUM PO Take by mouth daily  20  Yes Historical Provider, MD   diclofenac sodium 1 % GEL Apply 2 g topically 4 times daily 18  Yes Corin Fill, MAX - CNP   cyclobenzaprine (FLEXERIL) 10 MG tablet Take 1 tablet by mouth 3 times daily as needed for Muscle spasms 18  Yes Corin Fill, MAX - CNP   Multiple Vitamins-Minerals (MULTI-VITAMIN/MINERALS PO) Take 1 tablet by mouth 2 times daily   Yes Historical Provider, MD   vitamin D (ERGOCALCIFEROL) 1.25 MG (55225 UT) CAPS capsule Take 1 capsule by mouth once a week 21   MAX Rosario CNP   Handicap Placard MISC by Does not apply route Issue parking placard for persons with disabilities. [facility] sec 4503.44. Expires in 5 years. Applicant meets the qualifying disability criteria.  20   Ramon Juárez DO   Cholecalciferol (VITAMIN D3) 65734 units CAPS Take 1 capsule by mouth daily 17   Sean Ballard DO   docusate sodium (COLACE) 100 MG capsule Take 1 capsule by mouth 2 times daily 8/1/17   Nakia More, DO       Current medications:    Current Facility-Administered Medications   Medication Dose Route Frequency Provider Last Rate Last Admin    polyethylene glycol-electrolytes (NULYTELY) solution 4,000 mL  4,000 mL Oral Once Tish Dominguez MD        sodium chloride flush 0.9 % injection 10 mL  10 mL Intravenous 2 times per day Myla Otter        sodium chloride flush 0.9 % injection 10 mL  10 mL Intravenous PRN Myla Otter        0.9 % sodium chloride infusion  25 mL Intravenous PRN Myla Otter        acetaminophen (TYLENOL) tablet 650 mg  650 mg Oral Q4H PRN Myla Otter        ondansetron [facility]) injection 4 mg  4 mg Intravenous Q6H PRN Myla Otter        metronidazole (FLAGYL) 500 mg in NaCl 100 mL IVPB premix  500 mg Intravenous Q8H Myla Otter   Stopped at 07/29/21 0615    0.9 % sodium chloride infusion   Intravenous Continuous Myla Otter 125 mL/hr at 07/28/21 2248 New Bag at 07/28/21 2248    metaxalone (SKELAXIN) tablet 800 mg  800 mg Oral TID [employer]   800 mg at 07/29/21 0845    ketorolac (TORADOL) injection 30 mg  30 mg Intravenous Q6H PRN Myla Otter   30 mg at 07/29/21 0859    dicyclomine (BENTYL) capsule 10 mg  10 mg Oral 4x Daily PRN Myla Otter   10 mg at 07/28/21 2042    ciprofloxacin (CIPRO) IVPB 400 mg  400 mg Intravenous Q12H Myla Otter   Stopped at 07/29/21 0247    amLODIPine (NORVASC) tablet 10 mg  10 mg Oral Daily Myla Otter   10 mg at 07/29/21 0845       Allergies:     Allergies   Allergen Reactions    Latex Swelling     The powder in the gloves    Dilaudid [Hydromorphone Hcl] Nausea And Vomiting    Hydromorphone     Medrol [Methylprednisolone] Other (See Comments)     Back pain    Prednisone Nausea And Vomiting       Problem List:    Patient Active Problem List   Diagnosis Code    Hyperopia of both eyes with astigmatism and presbyopia H52.03, H52.203, H52.4    Cortical cataract of left eye H26.9    Lupus ([facility] [address] M32.9    Fibromyalgia M79.7    Anxiety F41.9    Depression F32.9    IBS (irritable bowel syndrome) K58.9    Anemia D64.9    Chest pain R07.9    SBO (small bowel obstruction) ([facility]) K56.609    Right hip pain M25.551    Neck pain M54.2    Cervical radicular pain M54.12    Left upper quadrant pain R10.12    Epigastric pain R10.13    Facet arthritis of cervical region M47.812    Chronic left shoulder pain M25.512, G89.29    Encounter for chronic pain management G89.29    Vitamin D deficiency E55.9    Near syncope R55    Primary insomnia F51.01    Pain in joint M25.50    Open angle with borderline findings and low glaucoma risk in both eyes H40.013    Ocular migraine G43. 109    Leukocytosis D72.829    Keratitis H16.9    Joint stiffness M25.60    Dry eye syndrome of both lacrimal glands H04.123    Colitis K52.9    Benign essential hypertension I10    Adjustment disorder with anxious mood F43.22    Osteoarthritis of hip, unspecified M16.9    Diarrhea R19.7    Lower abdominal pain R10.30    Diverticulitis K57.92       Past Medical History:        Diagnosis Date    Anxiety     Asthma     Cerebral artery occlusion with cerebral infarction [facility]) 2009    Cerebrovascular disease     stroke 1998    Depression     Diverticulitis     Fibromyalgia     History of small bowel obstruction     Hypertension     Lupus ([facility] [address]     Seizures ([facility] [address]  and        Past Surgical History:        Procedure Laterality Date    APPENDECTOMY       SECTION      x 4    COLONOSCOPY      nl    COLONOSCOPY  2015    1 bx    COLONOSCOPY N/A 2020    COLONOSCOPY performed by Michelle Quick MD at [place] [address]  2009    TAHBSO due to fibroids MMR    OTHER SURGICAL HISTORY Left 2016    C6 TFE    AL EGD TRANSORAL BIOPSY SINGLE/MULTIPLE N/A 2017    EGD BIOPSY performed by Michelle Quick MD at [address] [place] [address] 3/1/16    16 removed on bottom 1 on top     TUBAL LIGATION         Social History:    Social History     Tobacco Use    Smoking status: Former Smoker     Packs/day: 0.25     Years: 10.00     Pack years: 2.50     Quit date: 1993     Years since quittin.2    Smokeless tobacco: Never Used    Tobacco comment: alecia [facility] 3/12/17   Substance Use Topics    Alcohol use: Yes     Alcohol/week: 0.0 standard drinks     Comment: has about 1 or 2 a year. very rarely                                Counseling given: Not Answered  Comment: alecia [facility] 3/12/17      Vital Signs (Current):   Vitals:    21 1518 21 1838 21 2322 21 0310   BP: 129/68 (!) 157/82 (!) 169/92 (!) 151/79   Pulse: 88 93 93 85   Resp: 14 18 18 14   Temp: 36.7 °C (98 °F) 36.7 °C (98 °F) 36.5 °C (97.7 °F) 36.6 °C (97.8 °F)   TempSrc: Oral Oral Tympanic Tympanic   SpO2: 98% 99%  96%   Weight:       Height:                                                  BP Readings from Last 3 Encounters:   21 (!) 151/79   21 130/80   21 (!) 172/92       NPO Status:                                                                                 BMI:   Wt Readings from Last 3 Encounters:   21 184 lb 3.2 oz (83.6 kg)   21 177 lb (80.3 kg)   21 170 lb (77.1 kg)     Body mass index is 35.97 kg/m².     CBC:   Lab Results   Component Value Date    WBC 6.4 2021    RBC 3.90 2021    HGB 9.7 2021    HCT 31.9 2021    MCV 81.8 2021    RDW 15.2 2021     2021       CMP:   Lab Results   Component Value Date     2021    K 3.6 2021     2021    CO2 25 2021    BUN 4 2021    CREATININE 0.66 2021    GFRAA >60 2021    LABGLOM >60 2021    GLUCOSE 95 2021    PROT 8.5 2021    CALCIUM 8.8 2021    BILITOT 0.25 2021    ALKPHOS 85 2021    AST 21 2021    ALT 12 2021       POC Tests: No results for input(s): POCGLU, POCNA, POCK, POCCL, POCBUN, POCHEMO, POCHCT in the last 72 hours. Coags:   Lab Results   Component Value Date    PROTIME 14.1 07/29/2021    INR 1.1 07/29/2021    APTT 25.9 07/29/2021       HCG (If Applicable): No results found for: PREGTESTUR, PREGSERUM, HCG, HCGQUANT     ABGs: No results found for: PHART, PO2ART, XUZ6CDC, FLZ1UFE, BEART, L1OIVTFV     Type & Screen (If Applicable):  No results found for: LABABO, LABRH    Drug/Infectious Status (If Applicable):  Lab Results   Component Value Date    HEPCAB NONREACTIVE 04/14/2016       COVID-19 Screening (If Applicable):   Lab Results   Component Value Date    COVID19 Not Detected 07/27/2021    COVID19 Not Detected 09/24/2020           Anesthesia Evaluation  Patient summary reviewed and Nursing notes reviewed no history of anesthetic complications:   Airway: Mallampati: III  TM distance: >3 FB   Neck ROM: full  Mouth opening: > = 3 FB Dental: normal exam         Pulmonary:   (+) decreased breath sounds,  asthma: exercise-induced asthma,           Patient did not smoke on day of surgery. Cardiovascular:    (+) hypertension: moderate,         Rhythm: regular  Rate: normal           Beta Blocker:  Dose within 24 Hrs         Neuro/Psych:   (+) CVA: residual symptoms, neuromuscular disease:, headaches: migraine headaches, psychiatric history: stable with treatmentdepression/anxiety             GI/Hepatic/Renal:   (+) bowel prep, morbid obesity          Endo/Other:    (+) : arthritis: OA., . Pt had no PAT visit       Abdominal:   (+) obese,     Abdomen: soft. Vascular: negative vascular ROS. Other Findings:             Anesthesia Plan      MAC     ASA 3       Induction: intravenous. MIPS: Postoperative opioids intended and Prophylactic antiemetics administered. Anesthetic plan and risks discussed with patient. Plan discussed with attending, surgical team and CRNA.                   Bunny Cordero Joseph Kennedy, APRN - CRNA   7/29/2021

## 2021-07-29 NOTE — FLOWSHEET NOTE
rounding on PCU    Assessment: Patinet will be taken for procedure in a few minutes. Intervention: Engaged in conversation.  prayed with patient. Patient expressed appreciation for visit and offer of continued prayer.     Plan: Chaplains are available on site or on call 24/7 for spiritual and emotional support.     07/29/21 1115   Encounter Summary   Services provided to: Patient   Referral/Consult From: [facility] Family members   Continue Visiting   (7/29/21)   Complexity of Encounter Moderate   Length of Encounter 15 minutes   Routine   Type Pre-procedure   Spiritual/Shinto   Type Spiritual support   Assessment Approachable   Intervention Prayer   Outcome Expressed gratitude;Engaged in conversation

## 2021-07-29 NOTE — OP NOTE
Operative Note      Patient: Alexandria Dewey  YOB: 1960  MRN: [id number]    Date of Procedure: 7/29/2021    Pre-Op Diagnosis: Rectal Bleeding, Abdominal Pain, Abnormal CT, Diarrhea    Post-Op Diagnosis: Same and Sigmoid Diverticulosis, Possible colitis (subtle) of descending), hemorrhoids       Procedure(s):  COLONOSCOPY + biopsies    Surgeon(s):  Michelle Burgess MD    Assistant:   * No surgical staff found *    Anesthesia: General    Estimated Blood Loss (mL): Minimal    Complications: None    Specimens:   ID Type Source Tests Collected by Time Destination   A : ILEUM BX Tissue Colon SURGICAL PATHOLOGY Michelle Burgess MD 7/29/2021 1254        Implants:  * No implants in log *      Drains: * No LDAs found *    Findings: Patient was brought to the endoscopy area and IV sedation was induced by the CRNA. It was reported that she did not drink all of her prep and it turned out she had quite a bit of retained heavily particulate stool throughout her colon. However the majority of mucosa could be seen fairly well although the certainly made it more difficult. The stool that she had retained in her colon was not at all bloody. She told me prior to the procedures that her initial stools when she was doing the prep were bloody but that cleared up by the time she was done. Scope was finished rectum and passed inwards. She has pretty extensive sigmoid diverticulosis. No evidence of any active bleeding or obvious inflammation was seen. In the descending colon there might be some subtle evidence of inflammation but again it is not dramatic certainly no active bleeding was identified. Scope was ultimately passed all around the cecum and I was able to cannulate the terminal ileum for a few inches. The enteric fluid is also fairly particulate no evidence of any blood in it. I went ahead and did some random biopsies throughout her colon and that she has had a question of colitis in the past as well. Previous biopsies were negative. Biopsies of the ileum the right side of the colon were done. Also did biopsies of the transverse colon. That I did several biopsies fairly close together around the splenic flexure and descending colon where it looks like she might have some inflammation. Proximal sigmoid distal sigmoid and rectum were also biopsied. Retroflexion scope in the stomach she does have some fairly significant internal hemorrhoids. Again no active bleeding at this time.   Electronically signed by Maria Esther Todd MD on 7/29/2021 at 12:55 PM

## 2021-07-29 NOTE — PROGRESS NOTES
85   Temp 97.8 °F (36.6 °C) (Tympanic)   Resp 14   Ht 5' (1.524 m)   Wt 184 lb 3.2 oz (83.6 kg)   SpO2 96%   BMI 35.97 kg/m²   24 hour intake/output:    Intake/Output Summary (Last 24 hours) at 7/29/2021 0921  Last data filed at 7/29/2021 0539  Gross per 24 hour   Intake 3974 ml   Output --   Net 3974 ml     Last 3 weights: Wt Readings from Last 3 Encounters:   07/28/21 184 lb 3.2 oz (83.6 kg)   07/08/21 177 lb (80.3 kg)   07/04/21 170 lb (77.1 kg)     HEENT: Normocephalic and Atraumatic  Neck: Supple, No Masses, Tenderness, Nodularity and No Lymphadenopathy  Chest/Lungs: Clear to Auscultation without Rales, Rhonchi, or Wheezes  Cardiac: Regular Rate and Rhythm without Rubs, Clicks, Gallops, or Murmurs  GI/Abdomen: Bowel Sounds Present  without Guarding or Rebound Tenderness  : Not examined  EXT/Skin: No Edema, No Cyanosis and No Clubbing  Neuro: Alert and Oriented and No Localizing Signs/Symptoms      Assessment:    Active Problems:    Colitis    Diverticulitis  Resolved Problems:    * No resolved hospital problems.  *  SHARON   ES       56 AAF  [merlin Ballard, FP;  [facility]---[facility]; Dolores Baeza, SHANKAR]                           FULL CODE     LOVENOX    COVID-19---negative----no vaccine    Anti-infectives:   Cipro IV, Flagyl IV    POD  _____  colonoscopy--biopsies----7.29.2021---Longo--no bleeding--no                         obvious diverticulitis--no bleeding--no obvious ischemia---                        hemorrhoids--possible subtle colitis  Diverticulitis---acute----sigmoid---with rectal bleeding----7.27.2021----leukocytosis---nausea---                             vomiting----consider ischemic colitis       C difficle----[-]---7.27.2021       CT abdomen-pelvis---7.27.2021---colonic wall thickening involving                            the splenic flexure and descending colon consistent with                             an infectious or inflammatory colitis----associated reactive mesenteric adenopathy       CXR---2021--NACs       Diverticulitis----history     Hypertension         EKG---2021--NSR---81---NACs         EKG----10.12.2020---NSR---88---NACs         Chest pain---2018         MI ruled out---2018         CT chest pulmonary--2018--no PE          EKG--2018---sinus tachycardia--108--NSSTTCs          2D ECHO---2018--NLVSF--NRVSF--mildly thickened                      MV leaflets--trivial MR---SANTIAGO but opens well--mild TR---                      RVSP ~ 16 mm Hg--Grade 1  mild DD--LVEF > 55%          EKG--2018--NSR--84--QTc 493          MI ruled out---.     Asthma   Anxiety  Cerebrovascular disease          CVA---          CVA----right-sided weakness          CT brain---. ---no acute changes  Lupus-----SLE   Chronic pain syndrome            Cervical radiculopathy           Left shoulder pain                Fibromyalgia  Depression---anxiety---adjustment disorder   Tobacco abuse----quit--.  HEARING IMPAIRMENT  PMH:   Irritable bowel syndrome, anemia, hyperopia--              astigmatism--presbyopia,  open angle glaucoma, H. pylori--              --treated, anemia------iron deficiency, dry eye syndrome,              SBO--abdominal pain-- twice, seizure--2858-6970, near syncope---              --dizziness, ocular migraines, SBO, Vitamin D deficiency, headaches,              hypokalemia   PSH:   appendectomy,  x 4, EDU-BSO--fibroids--,  BTL--              tubal ligation, EGD--PUD, colonoscopy--normal----              biopsy, D&C--uterus, colonoscopy------normal, EGD--biopsy--2017,               dental extractions      Allergies:         LATEX--swelling,    Intolerances:   Medrol--methylprednisolone--back pain,  prednisone--                          nausea-vomiting, Dilaudid--hydromorphone---nausea-vomiting      Plan:  1. Sp colonoscopy--diet being advanced by General Surgery  2. Cipro--Flagyl  3.   See orders     Electronically signed by Pascual Robledo on 7/29/2021 at 9:21 AM    Hospitalist

## 2021-07-30 ENCOUNTER — TELEPHONE (OUTPATIENT)
Dept: FAMILY MEDICINE CLINIC | Age: 61
End: 2021-07-30

## 2021-07-30 VITALS
BODY MASS INDEX: 35.56 KG/M2 | DIASTOLIC BLOOD PRESSURE: 73 MMHG | TEMPERATURE: 97.8 F | SYSTOLIC BLOOD PRESSURE: 126 MMHG | HEIGHT: 60 IN | OXYGEN SATURATION: 96 % | WEIGHT: 181.1 LBS | HEART RATE: 94 BPM | RESPIRATION RATE: 20 BRPM

## 2021-07-30 LAB
ABSOLUTE EOS #: 0.26 K/UL (ref 0–0.44)
ABSOLUTE IMMATURE GRANULOCYTE: <0.03 K/UL (ref 0–0.3)
ABSOLUTE LYMPH #: 1.65 K/UL (ref 1.1–3.7)
ABSOLUTE MONO #: 0.71 K/UL (ref 0.1–1.2)
ANION GAP SERPL CALCULATED.3IONS-SCNC: 8 MMOL/L (ref 9–17)
BASOPHILS # BLD: 0 % (ref 0–2)
BASOPHILS ABSOLUTE: <0.03 K/UL (ref 0–0.2)
BUN BLDV-MCNC: 6 MG/DL (ref 8–23)
BUN/CREAT BLD: 8 (ref 9–20)
CALCIUM SERPL-MCNC: 8.5 MG/DL (ref 8.6–10.4)
CHLORIDE BLD-SCNC: 106 MMOL/L (ref 98–107)
CO2: 24 MMOL/L (ref 20–31)
CREAT SERPL-MCNC: 0.72 MG/DL (ref 0.5–0.9)
DIFFERENTIAL TYPE: ABNORMAL
EOSINOPHILS RELATIVE PERCENT: 4 % (ref 1–4)
GFR AFRICAN AMERICAN: >60 ML/MIN
GFR NON-AFRICAN AMERICAN: >60 ML/MIN
GFR SERPL CREATININE-BSD FRML MDRD: ABNORMAL ML/MIN/{1.73_M2}
GFR SERPL CREATININE-BSD FRML MDRD: ABNORMAL ML/MIN/{1.73_M2}
GLUCOSE BLD-MCNC: 89 MG/DL (ref 70–99)
HCT VFR BLD CALC: 30.2 % (ref 36.3–47.1)
HEMOGLOBIN: 9.3 G/DL (ref 11.9–15.1)
IMMATURE GRANULOCYTES: 0 %
LYMPHOCYTES # BLD: 25 % (ref 24–43)
MCH RBC QN AUTO: 24.9 PG (ref 25.2–33.5)
MCHC RBC AUTO-ENTMCNC: 30.8 G/DL (ref 25.2–33.5)
MCV RBC AUTO: 80.7 FL (ref 82.6–102.9)
MONOCYTES # BLD: 11 % (ref 3–12)
NRBC AUTOMATED: 0 PER 100 WBC
PDW BLD-RTO: 15.1 % (ref 11.8–14.4)
PLATELET # BLD: 334 K/UL (ref 138–453)
PLATELET ESTIMATE: ABNORMAL
PMV BLD AUTO: 9.7 FL (ref 8.1–13.5)
POTASSIUM SERPL-SCNC: 3.8 MMOL/L (ref 3.7–5.3)
RBC # BLD: 3.74 M/UL (ref 3.95–5.11)
RBC # BLD: ABNORMAL 10*6/UL
SEG NEUTROPHILS: 60 % (ref 36–65)
SEGMENTED NEUTROPHILS ABSOLUTE COUNT: 3.96 K/UL (ref 1.5–8.1)
SODIUM BLD-SCNC: 138 MMOL/L (ref 135–144)
WBC # BLD: 6.6 K/UL (ref 3.5–11.3)
WBC # BLD: ABNORMAL 10*3/UL

## 2021-07-30 PROCEDURE — 6370000000 HC RX 637 (ALT 250 FOR IP): Performed by: SURGERY

## 2021-07-30 PROCEDURE — 6360000002 HC RX W HCPCS: Performed by: SURGERY

## 2021-07-30 PROCEDURE — 85025 COMPLETE CBC W/AUTO DIFF WBC: CPT

## 2021-07-30 PROCEDURE — 2500000003 HC RX 250 WO HCPCS: Performed by: SURGERY

## 2021-07-30 PROCEDURE — 99238 HOSP IP/OBS DSCHRG MGMT 30/<: CPT | Performed by: INTERNAL MEDICINE

## 2021-07-30 PROCEDURE — 36415 COLL VENOUS BLD VENIPUNCTURE: CPT

## 2021-07-30 PROCEDURE — 80048 BASIC METABOLIC PNL TOTAL CA: CPT

## 2021-07-30 RX ORDER — GREEN TEA/HOODIA GORDONII 315-12.5MG
1 CAPSULE ORAL 3 TIMES DAILY
Qty: 30 TABLET | Refills: 0 | COMMUNITY
Start: 2021-07-30 | End: 2021-08-09

## 2021-07-30 RX ORDER — METRONIDAZOLE 500 MG/1
500 TABLET ORAL 3 TIMES DAILY
Qty: 15 TABLET | Refills: 0 | Status: SHIPPED | OUTPATIENT
Start: 2021-07-30 | End: 2021-08-04

## 2021-07-30 RX ORDER — AMLODIPINE BESYLATE 10 MG/1
10 TABLET ORAL DAILY
Qty: 30 TABLET | Refills: 0 | Status: SHIPPED | OUTPATIENT
Start: 2021-07-31 | End: 2022-02-23 | Stop reason: SDUPTHER

## 2021-07-30 RX ORDER — CIPROFLOXACIN 500 MG/1
500 TABLET, FILM COATED ORAL 2 TIMES DAILY
Qty: 10 TABLET | Refills: 0 | Status: SHIPPED | OUTPATIENT
Start: 2021-07-30 | End: 2021-08-04

## 2021-07-30 RX ADMIN — METRONIDAZOLE 500 MG: 500 INJECTION, SOLUTION INTRAVENOUS at 06:08

## 2021-07-30 RX ADMIN — CIPROFLOXACIN 400 MG: 2 INJECTION, SOLUTION INTRAVENOUS at 02:13

## 2021-07-30 RX ADMIN — METAXALONE 800 MG: 800 TABLET ORAL at 08:16

## 2021-07-30 RX ADMIN — DICYCLOMINE HYDROCHLORIDE 10 MG: 10 CAPSULE ORAL at 08:16

## 2021-07-30 RX ADMIN — AMLODIPINE BESYLATE 10 MG: 5 TABLET ORAL at 08:16

## 2021-07-30 RX ADMIN — ACETAMINOPHEN 650 MG: 325 TABLET ORAL at 08:16

## 2021-07-30 ASSESSMENT — PAIN SCALES - GENERAL
PAINLEVEL_OUTOF10: 0
PAINLEVEL_OUTOF10: 7
PAINLEVEL_OUTOF10: 2

## 2021-07-30 NOTE — FLOWSHEET NOTE
1220  Reviewed discharge plan of care, medication, and discharge instructions. Condition stable. Pain 2/10 to lower abdomen. Ate lunch, tolerated well. See discharge instructions. Pain discharge to self, per car home.

## 2021-07-30 NOTE — PROGRESS NOTES
Hospitalist Progress Note    Patient:  Zack Ramirez     YOB: 1960    MRN: [id number]   Admit date: 7/27/2021     Acct: [de-identified]     PCP: Angi Mckeon, DO    CC--Interval History: Colitis--initial impression diverticulitis----markedly improved--pain controlled---colonoscopy---7.29.2021--see below pathoogy pending----home----7.30.2021 and follow up Dr. Chyna Damian, [address] [place] [address] outpatient for biopsy results    HTN--BF, therefore, dc'd ACEI and started calcium channel blocker--Norvasv    Asthma---quiescent    Depression---anxiety---controlled    See note below     All other ROS negative except noted in HPI    Diet:  No diet orders on file    Medications:  Scheduled Meds:  Continuous Infusions:  PRN Meds:    Objective:  Labs:  CBC with Differential:    Lab Results   Component Value Date    WBC 6.6 07/30/2021    RBC 3.74 07/30/2021    HGB 9.3 07/30/2021    HCT 30.2 07/30/2021     07/30/2021    MCV 80.7 07/30/2021    MCH 24.9 07/30/2021    MCHC 30.8 07/30/2021    RDW 15.1 07/30/2021    LYMPHOPCT 25 07/30/2021    MONOPCT 11 07/30/2021    BASOPCT 0 07/30/2021    MONOSABS 0.71 07/30/2021    LYMPHSABS 1.65 07/30/2021    EOSABS 0.26 07/30/2021    BASOSABS <0.03 07/30/2021    DIFFTYPE NOT REPORTED 07/30/2021     BMP:    Lab Results   Component Value Date     07/30/2021    K 3.8 07/30/2021     07/30/2021    CO2 24 07/30/2021    BUN 6 07/30/2021    LABALBU 4.5 07/27/2021    CREATININE 0.72 07/30/2021    CALCIUM 8.5 07/30/2021    GFRAA >60 07/30/2021    LABGLOM >60 07/30/2021    GLUCOSE 89 07/30/2021           Physical Exam:  Vitals: /73   Pulse 94   Temp 97.8 °F (36.6 °C) (Tympanic)   Resp 20   Ht 5' (1.524 m)   Wt 181 lb 1.6 oz (82.1 kg)   SpO2 96%   BMI 35.37 kg/m²   24 hour intake/output:    Intake/Output Summary (Last 24 hours) at 7/30/2021 1430  Last data filed at 7/30/2021 0521  Gross per 24 hour   Intake 1999 ml   Output --   Net 1999 ml     Last 3 weights:   Wt Readings from Last 3 Encounters:   07/30/21 181 lb 1.6 oz (82.1 kg)   07/08/21 177 lb (80.3 kg)   07/04/21 170 lb (77.1 kg)     HEENT: Normocephalic and Atraumatic  Neck: Supple, No Masses, Tenderness, Nodularity and No Lymphadenopathy  Chest/Lungs: Clear to Auscultation without Rales, Rhonchi, or Wheezes  Cardiac: Regular Rate and Rhythm  GI/Abdomen: Bowel Sounds Present and Soft,  without Guarding or Rebound Tenderness  : Not examined  EXT/Skin: No Cyanosis, No Clubbing and Edema Present ---trivialNeuro: Alert and Oriented and No Localizing Signs/Symptoms      Assessment:    Active Problems:    Colitis    Diverticulitis  Resolved Problems:    * No resolved hospital problems.  *    SHARON SUGGS       56 AAF  [merlin Ballard, FP;  [facility]---[facility]; Shanna Wilson, SHANKAR]                           FULL CODE     LOVENOX    COVID-19---negative----no vaccine    Anti-infectives:   Cipro IV, Flagyl IV    POD  _____  colonoscopy--biopsies----7.29.2021---Longo--no                         obvious diverticulitis--no bleeding--no obvious ischemia---                        hemorrhoids--possible subtle colitis  Diverticulitis---acute----sigmoid---with rectal bleeding----7.27.2021----leukocytosis---nausea---                             vomiting----consider ischemic colitis       C difficle----[-]---7.27.2021       CT abdomen-pelvis---7.27.2021---colonic wall thickening involving                            the splenic flexure and descending colon consistent with                             an infectious or inflammatory colitis----associated reactive                            mesenteric adenopathy       CXR---7.29.2021--NACs       Diverticulitis----history     Hypertension         EKG---7.28.2021--NSR---81---NACs         EKG----10.12.2020---NSR---88---NACs         Chest pain---11.19.2018         MI ruled out---11.20.2018         CT chest pulmonary--11.19.2018--no PE          EKG--11.19.2018---sinus tachycardia--108--NSSTTCs          2D ECHO---2018--NLVSF--NRVSF--mildly thickened                      MV leaflets--trivial MR---SANTIAGO but opens well--mild TR---                      RVSP ~ 16 mm Hg--Grade 1  mild DD--LVEF > 55%          EKG--2018--NSR--84--QTc 493          MI ruled out---.     Asthma   Anxiety  Cerebrovascular disease          CVA---          CVA----right-sided weakness          CT brain---. ---no acute changes  Lupus-----SLE   Chronic pain syndrome            Cervical radiculopathy           Left shoulder pain                Fibromyalgia  Depression---anxiety---adjustment disorder   Tobacco abuse----quit--1993  HEARING IMPAIRMENT  PMH:   Irritable bowel syndrome, anemia, hyperopia--              astigmatism--presbyopia,  open angle glaucoma, H. pylori--              --treated, anemia------iron deficiency, dry eye syndrome,              SBO--abdominal pain-- twice, seizure--1514-2169, near syncope---              --dizziness, ocular migraines, SBO, Vitamin D deficiency, headaches,              hypokalemia   PSH:   appendectomy,  x 4, EDU-BSO--fibroids--,  BTL--              tubal ligation, EGD--PUD, colonoscopy--normal----              biopsy, D&C--uterus, colonoscopy------normal, EGD--biopsy--,               dental extractions      Allergies:         LATEX--swelling,    Intolerances:   Medrol--methylprednisolone--back pain,  prednisone--                          nausea-vomiting, Dilaudid--hydromorphone---nausea-vomiting          Plan:  1. Home---2021  2. Medications reviewed  3. HTN---changed to Norvasc----dc ACEI due to BF  4. Colitis---IV --> PO Cipro--Flagyl  5. Probiotics   6. Await pathology  7. Follow up Dr. Manjit Lovelace, [address] [facility]  8.   Follow up Dr. Leila Rosales    Electronically signed by Talha Kulkarni on 2021 at 2:30 PM    Hospitalist

## 2021-07-30 NOTE — DISCHARGE INSTR - DIET

## 2021-07-31 NOTE — DISCHARGE SUMMARY
Brandy [address]                 [address] [place] [address], [address] [facility]                               DISCHARGE SUMMARY    PATIENT NAME: Jenny Morris [address] [place] [address]                    :        1960  MED REC NO:   [id number]                             ROOM:       0217  ACCOUNT NO:   [de-identified]                           ADMIT DATE: 2021  PROVIDER:     Alina Loyd. Marcela Hale MD                  DISCHARGE DATE:  2021    ATTENDING PHYSICIAN OF HOSPITALIZATION:  Chuck Fields MD    PERSONAL PHYSICIAN:  Tenzin Pelayo DO, [facility], [facility]. The patient has been seen by [facility], [facility], and seen by Dr. Kermit Vincent, this  hospitalization for colonoscopy with biopsies on 2021. DIAGNOSES:  1. Initial working diagnosis was acute diverticulitis of the sigmoid  with rectal bleeding, 2021, leukocytosis, nausea, vomiting,  possible ischemic colitis. At this point, possible subtle colitis is  now the working diagnosis. Status post colonoscopy, biopsy, 2021,  by Dr. Adrien Avitia, no bleeding, no obvious diverticulosis, no obvious  ischemia, hemorrhoids are present, possible subtle colitis. C.  difficile, 2021, negative. CT scan of the abdomen and pelvis,  2021, colonic wall thickening involving the splenic flexure and  descending colon consistent with infectious or inflammatory colitis  associated reactive mesentery adenopathy. Chest x-ray, 2021, no  acute changes. Diverticulitis, by history. 2.  Hypertension. EKG, 2021, normal sinus rhythm, rate 81, no  acute changes. A 2D echo of 2018, normal left ventricular  systolic function, normal right ventricular systolic function, mildly  thickened MV leaflets, trivial MR, SANTIAGO but opens well, mild TR, RVSP 16  mmHg, grade 1 mild diastolic dysfunction, LVEF greater than 55%. 3.  Asthma, quiescent. 4.  Anxiety.   5. Cerebrovascular disease, CVA in 2009, and CVA 1998 with right-sided  weakness. 6.  Lupus (SLE). 7.  Chronic pain syndrome. 8.  Depression, anxiety, adjustment disorder. 9.  Tobacco abuse, quit in 1993.  10.  Hearing impairment. Other medical problems set forth in the progress note of 07/30/2021,  incorporated for reference herein. HISTORY OF PRESENT ILLNESS AND HOSPITAL COURSE:  The patient is a  68-year-old black female, patient of Leon Shah DO, [facility], [facility], and [facility]. The  patient presented with complaints of pain in the abdomen left hand side. The  patient was deemed initially to have an acute diverticulitis and was  treated in that fashion with Cipro and Flagyl. The patient underwent  the colonoscopy and biopsies on 07/29/2021, as set forth above. Possible subtle colitis being present. The patient's pain diminished  and was able to be discharged to home on 07/30/2021. Hypertension, see above. Blood pressure was 126/73 around the time of  discharge. The patient has underlying asthma, quiescent. Prior smoker,  quit in 1993. Cerebrovascular disease, which she has had CVAs twice with mild  right-sided weakness. The patient represents SLE (lupus), no  significant factor during this hospitalization determined. Chronic pain syndrome. Depression, anxiety, adjustment disorder, fairly well controlled. LABORATORY DATA:  Around the time of discharge, white cell count 6.6,  hemoglobin 9.3, hematocrit 30.2, platelets 692,135. Sodium 138,  potassium 3.8, chloride 106, CO2 24, BUN 6, creatinine 0.72, glucose 89,  calcium 8.5, and GFR greater than 60. DISCHARGE INSTRUCTIONS/FOLLOWUP:  Discharged to home on 07/30/2021. DIET:  Cardiac. ACTIVITY:  As tolerated. CONDITION AT DISCHARGE:  Fair, improved.     MEDICATIONS:  NEW:  Given that she has hypertension, black female, the patient was  taken off her prior blood pressure medications, which had included an  ACE inhibitor. The patient is now on amlodipine (Norvasc) 10 mg p.o.  daily; for the patient's colitis, Cipro 500 mg p.o. b.i.d., 5 days;  metronidazole (Flagyl) 500 mg three times a day, 5 days; probiotic  acidophilus one p.o. three times a day. FOLLOWING MEDICATIONS CONTINUED WITHOUT CHANGE:  Flexeril  (cyclobenzaprine) 10 mg p.o. three times a day p.r.n. muscle spasm;  diclofenac sodium (Voltaren) 1% gel 2 gm topically four times daily;  dicyclomine (Bentyl) 10 mg p.o. four times a day p.r.n. muscle cramps,  abdominal discomfort; docusate sodium (Colace) 100 mg twice daily;  ferrous sulfate 325 mg p.o. daily with breakfast; hyoscyamine (Levbid)  375 mcg extended release one p.o. q.12 hours p.r.n. muscle cramping;  magnesium OTC one p.o. daily; multiple vitamin minerals one twice daily;  vitamin D 1.25 mg (50,000 units) p.o. weekly; vitamin D3 250 mcg (10,000  units) p.o. daily. SPECIFICALLY DISCONTINUED:  Lisinopril 10 mg p.o. daily. Follow up with the patient's personal physician, Alena Padron DO, [facility]. Follow up with Kylee Negro,  General Surgery, outpatient to discuss the results of biopsies and her  colonoscopy. Any aspect of the patient's care not discussed in the chart and/or  dictation will be addressed and treated as an outpatient. The patient's medications have been reviewed including, but not limited  to, pre-hospital, hospital and discharge medications. The patient  and/or the patient's personal representatives were specifically advised  the only medications to be taken are those set forth in the discharge  orders and no other medications should be taken. Any prior medications  not on the discharge orders are specifically discontinued.         Darnell Bowden MD    D: 07/30/2021 17:42:16       T: 07/30/2021 17:52:22     [email or web address]  Job#: [id number]     Doc#: [id number]    CC:

## 2021-08-02 LAB — SURGICAL PATHOLOGY REPORT: NORMAL

## 2021-08-04 ENCOUNTER — OFFICE VISIT (OUTPATIENT)
Dept: SURGERY | Age: 61
End: 2021-08-04
Payer: MEDICARE

## 2021-08-04 ENCOUNTER — TELEPHONE (OUTPATIENT)
Dept: SURGERY | Age: 61
End: 2021-08-04

## 2021-08-04 VITALS
TEMPERATURE: 98.5 F | SYSTOLIC BLOOD PRESSURE: 128 MMHG | BODY MASS INDEX: 33.57 KG/M2 | HEIGHT: 60 IN | DIASTOLIC BLOOD PRESSURE: 82 MMHG | WEIGHT: 171 LBS | HEART RATE: 117 BPM | OXYGEN SATURATION: 97 %

## 2021-08-04 DIAGNOSIS — R10.10 PAIN OF UPPER ABDOMEN: ICD-10-CM

## 2021-08-04 DIAGNOSIS — R10.30 LOWER ABDOMINAL PAIN: ICD-10-CM

## 2021-08-04 DIAGNOSIS — R19.7 DIARRHEA, UNSPECIFIED TYPE: Primary | ICD-10-CM

## 2021-08-04 DIAGNOSIS — K21.9 GASTROESOPHAGEAL REFLUX DISEASE, UNSPECIFIED WHETHER ESOPHAGITIS PRESENT: ICD-10-CM

## 2021-08-04 PROCEDURE — 99215 OFFICE O/P EST HI 40 MIN: CPT

## 2021-08-04 PROCEDURE — 3017F COLORECTAL CA SCREEN DOC REV: CPT | Performed by: SURGERY

## 2021-08-04 PROCEDURE — 1036F TOBACCO NON-USER: CPT | Performed by: SURGERY

## 2021-08-04 PROCEDURE — 99215 OFFICE O/P EST HI 40 MIN: CPT | Performed by: SURGERY

## 2021-08-04 PROCEDURE — 99214 OFFICE O/P EST MOD 30 MIN: CPT | Performed by: SURGERY

## 2021-08-04 PROCEDURE — G8427 DOCREV CUR MEDS BY ELIG CLIN: HCPCS | Performed by: SURGERY

## 2021-08-04 PROCEDURE — 1111F DSCHRG MED/CURRENT MED MERGE: CPT | Performed by: SURGERY

## 2021-08-04 PROCEDURE — G8417 CALC BMI ABV UP PARAM F/U: HCPCS | Performed by: SURGERY

## 2021-08-04 RX ORDER — OMEPRAZOLE 20 MG/1
20 CAPSULE, DELAYED RELEASE ORAL
Qty: 30 CAPSULE | Refills: 0 | Status: SHIPPED | OUTPATIENT
Start: 2021-08-04

## 2021-08-04 NOTE — PROGRESS NOTES
Name:  Trevin Ramirez  Age:  56 y.o.   :  1960    Physician: Dante Martinez MD       Chief Complaint: Abdominal pain      HPI: Patient was recently hospitalized with diarrhea, rectal bleeding and abdominal pain. Rectal bleeding has resolved but she continues to have some diarrhea. Also having ongoing abdominal pain. She does have a history of abdominal pain been evaluated several times in the past.  The pain she is complaining of currently is mainly left upper quadrant. She also has a lot of post prandial epigastric and substernal pain and wakes up at night with substernal pain as well. She used to be on omeprazole which she did find helpful but is not on it anymore. In fact now she uses mustard to treat her heartburn when she wakes up with it in the middle of the night. Her last EGD was in  and it did show some gastritis.         MEDICAL HISTORY:    Past Medical History:        Diagnosis Date    Anxiety     Asthma     Cerebral artery occlusion with cerebral infarction [facility])     Cerebrovascular disease     stroke     Depression     Diverticulitis     Fibromyalgia     History of small bowel obstruction     Hypertension     Lupus ([facility] [address]     Seizures ([facility] [address]  and        Past Surgical History:        Procedure Laterality Date    APPENDECTOMY       SECTION      x 4    COLONOSCOPY  2007    nl    COLONOSCOPY  2015    1 bx    COLONOSCOPY N/A 2020    COLONOSCOPY performed by Snehal New MD at [address] [place] [address] N/A 2021    COLONOSCOPY WITH BIOPSY performed by Snehal New MD at [place] [address]  2009    TAHBSO due to fibroids MMR    OTHER SURGICAL HISTORY Left 2016    C6 TFE    MT EGD TRANSORAL BIOPSY SINGLE/MULTIPLE N/A 2017    EGD BIOPSY performed by Snehal New MD at [address] [place] [address]  3/1/16    16 removed on bottom 1 on top     TUBAL LIGATION         Prior to Admission medications    Medication Sig Start Date End Date Taking? Authorizing Provider   omeprazole (PRILOSEC) 20 MG delayed release capsule Take 1 capsule by mouth every morning (before breakfast) 8/4/21  Yes Greg Stephens MD   amLODIPine (NORVASC) 10 MG tablet Take 1 tablet by mouth daily 7/31/21  Yes Jairon Coley   Probiotic Acidophilus [facility]) TABS Take 1 tablet by mouth 3 times daily for 10 days 7/30/21 8/9/21 Yes Jairon Coley   ciprofloxacin (CIPRO) 500 MG tablet Take 1 tablet by mouth 2 times daily for 5 days 7/30/21 8/4/21 Yes Jairon Coley   metroNIDAZOLE (FLAGYL) 500 MG tablet Take 1 tablet by mouth 3 times daily for 5 days 7/30/21 8/4/21 Yes Mayte Arrington   ferrous sulfate (FE TABS 325) 325 (65 Fe) MG EC tablet Take 1 tablet by mouth daily (with breakfast) 7/12/21  Yes MAX Jason CNP   vitamin D (ERGOCALCIFEROL) 1.25 MG (95854 UT) CAPS capsule Take 1 capsule by mouth once a week 7/12/21  Yes MAX Jason CNP   dicyclomine (BENTYL) 10 MG capsule Take 1 capsule by mouth 4 times daily as needed (Cramps/Abdominal Pain) 6/4/21  Yes Jose Ballard,    hyoscyamine (LEVBID) 375 MCG extended release tablet Take 1 tablet by mouth every 12 hours as needed for Cramping 10/9/20  Yes Greg Stephens MD   MAGNESIUM PO Take by mouth daily  4/7/20  Yes Historical Provider, MD   Handicap Placard MISC by Does not apply route Issue parking placard for persons with disabilities. [facility] sec 4503.44. Expires in 5 years. Applicant meets the qualifying disability criteria.  9/16/20  Yes Jose Ballard DO   diclofenac sodium 1 % GEL Apply 2 g topically 4 times daily 7/18/18  Yes MAX Paris CNP   cyclobenzaprine (FLEXERIL) 10 MG tablet Take 1 tablet by mouth 3 times daily as needed for Muscle spasms 4/16/18  Yes MAX Paris CNP   Multiple Vitamins-Minerals (MULTI-VITAMIN/MINERALS PO) Take 1 tablet by mouth 2 times daily   Yes Historical Provider, MD Cholecalciferol (VITAMIN D3) 88855 units CAPS Take 1 capsule by mouth daily 8/1/17  Yes Aguilar Ballard,    docusate sodium (COLACE) 100 MG capsule Take 1 capsule by mouth 2 times daily 8/1/17  Yes Aguilar Ballard, DO       Allergies   Allergen Reactions    Latex Swelling     The powder in the gloves    Dilaudid [Hydromorphone Hcl] Nausea And Vomiting    Hydromorphone     Medrol [Methylprednisolone] Other (See Comments)     Back pain    Prednisone Nausea And Vomiting        reports that she quit smoking about 28 years ago. She has a 2.50 pack-year smoking history. She has never used smokeless tobacco.  reports current alcohol use. Family History   Problem Relation Age of Onset    Diabetes Mother         Passed due to DM at 62    Other Mother         enlarged heart    Dementia Father     Other Father         Brain tumor     Diabetes Maternal Aunt     Diabetes Maternal Uncle     Glaucoma Neg Hx             REVIEW OF SYSTEMS:  General:  No fever or chills  Eye:  negative   ENT:negative  Allergy/Immunology:  negative  Hematology/Lymphatic: negative  Lungs: no shortness of breast  Cardiovascular: substernal pain, which she attributes to GERD  Gastrointestinal: see above  : negative  Neurological: negative      PHYSICAL EXAM:    /82 (Site: Left Upper Arm, Position: Sitting, Cuff Size: Large Adult)   Pulse 117   Temp 98.5 °F (36.9 °C) (Tympanic)   Ht 5' (1.524 m)   Wt 171 lb (77.6 kg)   SpO2 97%   BMI 33.40 kg/m²       Gen: Alert and oriented x3, no acute distress, well-appearing    Eyes: PERRL, Sclera Anicteric    Head: Normocephalic, non tender     Neck: Supple, no significant adenopathy. No carotid bruits, thyroid normal size and no masses    Chest: CTA, no wheezes, no rales, no rhonchi, symmetrical    Heart: Normal rate, regular rhythm, no murmurs    Abdomen: Soft, positive bowel sounds, non distended, no masses, no hernias, no HSM, no bruits. Diffuse tenderness.   Perhaps more in LUQ and RLQ.  No guarding or rebound. Neuro: Normal speech, motor/sensory grossly normal bilateral    MSK: No joint tenderness, deformity, or swelling           ASSESSMENT:  1) Persistent abdominal pain  2) Diarrhea  3) No colitis on biopsies  4) Clearly has GERD  5) History of gastritis  6) Probably has IBS-D, but we should rule out more specific problems  7) Her rectal bleeding was likely due to her hemorrhoids    PLAN:  1) Complete stools studies  2) Screen for celiac  3) Resume omeprazole which has helped her previously  4) EGD    Risks and benefits of EGD (upper G.I. Endoscopy) were discussed with Pablo Yang. In particular I discussed the nature and limitations of the procedure. I also discussed the risks and consequences of reactions to the sedatives, bleeding and perforation. Alternate ways of evaluating the upper g.i tract were also discussed including barium swallow and CT scan were discussed. she was also given the opportunity to have any questions answered, and encouraged to call the office with additional issues.     Electronically signed by Yonas Baum MD on 8/4/2021 at 3:58 PM

## 2021-08-04 NOTE — TELEPHONE ENCOUNTER
Patient called x2. Writer spoke with nurse and states she was calling to confirm she is coming to her appointment at 3:30 pm. Patient confirmed appointment.

## 2021-08-09 ENCOUNTER — PRE-PROCEDURE TELEPHONE (OUTPATIENT)
Dept: PREADMISSION TESTING | Age: 61
End: 2021-08-09

## 2021-08-09 NOTE — TELEPHONE ENCOUNTER
Voicemail message left regarding a covid swab appt for August 13th at [address]. Instructed to call [phone] and confirm this appt time.

## 2021-08-09 NOTE — TELEPHONE ENCOUNTER
[facility] - 3rd attempt. Patient ambulated without difficulty sao2 95%     Cornel Yi RN  06/07/20 [id number]

## 2021-08-13 ENCOUNTER — HOSPITAL ENCOUNTER (OUTPATIENT)
Dept: PREADMISSION TESTING | Age: 61
Setting detail: SPECIMEN
Discharge: HOME | End: 2021-08-17
Payer: MEDICARE

## 2021-08-13 DIAGNOSIS — Z11.59 ENCOUNTER FOR SCREENING FOR OTHER VIRAL DISEASES: Primary | ICD-10-CM

## 2021-08-13 PROCEDURE — U0005 INFEC AGEN DETEC AMPLI PROBE: HCPCS

## 2021-08-13 PROCEDURE — U0003 INFECTIOUS AGENT DETECTION BY NUCLEIC ACID (DNA OR RNA); SEVERE ACUTE RESPIRATORY SYNDROME CORONAVIRUS 2 (SARS-COV-2) (CORONAVIRUS DISEASE [COVID-19]), AMPLIFIED PROBE TECHNIQUE, MAKING USE OF HIGH THROUGHPUT TECHNOLOGIES AS DESCRIBED BY CMS-2020-01-R: HCPCS

## 2021-08-14 LAB
SARS-COV-2: NORMAL
SARS-COV-2: NOT DETECTED
SOURCE: NORMAL

## 2021-08-17 NOTE — H&P
Name:  Blake Ramirez  Age:  59 y.o.   :  1960    Physician: Bibiana Sanabria MD       Chief Complaint: Abdominal pain      HPI: Patient was recently hospitalized with diarrhea, rectal bleeding and abdominal pain. Rectal bleeding has resolved but she continues to have some diarrhea. Also having ongoing abdominal pain. She does have a history of abdominal pain been evaluated several times in the past.  The pain she is complaining of currently is mainly left upper quadrant. She also has a lot of post prandial epigastric and substernal pain and wakes up at night with substernal pain as well. She used to be on omeprazole which she did find helpful but is not on it anymore. In fact now she uses mustard to treat her heartburn when she wakes up with it in the middle of the night. Her last EGD was in  and it did show some gastritis.         MEDICAL HISTORY:    Past Medical History:        Diagnosis Date    Anxiety     Asthma     Cerebral artery occlusion with cerebral infarction [facility])     Cerebrovascular disease     stroke     Depression     Diverticulitis     Fibromyalgia     History of small bowel obstruction     Hypertension     [facility]up[facility] [address]     Seizures ([facility] [address]  and        Past Surgical History:        Procedure Laterality Date    APPENDECTOMY       SECTION      x 4    COLONOSCOPY  2007    nl    COLONOSCOPY  2015    1 bx    COLONOSCOPY N/A 2020    COLONOSCOPY performed by Emma Bahena MD at [address] [place] [address] N/A 2021    COLONOSCOPY WITH BIOPSY performed by Emma Bahena MD at [place] [address]  2009    TAHBSO due to fibroids MMR    OTHER SURGICAL HISTORY Left 2016    C6 TFE    DE EGD TRANSORAL BIOPSY SINGLE/MULTIPLE N/A 2017    EGD BIOPSY performed by Emma Bahena MD at [address] [place] [address]  3/1/16    16 removed on bottom 1 on top     TUBAL LIGATION         Prior to Admission medications    Medication Sig Start Date End Date Taking? Authorizing Provider   omeprazole (PRILOSEC) 20 MG delayed release capsule Take 1 capsule by mouth every morning (before breakfast) 8/4/21   Snehal New MD   amLODIPine (NORVASC) 10 MG tablet Take 1 tablet by mouth daily 7/31/21   Amanda [place]   ferrous sulfate (FE TABS 325) 325 (65 Fe) MG EC tablet Take 1 tablet by mouth daily (with breakfast) 7/12/21   MAX Brennan CNP   vitamin D (ERGOCALCIFEROL) 1.25 MG (92045 UT) CAPS capsule Take 1 capsule by mouth once a week 7/12/21   MAX Brennan CNP   dicyclomine (BENTYL) 10 MG capsule Take 1 capsule by mouth 4 times daily as needed (Cramps/Abdominal Pain) 6/4/21   Farheen Laguerre DO   hyoscyamine (LEVBID) 375 MCG extended release tablet Take 1 tablet by mouth every 12 hours as needed for Cramping 10/9/20   Snehal New MD   MAGNESIUM PO Take by mouth daily  4/7/20   Historical Provider, MD   Handicap Placard MISC by Does not apply route Issue parking placard for persons with disabilities. [facility] sec 4503.44. Expires in 5 years. Applicant meets the qualifying disability criteria.  9/16/20   Elyl Ballard DO   diclofenac sodium 1 % GEL Apply 2 g topically 4 times daily 7/18/18   MAX Barry CNP   cyclobenzaprine (FLEXERIL) 10 MG tablet Take 1 tablet by mouth 3 times daily as needed for Muscle spasms 4/16/18   MAX Barry CNP   Multiple Vitamins-Minerals (MULTI-VITAMIN/MINERALS PO) Take 1 tablet by mouth 2 times daily    Historical Provider, MD   Cholecalciferol (VITAMIN D3) 07911 units CAPS Take 1 capsule by mouth daily 8/1/17   Farheen Laguerre DO   docusate sodium (COLACE) 100 MG capsule Take 1 capsule by mouth 2 times daily 8/1/17   Farheen Laguerre DO       Allergies   Allergen Reactions    Latex Swelling     The powder in the gloves    Dilaudid [Hydromorphone Hcl] Nausea And Vomiting    Medrol [Methylprednisolone] Other (See Comments)     Back pain    Prednisone Nausea And Vomiting        reports that she quit smoking about 28 years ago. She has a 2.50 pack-year smoking history. She has never used smokeless tobacco.  reports current alcohol use. Family History   Problem Relation Age of Onset    Diabetes Mother         Passed due to DM at 62    Other Mother         enlarged heart    Dementia Father     Other Father         Brain tumor     Diabetes Maternal Aunt     Diabetes Maternal Uncle     Glaucoma Neg Hx             REVIEW OF SYSTEMS:  General:  No fever or chills  Eye:  negative   ENT:negative  Allergy/Immunology:  negative  Hematology/Lymphatic: negative  Lungs: no shortness of breast  Cardiovascular: substernal pain, which she attributes to GERD  Gastrointestinal: see above  : negative  Neurological: negative      PHYSICAL EXAM:    There were no vitals taken for this visit. Gen: Alert and oriented x3, no acute distress, well-appearing    Eyes: PERRL, Sclera Anicteric    Head: Normocephalic, non tender     Neck: Supple, no significant adenopathy. No carotid bruits, thyroid normal size and no masses    Chest: CTA, no wheezes, no rales, no rhonchi, symmetrical    Heart: Normal rate, regular rhythm, no murmurs    Abdomen: Soft, positive bowel sounds, non distended, no masses, no hernias, no HSM, no bruits. Diffuse tenderness. Perhaps more in LUQ and RLQ. No guarding or rebound. Neuro: Normal speech, motor/sensory grossly normal bilateral    MSK: No joint tenderness, deformity, or swelling           ASSESSMENT:  1) Persistent abdominal pain  2) Diarrhea  3) No colitis on biopsies  4) Clearly has GERD  5) History of gastritis  6) Probably has IBS-D, but we should rule out more specific problems  7) Her rectal bleeding was likely due to her hemorrhoids    PLAN:  1) Complete stools studies  2) Screen for celiac  3) Resume omeprazole which has helped her previously  4) EGD    Risks and benefits of EGD (upper G. I. Endoscopy) were discussed with Kurtis Pineda. In particular I discussed the nature and limitations of the procedure. I also discussed the risks and consequences of reactions to the sedatives, bleeding and perforation. Alternate ways of evaluating the upper g.i tract were also discussed including barium swallow and CT scan were discussed. she was also given the opportunity to have any questions answered, and encouraged to call the office with additional issues.     Electronically signed by Santo Aleman MD on 8/17/2021 at 4:36 PM

## 2021-08-18 ENCOUNTER — ANESTHESIA (OUTPATIENT)
Dept: OPERATING ROOM | Age: 61
End: 2021-08-18
Payer: MEDICARE

## 2021-08-18 ENCOUNTER — ANESTHESIA EVENT (OUTPATIENT)
Dept: OPERATING ROOM | Age: 61
End: 2021-08-18
Payer: MEDICARE

## 2021-08-18 ENCOUNTER — HOSPITAL ENCOUNTER (OUTPATIENT)
Age: 61
Setting detail: OUTPATIENT SURGERY
Discharge: HOME | End: 2021-08-18
Attending: SURGERY | Admitting: SURGERY
Payer: MEDICARE

## 2021-08-18 VITALS
HEART RATE: 90 BPM | RESPIRATION RATE: 18 BRPM | HEIGHT: 60 IN | WEIGHT: 174.2 LBS | OXYGEN SATURATION: 97 % | BODY MASS INDEX: 34.2 KG/M2 | SYSTOLIC BLOOD PRESSURE: 100 MMHG | DIASTOLIC BLOOD PRESSURE: 59 MMHG | TEMPERATURE: 97.1 F

## 2021-08-18 VITALS — SYSTOLIC BLOOD PRESSURE: 154 MMHG | DIASTOLIC BLOOD PRESSURE: 78 MMHG | OXYGEN SATURATION: 97 %

## 2021-08-18 PROBLEM — K21.9 GASTROESOPHAGEAL REFLUX DISEASE WITHOUT ESOPHAGITIS: Status: ACTIVE | Noted: 2021-08-18

## 2021-08-18 PROCEDURE — 2580000003 HC RX 258: Performed by: SURGERY

## 2021-08-18 PROCEDURE — 7100000011 HC PHASE II RECOVERY - ADDTL 15 MIN: Performed by: SURGERY

## 2021-08-18 PROCEDURE — 2500000003 HC RX 250 WO HCPCS: Performed by: NURSE ANESTHETIST, CERTIFIED REGISTERED

## 2021-08-18 PROCEDURE — 3700000000 HC ANESTHESIA ATTENDED CARE: Performed by: SURGERY

## 2021-08-18 PROCEDURE — 3609012400 HC EGD TRANSORAL BIOPSY SINGLE/MULTIPLE: Performed by: SURGERY

## 2021-08-18 PROCEDURE — 6360000002 HC RX W HCPCS: Performed by: NURSE ANESTHETIST, CERTIFIED REGISTERED

## 2021-08-18 PROCEDURE — 88305 TISSUE EXAM BY PATHOLOGIST: CPT

## 2021-08-18 PROCEDURE — 7100000010 HC PHASE II RECOVERY - FIRST 15 MIN: Performed by: SURGERY

## 2021-08-18 PROCEDURE — 2709999900 HC NON-CHARGEABLE SUPPLY: Performed by: SURGERY

## 2021-08-18 PROCEDURE — 88342 IMHCHEM/IMCYTCHM 1ST ANTB: CPT

## 2021-08-18 PROCEDURE — 3700000001 HC ADD 15 MINUTES (ANESTHESIA): Performed by: SURGERY

## 2021-08-18 RX ORDER — LIDOCAINE HYDROCHLORIDE 40 MG/ML
INJECTION, SOLUTION RETROBULBAR; TOPICAL PRN
Status: DISCONTINUED | OUTPATIENT
Start: 2021-08-18 | End: 2021-08-18 | Stop reason: SDUPTHER

## 2021-08-18 RX ORDER — SODIUM CHLORIDE, SODIUM LACTATE, POTASSIUM CHLORIDE, CALCIUM CHLORIDE 600; 310; 30; 20 MG/100ML; MG/100ML; MG/100ML; MG/100ML
INJECTION, SOLUTION INTRAVENOUS CONTINUOUS
Status: DISCONTINUED | OUTPATIENT
Start: 2021-08-18 | End: 2021-08-18 | Stop reason: HOSPADM

## 2021-08-18 RX ORDER — PROPOFOL 10 MG/ML
INJECTION, EMULSION INTRAVENOUS PRN
Status: DISCONTINUED | OUTPATIENT
Start: 2021-08-18 | End: 2021-08-18 | Stop reason: SDUPTHER

## 2021-08-18 RX ADMIN — LIDOCAINE HYDROCHLORIDE 100 MG: 40 INJECTION, SOLUTION RETROBULBAR; TOPICAL at 09:49

## 2021-08-18 RX ADMIN — SODIUM CHLORIDE, POTASSIUM CHLORIDE, SODIUM LACTATE AND CALCIUM CHLORIDE: 600; 310; 30; 20 INJECTION, SOLUTION INTRAVENOUS at 09:20

## 2021-08-18 RX ADMIN — PROPOFOL 200 MG: 10 INJECTION, EMULSION INTRAVENOUS at 09:49

## 2021-08-18 ASSESSMENT — PAIN - FUNCTIONAL ASSESSMENT: PAIN_FUNCTIONAL_ASSESSMENT: 0-10

## 2021-08-18 ASSESSMENT — PAIN SCALES - GENERAL
PAINLEVEL_OUTOF10: 0
PAINLEVEL_OUTOF10: 0

## 2021-08-18 NOTE — ANESTHESIA PRE PROCEDURE
Department of Anesthesiology  Preprocedure Note       Name:  Ginny Ramirez   Age:  59 y.o.  :  1960                                          MRN:  [id number]         Date:  2021      Surgeon: Devang Miles):  Michelle Quick MD    Procedure: Procedure(s):  EGD    Medications prior to admission:   Prior to Admission medications    Medication Sig Start Date End Date Taking? Authorizing Provider   omeprazole (PRILOSEC) 20 MG delayed release capsule Take 1 capsule by mouth every morning (before breakfast) 21  Yes Michelle Quick MD   amLODIPine (NORVASC) 10 MG tablet Take 1 tablet by mouth daily 21  Yes Elissa Pemberton   vitamin D (ERGOCALCIFEROL) 1.25 MG (97910 UT) CAPS capsule Take 1 capsule by mouth once a week 21  Yes MAX Sims CNP   dicyclomine (BENTYL) 10 MG capsule Take 1 capsule by mouth 4 times daily as needed (Cramps/Abdominal Pain) 21  Yes Johnny Gilbert Black, DO   hyoscyamine (LEVBID) 375 MCG extended release tablet Take 1 tablet by mouth every 12 hours as needed for Cramping 10/9/20  Yes Michelle Quick MD   MAGNESIUM PO Take by mouth daily  20  Yes Historical Provider, MD   cyclobenzaprine (FLEXERIL) 10 MG tablet Take 1 tablet by mouth 3 times daily as needed for Muscle spasms 18  Yes MAX Orellana CNP   Multiple Vitamins-Minerals (MULTI-VITAMIN/MINERALS PO) Take 1 tablet by mouth 2 times daily   Yes Historical Provider, MD   Cholecalciferol (VITAMIN D3) 57467 units CAPS Take 1 capsule by mouth daily 17  Yes Johnny Gilbert Black, DO   docusate sodium (COLACE) 100 MG capsule Take 1 capsule by mouth 2 times daily 17  Yes Stoney Savbrody Black, DO   ferrous sulfate (FE TABS 325) 325 (65 Fe) MG EC tablet Take 1 tablet by mouth daily (with breakfast) 21   MAX Sims CNP   Handicap Placard MISC by Does not apply route Issue parking placard for persons with disabilities. [facility] sec 4503.44. Expires in 5 years.   Applicant meets the qualifying disability criteria. 9/16/20   Meg Diaz DO   diclofenac sodium 1 % GEL Apply 2 g topically 4 times daily 7/18/18   MAX Mendoza - CNP       Current medications:    Current Facility-Administered Medications   Medication Dose Route Frequency Provider Last Rate Last Admin    lactated ringers infusion   Intravenous Continuous Ashok Sanabria  mL/hr at 08/18/21 0920 New Bag at 08/18/21 0920       Allergies: Allergies   Allergen Reactions    Latex Swelling     The powder in the gloves    Dilaudid [Hydromorphone Hcl] Nausea And Vomiting    Medrol [Methylprednisolone] Other (See Comments)     Back pain    Prednisone Nausea And Vomiting       Problem List:    Patient Active Problem List   Diagnosis Code    Hyperopia of both eyes with astigmatism and presbyopia H52.03, H52.203, H52.4    Cortical cataract of left eye H26.9    Lupus (HCC) M32.9    Fibromyalgia M79.7    Anxiety F41.9    Depression F32.9    IBS (irritable bowel syndrome) K58.9    Anemia D64.9    Chest pain R07.9    [facility] K56.609    Right hip pain M25.551    Neck pain M54.2    Cervical radicular pain M54.12    Left upper quadrant pain R10.12    Epigastric pain R10.13    Facet arthritis of cervical region M47.812    Chronic left shoulder pain M25.512, G89.29    Encounter for chronic pain management G89.29    Vitamin D deficiency E55.9    Near syncope R55    Primary insomnia F51.01    Pain in joint M25.50    Open angle with borderline findings and low glaucoma risk in both eyes H40.013    Ocular migraine G43. 109    Leukocytosis D72.829    Keratitis H16.9    Joint stiffness M25.60    Dry eye syndrome of both lacrimal glands H04.123    Colitis K52.9    Benign essential hypertension I10    Adjustment disorder with anxious mood F43.22    Osteoarthritis of hip, unspecified M16.9    Diarrhea R19.7    Lower abdominal pain R10.30    Diverticulitis K57.92       Past Medical History:        Diagnosis Date    Anxiety     Asthma     Cerebral artery occlusion with cerebral infarction [facility]) 2009    Cerebrovascular disease     stroke 1998    Depression     Diverticulitis     Fibromyalgia     History of small bowel obstruction     Hypertension     [facility] [address]     Seizures ([facility] [address]  and        Past Surgical History:        Procedure Laterality Date    APPENDECTOMY       SECTION      x 4    COLONOSCOPY  2007    nl    COLONOSCOPY  2015    1 bx    COLONOSCOPY N/A 2020    COLONOSCOPY performed by Marley Cifuentes MD at [address] [place] [address] N/A 2021    COLONOSCOPY WITH BIOPSY performed by Marley Cifuentes MD at [place] [address]  2009    TAHBSO due to fibroids MMR    OTHER SURGICAL HISTORY Left 2016    C6 TFE    PA EGD TRANSORAL BIOPSY SINGLE/MULTIPLE N/A 2017    EGD BIOPSY performed by Marley Cifuentes MD at [address] [facility]  3/1/16    16 removed on bottom 1 on top     TUBAL LIGATION         Social History:    Social History     Tobacco Use    Smoking status: Former Smoker     Packs/day: 0.25     Years: 10.00     Pack years: 2.50     Quit date: 1993     Years since quittin.3    Smokeless tobacco: Never Used    Tobacco comment: alecia bernabe 3/12/17   Substance Use Topics    Alcohol use: Yes     Alcohol/week: 0.0 standard drinks     Comment: has about 1 or 2 a year.  very rarely                                Counseling given: Not Answered  Comment: alecia bernabe 3/12/17      Vital Signs (Current):   Vitals:    21 0913   BP: (!) 147/71   Pulse: 101   Resp: 16   Temp: 36.7 °C (98.1 °F)   TempSrc: Oral   SpO2: 99%   Weight: 174 lb 3.2 oz (79 kg)   Height: 5' (1.524 m)                                              BP Readings from Last 3 Encounters:   21 (!) 147/71   21 128/82   21 126/73       NPO Status: Time of last liquid consumption: 2100                        Time of last solid consumption: 2100                        Date of last liquid consumption: 08/17/21                        Date of last solid food consumption: 08/17/21    BMI:   Wt Readings from Last 3 Encounters:   08/18/21 174 lb 3.2 oz (79 kg)   08/04/21 171 lb (77.6 kg)   07/30/21 181 lb 1.6 oz (82.1 kg)     Body mass index is 34.02 kg/m². CBC:   Lab Results   Component Value Date    WBC 6.6 07/30/2021    RBC 3.74 07/30/2021    HGB 9.3 07/30/2021    HCT 30.2 07/30/2021    MCV 80.7 07/30/2021    RDW 15.1 07/30/2021     07/30/2021       CMP:   Lab Results   Component Value Date     07/30/2021    K 3.8 07/30/2021     07/30/2021    CO2 24 07/30/2021    BUN 6 07/30/2021    CREATININE 0.72 07/30/2021    GFRAA >60 07/30/2021    LABGLOM >60 07/30/2021    GLUCOSE 89 07/30/2021    PROT 8.5 07/27/2021    CALCIUM 8.5 07/30/2021    BILITOT 0.25 07/27/2021    ALKPHOS 85 07/27/2021    AST 21 07/27/2021    ALT 12 07/27/2021       POC Tests: No results for input(s): POCGLU, POCNA, POCK, POCCL, POCBUN, POCHEMO, POCHCT in the last 72 hours.     Coags:   Lab Results   Component Value Date    PROTIME 14.1 07/29/2021    INR 1.1 07/29/2021    APTT 25.9 07/29/2021       HCG (If Applicable): No results found for: PREGTESTUR, PREGSERUM, HCG, HCGQUANT     ABGs: No results found for: PHART, PO2ART, AQY1RMO, YNR9OAD, BEART, Z8ZYQUTL     Type & Screen (If Applicable):  No results found for: LABABO, LABRH    Drug/Infectious Status (If Applicable):  Lab Results   Component Value Date    HEPCAB NONREACTIVE 04/14/2016       COVID-19 Screening (If Applicable):   Lab Results   Component Value Date    COVID19 Not Detected 08/13/2021    COVID19 Not Detected 09/24/2020           Anesthesia Evaluation    Airway: Mallampati: II  TM distance: >3 FB   Neck ROM: full  Mouth opening: > = 3 FB Dental:    (+) edentulous      Pulmonary:normal exam    (+) asthma: Cardiovascular:    (+) hypertension:,                   Neuro/Psych:   (+) neuromuscular disease:, psychiatric history:            GI/Hepatic/Renal:   (+) GERD:,           Endo/Other:    (+) : arthritis:., .                 Abdominal:             Vascular: Other Findings:             Anesthesia Plan      general and TIVA     ASA 2       Induction: intravenous. Anesthetic plan and risks discussed with patient.                       MAX Street - EARNEST   8/18/2021

## 2021-08-18 NOTE — ANESTHESIA POSTPROCEDURE EVALUATION
Department of Anesthesiology  Postprocedure Note    Patient: Maxwell Kimbrough  MRN: [id number]  YOB: 1960  Date of evaluation: 8/18/2021  Time:  10:05 AM     Procedure Summary     Date: 08/18/21 Room / Location: [address]    Anesthesia Start: 0669 Anesthesia Stop: 6588    Procedure: EGD BIOPSY (N/A ) Diagnosis: (GERD, right epigastric pain)    Surgeons: Fabi Ambriz MD Responsible Provider: MAX Bustillo CRNA    Anesthesia Type: general, TIVA ASA Status: 2          Anesthesia Type: general, TIVA    Pily Phase I: Iply Score: 10    Pily Phase II:      Last vitals: Reviewed and per EMR flowsheets.        Anesthesia Post Evaluation    Patient location during evaluation: PACU  Patient participation: complete - patient participated  Level of consciousness: awake and alert  Pain score: 0  Airway patency: patent  Nausea & Vomiting: no nausea and no vomiting  Complications: no  Cardiovascular status: blood pressure returned to baseline and hemodynamically stable  Respiratory status: acceptable  Hydration status: euvolemic
decreased strength

## 2021-08-18 NOTE — OP NOTE
Operative Note      Patient: Chay Butler  YOB: 1960  MRN: [id number]    Date of Procedure: 8/18/2021    Pre-Op Diagnosis: GERD, right epigastric pain    Post-Op Diagnosis: Same and antral gastritis       Procedure(s):  EGD BIOPSY    Surgeon(s):  Ann Epley, MD    Assistant:   * No surgical staff found *    Anesthesia: General    Estimated Blood Loss (mL): Minimal    Complications: None    Specimens:   ID Type Source Tests Collected by Time Destination   A : Antrum Bx Tissue Stomach SURGICAL PATHOLOGY Ann Epley, MD 8/18/2021 5610    B : Body Bx Tissue Stomach SURGICAL PATHOLOGY Ann Epley, MD 8/18/2021 4066    C : Distal Esophagus Bx Tissue Esophagus SURGICAL PATHOLOGY Ann Epley, MD 8/18/2021 9818        Implants:  * No implants in log *      Drains: * No LDAs found *    Findings: Patient brought to the endoscopy area and IV sedation was induced by the CRNA. Spent her mouth on her esophagus into her stomach and duodenum. Esophagus looks pretty good did not see any obvious inflammation. She is got some pretty significant patchy erythema in her antrum consistent with some antral gastritis. No alicia ulceration was seen though. Duodenum looks pretty normal well down the third portion. Scope was brought back in the stomach and retroflexed minimal hiatal hernia. Went ahead and did multiple antral biopsies then some random biopsies throughout the body the stomach. For completeness I went ahead and did some distal esophageal biopsies just in case she has any eosinophilic esophagitis. We will see what the biopsy results show and make additional recommendations based upon those results.   Electronically signed by Chloe Judd MD on 8/18/2021 at 10:00 AM

## 2021-08-23 LAB — SURGICAL PATHOLOGY REPORT: NORMAL

## 2021-08-24 ENCOUNTER — OFFICE VISIT (OUTPATIENT)
Dept: PRIMARY CARE CLINIC | Age: 61
End: 2021-08-24
Payer: MEDICARE

## 2021-08-24 ENCOUNTER — TELEPHONE (OUTPATIENT)
Dept: FAMILY MEDICINE CLINIC | Age: 61
End: 2021-08-24

## 2021-08-24 ENCOUNTER — HOSPITAL ENCOUNTER (OUTPATIENT)
Dept: GENERAL RADIOLOGY | Age: 61
Discharge: HOME | End: 2021-08-26
Payer: MEDICARE

## 2021-08-24 VITALS
HEIGHT: 60 IN | DIASTOLIC BLOOD PRESSURE: 62 MMHG | BODY MASS INDEX: 34.36 KG/M2 | HEART RATE: 104 BPM | OXYGEN SATURATION: 98 % | WEIGHT: 175 LBS | SYSTOLIC BLOOD PRESSURE: 122 MMHG

## 2021-08-24 DIAGNOSIS — R20.0 LEFT ARM NUMBNESS: ICD-10-CM

## 2021-08-24 DIAGNOSIS — R20.0 LEFT ARM NUMBNESS: Primary | ICD-10-CM

## 2021-08-24 DIAGNOSIS — M25.512 ACUTE PAIN OF LEFT SHOULDER: ICD-10-CM

## 2021-08-24 DIAGNOSIS — M50.30 DEGENERATIVE CERVICAL DISC: Primary | ICD-10-CM

## 2021-08-24 PROCEDURE — 3017F COLORECTAL CA SCREEN DOC REV: CPT | Performed by: FAMILY MEDICINE

## 2021-08-24 PROCEDURE — 1111F DSCHRG MED/CURRENT MED MERGE: CPT | Performed by: FAMILY MEDICINE

## 2021-08-24 PROCEDURE — G8417 CALC BMI ABV UP PARAM F/U: HCPCS | Performed by: FAMILY MEDICINE

## 2021-08-24 PROCEDURE — 99214 OFFICE O/P EST MOD 30 MIN: CPT | Performed by: FAMILY MEDICINE

## 2021-08-24 PROCEDURE — 73030 X-RAY EXAM OF SHOULDER: CPT

## 2021-08-24 PROCEDURE — G8427 DOCREV CUR MEDS BY ELIG CLIN: HCPCS | Performed by: FAMILY MEDICINE

## 2021-08-24 PROCEDURE — 1036F TOBACCO NON-USER: CPT | Performed by: FAMILY MEDICINE

## 2021-08-24 PROCEDURE — 72040 X-RAY EXAM NECK SPINE 2-3 VW: CPT

## 2021-08-24 PROCEDURE — 99212 OFFICE O/P EST SF 10 MIN: CPT | Performed by: FAMILY MEDICINE

## 2021-08-24 RX ORDER — KETOROLAC TROMETHAMINE 30 MG/ML
30 INJECTION, SOLUTION INTRAMUSCULAR; INTRAVENOUS ONCE
Status: COMPLETED | OUTPATIENT
Start: 2021-08-24 | End: 2021-08-24

## 2021-08-24 RX ORDER — HYDROCODONE BITARTRATE AND ACETAMINOPHEN 5; 325 MG/1; MG/1
1 TABLET ORAL EVERY 6 HOURS PRN
Qty: 20 TABLET | Refills: 0 | Status: SHIPPED | OUTPATIENT
Start: 2021-08-24 | End: 2021-08-29

## 2021-08-24 RX ADMIN — KETOROLAC TROMETHAMINE 30 MG: 30 INJECTION, SOLUTION INTRAMUSCULAR at 15:25

## 2021-08-24 NOTE — TELEPHONE ENCOUNTER
Pt calling stating she's having muscle spasms in her shoulders since Saturday, it's caused numbness and weakness to her L arm, hand and finger, pt is taking Tylenol and Flexeril, pt wants an appt, pt feels that the writer is unable to take any information from the pt and writer is not a nurse, please call.

## 2021-08-24 NOTE — PATIENT INSTRUCTIONS
Patient Education        Pinched Nerve in the Neck: Care Instructions  Your Care Instructions  A pinched nerve in the neck happens when a vertebra or disc in the upper part of your spine is damaged. This damage can happen because of an injury. Or it can just happen with age. The changes caused by the damage may put pressure on a nearby nerve root, pinching it. This causes symptoms such as sharp pain in your neck, shoulder, arm, hand, or back. You may also have tingling or numbness. Sometimes it makes your arm weaker. The symptoms are usually worse when you turn your head or strain your neck. For many people, the symptoms get better over time and finally go away. Early treatment usually includes medicines for pain and swelling. Sometimes physical therapy and special exercises may help. Follow-up care is a key part of your treatment and safety. Be sure to make and go to all appointments, and call your doctor if you are having problems. It's also a good idea to know your test results and keep a list of the medicines you take. How can you care for yourself at home? · Be safe with medicines. Read and follow all instructions on the label. ? If the doctor gave you a prescription medicine for pain, take it as prescribed. ? If you are not taking a prescription pain medicine, ask your doctor if you can take an over-the-counter medicine. · Try using a heating pad on a low or medium setting for 15 to 20 minutes every 2 or 3 hours. Try a warm shower in place of one session with the heating pad. You can also buy single-use heat wraps that last up to 8 hours. · You can also try an ice pack for 10 to 15 minutes every 2 to 3 hours. There isn't strong evidence that either heat or ice will help. But you can try them to see if they help you. · Don't spend too long in one position. Take short breaks to move around and change positions. · Wear a seat belt and shoulder harness when you are in a car.   · Sleep with a pillow that you have been prescribed may cause drowsiness and impair your ability to operate vehicles or machinery. Do not drive or operate machinery while taking narcotic pain medication. Do not use in combination with alcohol.

## 2021-08-24 NOTE — PROGRESS NOTES
[facility]             [address] [place] [address] Rd                      Telephone [phone]             Fax [phone]       Raleigh Ramirez  1960  MRN:  [id number]  Date of visit:  8/24/2021     Subjective:    Kurtis Pineda is a 59 y.o. female who presents to [facility] today (8/24/2021) for evaluation of:  Shoulder Pain (muscle spasm, started Saturday, states arm is numb, has systemic lupus)      She states that she has a history of lupus. She has intermittent flares of muscle and joint pains. She reports severe left shoulder pain that began 8/21/2021. She denies injury. She took Tylenol and Flexeril for the pain. She was recently advised to avoid NSAIDs due to gi issues. She reports that she continues to have pain in the left shoulder and upper arm. She is also having numbness in the left hand. She denies chest pain.       She has the following problem list:  Patient Active Problem List   Diagnosis    Hyperopia of both eyes with astigmatism and presbyopia    Cortical cataract of left eye    Lupus ([address]    Fibromyalgia    Anxiety    Depression    IBS (irritable bowel syndrome)    Anemia    Chest pain    SBO (small bowel obstruction) ([facility]    Right hip pain    Neck pain    Cervical radicular pain    Left upper quadrant pain    Epigastric pain    Facet arthritis of cervical region    Chronic left shoulder pain    Encounter for chronic pain management    Vitamin D deficiency    Near syncope    Primary insomnia    Pain in joint    Open angle with borderline findings and low glaucoma risk in both eyes    Ocular migraine    Leukocytosis    Keratitis    Joint stiffness    Dry eye syndrome of both lacrimal glands    Colitis    Benign essential hypertension    Adjustment disorder with anxious mood    Osteoarthritis of hip, unspecified    Diarrhea    Lower abdominal pain    Diverticulitis    Gastroesophageal reflux disease without esophagitis        Current medications are:  Outpatient Medications Marked as Taking for the 8/24/21 encounter (Office Visit) with Candice Forrester MD   Medication Sig Dispense Refill    omeprazole (PRILOSEC) 20 MG delayed release capsule Take 1 capsule by mouth every morning (before breakfast) 30 capsule 0    amLODIPine (NORVASC) 10 MG tablet Take 1 tablet by mouth daily 30 tablet 0    vitamin D (ERGOCALCIFEROL) 1.25 MG (86771 UT) CAPS capsule Take 1 capsule by mouth once a week 12 capsule 0    dicyclomine (BENTYL) 10 MG capsule Take 1 capsule by mouth 4 times daily as needed (Cramps/Abdominal Pain) 120 capsule 2    hyoscyamine (LEVBID) 375 MCG extended release tablet Take 1 tablet by mouth every 12 hours as needed for Cramping 60 tablet 3    MAGNESIUM PO Take by mouth daily       diclofenac sodium 1 % GEL Apply 2 g topically 4 times daily 3 Tube 3    cyclobenzaprine (FLEXERIL) 10 MG tablet Take 1 tablet by mouth 3 times daily as needed for Muscle spasms 90 tablet 11    Multiple Vitamins-Minerals (MULTI-VITAMIN/MINERALS PO) Take 1 tablet by mouth 2 times daily      Cholecalciferol (VITAMIN D3) 12953 units CAPS Take 1 capsule by mouth daily 30 capsule 5    docusate sodium (COLACE) 100 MG capsule Take 1 capsule by mouth 2 times daily 60 capsule 5        She is allergic to latex, dilaudid [hydromorphone hcl], medrol [methylprednisolone], and prednisone. She  reports that she quit smoking about 28 years ago. She has a 2.50 pack-year smoking history. She has never used smokeless tobacco.      Objective:    Vitals:    08/24/21 1422   BP: 122/62   Site: Right Upper Arm   Position: Sitting   Cuff Size: Large Adult   Pulse: 104   SpO2: 98%   Weight: 175 lb (79.4 kg)   Height: 5' (1.524 m)     Body mass index is 34.18 kg/m². Well-nourished, well-developed obese female, alert, and cooperative. She is anxious, but is otherwise in no acute distress. There is no tenderness to palpation of the neck posteriorly in the midline. There is moderate tenderness to palpation of the left shoulder posteriorly. The left shoulder has normal range of motion. She reports pain with range of motion of the left shoulder. There is mild tenderness to palpation of the left upper arm posteriorly. There is decreased sensation in the left lower arm and left hand compared to the right. Assessment and Plan:    1. Left arm numbness  2. Acute pain of left shoulder  Cervical radiculopathy vs. other  X-rays of the cervical spine and left shoulder were ordered:  - XR CERVICAL SPINE (2-3 VIEWS); Future  - XR SHOULDER LEFT (MIN 2 VIEWS); Future    As above, she does not tolerate steroids. Controlled substances monitoring: No signs of potential drug abuse or diversion identified when the [place] from [place], [place], and [place] was reviewed today. The activity on the report was consistent with the treatment plan. Norco was prescribed:  - HYDROcodone-acetaminophen (NORCO) 5-325 MG per tablet; Take 1 tablet by mouth every 6 hours as needed for Pain for up to 5 days. Intended supply: 5 days. Take lowest dose possible to manage pain  Dispense: 20 tablet; Refill: 0    As above, she was recently advised to avoid NSAIDs due to gi issues. - ketorolac (TORADOL) injection 30 mg      She will be contacted when the radiologist's interpretation of her x-rays is available. She was advised not to drive or operate machinery while taking narcotic pain medication. She was also advised not to take this medication in combination with alcohol. Printed information regarding Pinched Nerve in the Neck was provided to the patient with her after visit summary. She was advised to follow up if symptoms worsen or do not resolve.      (Please note that portions of this note were completed with a voice-recognition program. Efforts were made to edit the dictation but occasionally words

## 2021-08-25 NOTE — TELEPHONE ENCOUNTER
Call to pt, advised she went to UC yesterday. XR results given, PT referral placed per Dr. Lucas Moreno. Pt voiced understanding.

## 2021-09-03 ENCOUNTER — HOSPITAL ENCOUNTER (OUTPATIENT)
Dept: PHYSICAL THERAPY | Age: 61
Setting detail: THERAPIES SERIES
Discharge: HOME | End: 2021-09-03
Payer: MEDICARE

## 2021-09-03 NOTE — PROGRESS NOTES
Physical Therapy    Outpatient Physical Therapy    [x] [place]  Phone: [phone]  Fax: [phone]      [] [place]  Phone: [phone]  Fax: [phone]    Physical Therapy  Cancellation/No-show Note  Patient Name:  Juan Francisco Ramirez  :  1960   Date:  9/3/2021  Cancelled visits to date: 0  No-shows to date: 1    For today's appointment patient:  []  Cancelled  []  Rescheduled appointment  [x]  No-show     Reason given by patient:  []  Patient ill  []  Conflicting appointment  []  No transportation    []  Conflict with work  []  No reason given  []  Other:     Comments:      Electronically signed by: Hood Haley PT

## 2021-09-22 ENCOUNTER — TELEPHONE (OUTPATIENT)
Dept: FAMILY MEDICINE CLINIC | Age: 61
End: 2021-09-22

## 2021-09-22 ENCOUNTER — OFFICE VISIT (OUTPATIENT)
Dept: PRIMARY CARE CLINIC | Age: 61
End: 2021-09-22
Payer: MEDICARE

## 2021-09-22 VITALS
HEART RATE: 76 BPM | SYSTOLIC BLOOD PRESSURE: 130 MMHG | WEIGHT: 173 LBS | DIASTOLIC BLOOD PRESSURE: 80 MMHG | BODY MASS INDEX: 33.96 KG/M2 | HEIGHT: 60 IN

## 2021-09-22 DIAGNOSIS — M54.12 CERVICAL RADICULOPATHY: Primary | ICD-10-CM

## 2021-09-22 PROCEDURE — 1036F TOBACCO NON-USER: CPT | Performed by: PHYSICIAN ASSISTANT

## 2021-09-22 PROCEDURE — G8417 CALC BMI ABV UP PARAM F/U: HCPCS | Performed by: PHYSICIAN ASSISTANT

## 2021-09-22 PROCEDURE — PBSHW PBB SHADOW CHARGE: Performed by: PHYSICIAN ASSISTANT

## 2021-09-22 PROCEDURE — 99213 OFFICE O/P EST LOW 20 MIN: CPT | Performed by: PHYSICIAN ASSISTANT

## 2021-09-22 PROCEDURE — G8428 CUR MEDS NOT DOCUMENT: HCPCS | Performed by: PHYSICIAN ASSISTANT

## 2021-09-22 PROCEDURE — 99212 OFFICE O/P EST SF 10 MIN: CPT | Performed by: PHYSICIAN ASSISTANT

## 2021-09-22 PROCEDURE — 3017F COLORECTAL CA SCREEN DOC REV: CPT | Performed by: PHYSICIAN ASSISTANT

## 2021-09-22 RX ORDER — BETAMETHASONE SODIUM PHOSPHATE AND BETAMETHASONE ACETATE 3; 3 MG/ML; MG/ML
12 INJECTION, SUSPENSION INTRA-ARTICULAR; INTRALESIONAL; INTRAMUSCULAR; SOFT TISSUE ONCE
Status: COMPLETED | OUTPATIENT
Start: 2021-09-22 | End: 2021-09-22

## 2021-09-22 RX ADMIN — BETAMETHASONE SODIUM PHOSPHATE AND BETAMETHASONE ACETATE 12 MG: 3; 3 INJECTION, SUSPENSION INTRA-ARTICULAR; INTRALESIONAL; INTRAMUSCULAR at 11:00

## 2021-09-22 ASSESSMENT — ENCOUNTER SYMPTOMS: RESPIRATORY NEGATIVE: 1

## 2021-09-22 NOTE — TELEPHONE ENCOUNTER
Patient presented to the window for her appt at 9:43am after signing in at 9:42am. Writer asked patient if she was aware her appt was scheduled for 9:20am. Patient stated she had called early in the morning and the person she spoke with told her that her appt was at 9:50am. Writer apologized and offered to speak with the nurse before rescheduling. Patient repeated that she was told 9:50 several times, but stated if writer felt it was necessary despite her medical concerns, to reschedule the appt. Writer again stated that the nurse could be asked before rescheduling. Patient agreed to this. Writer spoke with Alton Day spoke with Dr. Rock Villa. Dr. Rock Villa agreed that patient should be rescheduled. Writer informed patient of this, and patient replied \"Ok, then call me. \" At this time, Michael De had walked to the desk, and writer informed Michael Lighttravis that patient requested a call to reschedule, that she did not wish to reschedule at the window. Patient then came back to the window and stated that she no longer wanted a call to reschedule, that she would go elsewhere.

## 2021-10-04 ENCOUNTER — HOSPITAL ENCOUNTER (OUTPATIENT)
Dept: MRI IMAGING | Age: 61
Discharge: HOME | End: 2021-10-06
Payer: MEDICARE

## 2021-10-04 DIAGNOSIS — M54.12 CERVICAL RADICULOPATHY: ICD-10-CM

## 2021-10-04 PROCEDURE — 72141 MRI NECK SPINE W/O DYE: CPT

## 2021-10-06 ENCOUNTER — TELEPHONE (OUTPATIENT)
Dept: FAMILY MEDICINE CLINIC | Age: 61
End: 2021-10-06

## 2021-10-06 NOTE — TELEPHONE ENCOUNTER
----- Message from Naila Perham, [place] sent at 10/6/2021  4:41 PM EDT -----  Multilevel degenerative changes with mild spinal stenosis.   Recommend pain management referral.

## 2021-10-11 ENCOUNTER — TELEPHONE (OUTPATIENT)
Dept: FAMILY MEDICINE CLINIC | Age: 61
End: 2021-10-11

## 2021-10-13 ENCOUNTER — HOSPITAL ENCOUNTER (OUTPATIENT)
Dept: LAB | Age: 61
Discharge: HOME | End: 2021-10-13
Payer: MEDICARE

## 2021-10-13 ENCOUNTER — OFFICE VISIT (OUTPATIENT)
Dept: FAMILY MEDICINE CLINIC | Age: 61
End: 2021-10-13
Payer: MEDICARE

## 2021-10-13 VITALS
WEIGHT: 176.6 LBS | SYSTOLIC BLOOD PRESSURE: 110 MMHG | HEIGHT: 60 IN | DIASTOLIC BLOOD PRESSURE: 82 MMHG | RESPIRATION RATE: 16 BRPM | HEART RATE: 97 BPM | BODY MASS INDEX: 34.67 KG/M2 | OXYGEN SATURATION: 97 % | TEMPERATURE: 97.6 F

## 2021-10-13 DIAGNOSIS — D50.9 IRON DEFICIENCY ANEMIA, UNSPECIFIED IRON DEFICIENCY ANEMIA TYPE: ICD-10-CM

## 2021-10-13 DIAGNOSIS — M54.12 CERVICAL RADICULOPATHY: Primary | ICD-10-CM

## 2021-10-13 DIAGNOSIS — R25.3 FLUTTERING MUSCLES: ICD-10-CM

## 2021-10-13 DIAGNOSIS — M32.9 LUPUS (HCC): ICD-10-CM

## 2021-10-13 LAB
ABSOLUTE EOS #: 0.16 K/UL (ref 0–0.44)
ABSOLUTE IMMATURE GRANULOCYTE: <0.03 K/UL (ref 0–0.3)
ABSOLUTE LYMPH #: 1.8 K/UL (ref 1.1–3.7)
ABSOLUTE MONO #: 0.59 K/UL (ref 0.1–1.2)
ALBUMIN SERPL-MCNC: 4.3 G/DL (ref 3.5–5.2)
ALBUMIN/GLOBULIN RATIO: 1.3 (ref 1–2.5)
ALP BLD-CCNC: 82 U/L (ref 35–104)
ALT SERPL-CCNC: 12 U/L (ref 5–33)
ANION GAP SERPL CALCULATED.3IONS-SCNC: 14 MMOL/L (ref 9–17)
AST SERPL-CCNC: 16 U/L
BASOPHILS # BLD: 0 % (ref 0–2)
BASOPHILS ABSOLUTE: <0.03 K/UL (ref 0–0.2)
BILIRUB SERPL-MCNC: 0.17 MG/DL (ref 0.3–1.2)
BUN BLDV-MCNC: 11 MG/DL (ref 8–23)
BUN/CREAT BLD: 15 (ref 9–20)
CALCIUM SERPL-MCNC: 9.8 MG/DL (ref 8.6–10.4)
CHLORIDE BLD-SCNC: 101 MMOL/L (ref 98–107)
CO2: 22 MMOL/L (ref 20–31)
CREAT SERPL-MCNC: 0.73 MG/DL (ref 0.5–0.9)
DIFFERENTIAL TYPE: ABNORMAL
EOSINOPHILS RELATIVE PERCENT: 2 % (ref 1–4)
FOLATE: 9.6 NG/ML
GFR AFRICAN AMERICAN: >60 ML/MIN
GFR NON-AFRICAN AMERICAN: >60 ML/MIN
GFR SERPL CREATININE-BSD FRML MDRD: ABNORMAL ML/MIN/{1.73_M2}
GFR SERPL CREATININE-BSD FRML MDRD: ABNORMAL ML/MIN/{1.73_M2}
GLUCOSE BLD-MCNC: 118 MG/DL (ref 70–99)
HCT VFR BLD CALC: 34.1 % (ref 36.3–47.1)
HEMOGLOBIN: 10.4 G/DL (ref 11.9–15.1)
IMMATURE GRANULOCYTES: 0 %
LYMPHOCYTES # BLD: 25 % (ref 24–43)
MCH RBC QN AUTO: 25.4 PG (ref 25.2–33.5)
MCHC RBC AUTO-ENTMCNC: 30.5 G/DL (ref 25.2–33.5)
MCV RBC AUTO: 83.2 FL (ref 82.6–102.9)
MONOCYTES # BLD: 8 % (ref 3–12)
NRBC AUTOMATED: 0 PER 100 WBC
PDW BLD-RTO: 15.9 % (ref 11.8–14.4)
PLATELET # BLD: 382 K/UL (ref 138–453)
PLATELET ESTIMATE: ABNORMAL
PMV BLD AUTO: 9.7 FL (ref 8.1–13.5)
POTASSIUM SERPL-SCNC: 3.8 MMOL/L (ref 3.7–5.3)
RBC # BLD: 4.1 M/UL (ref 3.95–5.11)
RBC # BLD: ABNORMAL 10*6/UL
SEG NEUTROPHILS: 65 % (ref 36–65)
SEGMENTED NEUTROPHILS ABSOLUTE COUNT: 4.66 K/UL (ref 1.5–8.1)
SODIUM BLD-SCNC: 137 MMOL/L (ref 135–144)
TOTAL PROTEIN: 7.7 G/DL (ref 6.4–8.3)
TSH SERPL DL<=0.05 MIU/L-ACNC: 2.71 MIU/L (ref 0.3–5)
VITAMIN B-12: 307 PG/ML (ref 232–1245)
WBC # BLD: 7.3 K/UL (ref 3.5–11.3)
WBC # BLD: ABNORMAL 10*3/UL

## 2021-10-13 PROCEDURE — 84443 ASSAY THYROID STIM HORMONE: CPT

## 2021-10-13 PROCEDURE — 99214 OFFICE O/P EST MOD 30 MIN: CPT | Performed by: NURSE PRACTITIONER

## 2021-10-13 PROCEDURE — 80053 COMPREHEN METABOLIC PANEL: CPT

## 2021-10-13 PROCEDURE — 36415 COLL VENOUS BLD VENIPUNCTURE: CPT

## 2021-10-13 PROCEDURE — 85025 COMPLETE CBC W/AUTO DIFF WBC: CPT

## 2021-10-13 PROCEDURE — 82746 ASSAY OF FOLIC ACID SERUM: CPT

## 2021-10-13 PROCEDURE — 99213 OFFICE O/P EST LOW 20 MIN: CPT | Performed by: NURSE PRACTITIONER

## 2021-10-13 PROCEDURE — 82607 VITAMIN B-12: CPT

## 2021-10-13 RX ORDER — CYCLOBENZAPRINE HCL 10 MG
10 TABLET ORAL 3 TIMES DAILY PRN
Qty: 90 TABLET | Refills: 1 | Status: SHIPPED | OUTPATIENT
Start: 2021-10-13 | End: 2022-10-27

## 2021-10-13 RX ORDER — OXYCODONE HYDROCHLORIDE AND ACETAMINOPHEN 5; 325 MG/1; MG/1
1 TABLET ORAL EVERY 6 HOURS PRN
Qty: 12 TABLET | Refills: 0 | Status: SHIPPED | OUTPATIENT
Start: 2021-10-13 | End: 2021-10-16

## 2021-10-13 ASSESSMENT — ENCOUNTER SYMPTOMS
BACK PAIN: 1
COLOR CHANGE: 0
ABDOMINAL PAIN: 0

## 2021-10-13 NOTE — PROGRESS NOTES
Subjective:      Patient ID: Stefania Meza is a 59 y.o. female coming in for   Chief Complaint   Patient presents with    New Patient     new to establish    Hand Numbness     numbness on left side. left leg numbness and pain. shoulder pain. has muscle spasms, sometimes for a long period of time. tingling. states that he thumb and index finger feel normal. this issue started a couple weeks ago.  Other     states that she had a MRI a week ago, states that is says that she has arthritis.  Referral - General     was seen in  recently by [facility] and Dr. Narayan Nuñez. has had xrays and MRI done. does have referrals to PT and pain management        Chronic cervical radiculopathy symptoms. Pain/numbness to left arm/hand. Reports drops things frequently. Was given IM steroid shot a few weeks ago in urgent care reported no significant relief with this. Pt did under MRI of cervical spine 10/4/21:  C2-C3: There is no significant disc protrusion, spinal canal stenosis or  neural foraminal narrowing.   C3-C4: Posterior disc osteophyte complex with mild spinal canal stenosis. Uncovertebral facet DJD with severe left neural foraminal narrowing.   C4-C5: Posterior disc osteophyte complex without significant spinal canal  stenosis.  Uncovertebral facet DJD with mild right neural foraminal narrowing.   C5-C6: Posterior disc osteophyte complex without significant spinal canal  stenosis.  Uncovertebral facet DJD with minimal left neural foraminal  narrowing.   C6-C7: There is no significant disc protrusion, spinal canal stenosis or  neural foraminal narrowing.   C7-T1: There is no significant disc protrusion, spinal canal stenosis or  neural foraminal narrowing. Pt does not want to pursue PT at this time. Has seen pain management in the past, does not want to approach this at today's visit. Does have history of lupus and fibromyalgia. Review of Systems   Gastrointestinal: Negative for abdominal pain.    Musculoskeletal: Positive for arthralgias, back pain and neck pain. Skin: Negative for color change. Neurological: Positive for weakness and numbness. No loss of bladder/bowel control, no saddle paresthesia   Hematological:        History of anemia    All other systems reviewed and are negative.      Recent Results (from the past 2688 hour(s))   Basic Metabolic Panel    Collection Time: 07/04/21  5:47 PM   Result Value Ref Range    Glucose 84 70 - 99 mg/dL    BUN 10 8 - 23 mg/dL    CREATININE 0.66 0.50 - 0.90 mg/dL    Bun/Cre Ratio 15 9 - 20    Calcium 9.0 8.6 - 10.4 mg/dL    Sodium 138 135 - 144 mmol/L    Potassium 3.5 (L) 3.7 - 5.3 mmol/L    Chloride 104 98 - 107 mmol/L    CO2 24 20 - 31 mmol/L    Anion Gap 10 9 - 17 mmol/L    GFR Non-African American >60 >60 mL/min    GFR African American >60 >60 mL/min    GFR Comment          GFR Staging NOT REPORTED    CBC Auto Differential    Collection Time: 07/04/21  5:47 PM   Result Value Ref Range    WBC 9.1 3.5 - 11.3 k/uL    RBC 4.01 3.95 - 5.11 m/uL    Hemoglobin 10.0 (L) 11.9 - 15.1 g/dL    Hematocrit 32.4 (L) 36.3 - 47.1 %    MCV 80.8 (L) 82.6 - 102.9 fL    MCH 24.9 (L) 25.2 - 33.5 pg    MCHC 30.9 25.2 - 33.5 g/dL    RDW 14.9 (H) 11.8 - 14.4 %    Platelets 417 838 - 127 k/uL    MPV 10.1 8.1 - 13.5 fL    NRBC Automated 0.0 0.0 per 100 WBC    Differential Type NOT REPORTED     Seg Neutrophils 60 36 - 65 %    Lymphocytes 27 24 - 43 %    Monocytes 9 3 - 12 %    Eosinophils % 4 1 - 4 %    Basophils 0 0 - 2 %    Immature Granulocytes 0 0 %    Segs Absolute 5.47 1.50 - 8.10 k/uL    Absolute Lymph # 2.42 1.10 - 3.70 k/uL    Absolute Mono # 0.80 0.10 - 1.20 k/uL    Absolute Eos # 0.37 0.00 - 0.44 k/uL    Basophils Absolute <0.03 0.00 - 0.20 k/uL    Absolute Immature Granulocyte 0.03 0.00 - 0.30 k/uL    WBC Morphology NOT REPORTED     RBC Morphology ANISOCYTOSIS PRESENT     Platelet Estimate NOT REPORTED    Vitamin D 25 Hydroxy    Collection Time: 07/08/21  9:55 AM   Result Value Ref Range    Vit D, 25-Hydroxy 24.7 (L) 30.0 - 100.0 ng/mL   Ferritin    Collection Time: 07/08/21  9:55 AM   Result Value Ref Range    Ferritin 13 13 - 150 ug/L   Iron And TIBC    Collection Time: 07/08/21  9:55 AM   Result Value Ref Range    Iron 26 (L) 37 - 145 ug/dL    TIBC 420 250 - 450 ug/dL    Iron Saturation 6 (L) 20 - 55 %    UIBC 394 (H) 112 - 347 ug/dL   Comprehensive Metabolic Panel    Collection Time: 07/27/21 12:40 PM   Result Value Ref Range    Glucose 109 (H) 70 - 99 mg/dL    BUN 10 8 - 23 mg/dL    CREATININE 0.65 0.50 - 0.90 mg/dL    Bun/Cre Ratio 15 9 - 20    Calcium 9.6 8.6 - 10.4 mg/dL    Sodium 137 135 - 144 mmol/L    Potassium 4.2 3.7 - 5.3 mmol/L    Chloride 100 98 - 107 mmol/L    CO2 22 20 - 31 mmol/L    Anion Gap 15 9 - 17 mmol/L    Alkaline Phosphatase 85 35 - 104 U/L    ALT 12 5 - 33 U/L    AST 21 <32 U/L    Total Bilirubin 0.25 (L) 0.3 - 1.2 mg/dL    Total Protein 8.5 (H) 6.4 - 8.3 g/dL    Albumin 4.5 3.5 - 5.2 g/dL    Albumin/Globulin Ratio 1.1 1.0 - 2.5    GFR Non-African American >60 >60 mL/min    GFR African American >60 >60 mL/min    GFR Comment          GFR Staging NOT REPORTED    CBC Auto Differential    Collection Time: 07/27/21 12:40 PM   Result Value Ref Range    WBC 12.9 (H) 3.5 - 11.3 k/uL    RBC 4.70 3.95 - 5.11 m/uL    Hemoglobin 11.5 (L) 11.9 - 15.1 g/dL    Hematocrit 38.0 36.3 - 47.1 %    MCV 80.9 (L) 82.6 - 102.9 fL    MCH 24.5 (L) 25.2 - 33.5 pg    MCHC 30.3 25.2 - 33.5 g/dL    RDW 15.2 (H) 11.8 - 14.4 %    Platelets 366 489 - 777 k/uL    MPV 10.0 8.1 - 13.5 fL    NRBC Automated 0.0 0.0 per 100 WBC    Differential Type NOT REPORTED     Seg Neutrophils 79 (H) 36 - 65 %    Lymphocytes 14 (L) 24 - 43 %    Monocytes 6 3 - 12 %    Eosinophils % 1 1 - 4 %    Basophils 0 0 - 2 %    Immature Granulocytes 0 0 %    Segs Absolute 10.12 (H) 1.50 - 8.10 k/uL    Absolute Lymph # 1.80 1.10 - 3.70 k/uL    Absolute Mono # 0.80 0.10 - 1.20 k/uL    Absolute Eos # 0.12 0.00 - 0.44 k/uL Basophils Absolute 0.03 0.00 - 0.20 k/uL    Absolute Immature Granulocyte 0.04 0.00 - 0.30 k/uL    WBC Morphology NOT REPORTED     RBC Morphology ANISOCYTOSIS PRESENT     Platelet Estimate NOT REPORTED    Lipase    Collection Time: 07/27/21 12:40 PM   Result Value Ref Range    Lipase 20 13 - 60 U/L   Amylase    Collection Time: 07/27/21 12:40 PM   Result Value Ref Range    Amylase 75 28 - 100 U/L   TSH without Reflex    Collection Time: 07/27/21 12:40 PM   Result Value Ref Range    TSH 1.45 0.30 - 5.00 mIU/L   T4, FREE    Collection Time: 07/27/21 12:40 PM   Result Value Ref Range    Thyroxine, Free 1.11 0.93 - 1.70 ng/dL   Urinalysis    Collection Time: 07/27/21 12:45 PM   Result Value Ref Range    Color, UA NOT REPORTED YELLOW    Turbidity UA NOT REPORTED CLEAR    Glucose, Ur NEGATIVE NEGATIVE    Bilirubin Urine NEGATIVE NEGATIVE    Ketones, Urine NEGATIVE NEGATIVE    Specific [place], UA 1.010 1.010 - 1.025    Urine Hgb 1+ (A) NEGATIVE    pH, UA 6.0 5.0 - 6.0    Protein, UA TRACE (A) NEGATIVE    Urobilinogen, Urine Normal Normal    Nitrite, Urine NEGATIVE NEGATIVE    Leukocyte Esterase, Urine NEGATIVE NEGATIVE    Urinalysis Comments NOT REPORTED    Microscopic Urinalysis    Collection Time: 07/27/21 12:45 PM   Result Value Ref Range    -          WBC, UA 0 TO 4 0 - 4 /HPF    RBC, UA 0 TO 4 0 - 4 /HPF    Casts UA NOT REPORTED 0 - 2 /LPF    Crystals, UA NOT REPORTED None /HPF    Epithelial Cells UA 0 TO 4 0 - 5 /HPF    Renal Epithelial, UA NOT REPORTED 0 /HPF    Bacteria, UA TRACE (A) None    Mucus, UA NOT REPORTED None    Trichomonas, UA NOT REPORTED None    Amorphous, UA NOT REPORTED None    Other Observations UA NOT REPORTED NOT REQ.     Yeast, UA NOT REPORTED None   Lactic Acid    Collection Time: 07/27/21 12:59 PM   Result Value Ref Range    Lactic Acid 1.3 0.5 - 2.2 mmol/L   COVID-19, Rapid    Collection Time: 07/27/21  2:43 PM    Specimen: Nasopharyngeal Swab   Result Value Ref Range    Specimen Description Aimee Bloodgood NASOPHARYNGEAL SWAB     SARS-CoV-2, Rapid Not Detected Not Detected   C DIFF TOXIN/ANTIGEN    Collection Time: 07/27/21  5:55 PM    Specimen: Stool   Result Value Ref Range    Specimen Description . FECES     C DIFF AG + TOXIN NEGATIVE NEGATIVE   Basic Metabolic Panel w/ Reflex to MG    Collection Time: 07/28/21  6:08 AM   Result Value Ref Range    Glucose 104 (H) 70 - 99 mg/dL    BUN 7 (L) 8 - 23 mg/dL    CREATININE 0.75 0.50 - 0.90 mg/dL    Bun/Cre Ratio 9 9 - 20    Calcium 8.5 (L) 8.6 - 10.4 mg/dL    Sodium 139 135 - 144 mmol/L    Potassium 4.1 3.7 - 5.3 mmol/L    Chloride 105 98 - 107 mmol/L    CO2 25 20 - 31 mmol/L    Anion Gap 9 9 - 17 mmol/L    GFR Non-African American >60 >60 mL/min    GFR African American >60 >60 mL/min    GFR Comment          GFR Staging NOT REPORTED    CBC auto differential    Collection Time: 07/28/21  6:08 AM   Result Value Ref Range    WBC 6.7 3.5 - 11.3 k/uL    RBC 3.92 (L) 3.95 - 5.11 m/uL    Hemoglobin 9.7 (L) 11.9 - 15.1 g/dL    Hematocrit 32.2 (L) 36.3 - 47.1 %    MCV 82.1 (L) 82.6 - 102.9 fL    MCH 24.7 (L) 25.2 - 33.5 pg    MCHC 30.1 25.2 - 33.5 g/dL    RDW 15.3 (H) 11.8 - 14.4 %    Platelets 522 508 - 780 k/uL    MPV 9.8 8.1 - 13.5 fL    NRBC Automated 0.0 0.0 per 100 WBC    Differential Type NOT REPORTED     WBC Morphology NOT REPORTED     RBC Morphology ANISOCYTOSIS PRESENT     Platelet Estimate NOT REPORTED     Seg Neutrophils 58 36 - 65 %    Lymphocytes 28 24 - 43 %    Monocytes 11 3 - 12 %    Eosinophils % 3 1 - 4 %    Basophils 0 0 - 2 %    Immature Granulocytes 0 0 %    Segs Absolute 3.87 1.50 - 8.10 k/uL    Absolute Lymph # 1.86 1.10 - 3.70 k/uL    Absolute Mono # 0.72 0.10 - 1.20 k/uL    Absolute Eos # 0.23 0.00 - 0.44 k/uL    Basophils Absolute 0.03 0.00 - 0.20 k/uL    Absolute Immature Granulocyte <0.03 0.00 - 0.30 k/uL   EKG 12 Lead    Collection Time: 07/28/21  3:15 PM   Result Value Ref Range    Ventricular Rate 81 BPM    Atrial Rate 81 BPM    P-R Interval 132 ms 07/29/21 11:14 AM   Result Value Ref Range    Surgical Pathology Report       -- Diagnosis --  1. ILEUM, BIOPSIES:  - NORMAL ILEAL MUCOSA. 2.  RIGHT COLON, BIOPSIES:  - NORMAL COLONIC MUCOSA. 3.  PROXIMAL TRANSVERSE COLON, BIOPSIES:  - NORMAL COLONIC MUCOSA. 4.  DISTAL TRANSVERSE COLON, BIOPSIES:  - NORMAL COLONIC MUCOSA. 5.  COLON, SPLENIC FLEXURE, BIOPSIES:  - NORMAL COLONIC MUCOSA. 6.  DESCENDING COLON, BIOPSIES:  - NORMAL COLONIC MUCOSA. 7.  PROXIMAL SIGMOID COLON, BIOPSIES:  - SUGGESTION OF MILD LAMINA PROPRIA FIBROSIS IN ONE BIOPSY. - OTHERWISE UNREMARKABLE COLONIC MUCOSA. - SEE MICROSCOPIC DESCRIPTION. 8.  DISTAL SIGMOID COLON, BIOPSIES:  - NORMAL COLONIC MUCOSA. 9.  RECTUM, BIOPSIES:  - NORMAL RECTAL MUCOSA. Rebeka Quintana. [facility],  **Electronically Signed Out**         sls/8/2/2021       Clinical Information  Pre-op Diagnosis:  DIVERTICULITIS   Operative Findings:  ILEUM BX; RIGHT COLON BX; PROXIMAL TRANSVERSE BX;  DISTAL TRANSVERSE BX; SPLENIC FLEXURE BX; DESCENDING COLON BX;  PROXIMAL SIGMOID BX; DISTAL SIGMOID BX; RECTAL  BX  Operation Performed:  COLONOSCOPY     Source of Specimen  1: ILEUM BX  2: RIGHT COLON BX  3: PROXIMAL TRANSVERSE BX  4: DISTAL TRANSVERSE BX  5: SPLENIC FLEXURE BX  6: DESCENDING COLON BX  7: PROXIMAL SIGMOID BX  8: DISTAL SIGMOID BX  9: RECTAL BX    Gross Description  1. \"ES CARIAS LONG, ILEUM BX\" Three tan-white tissue fragments from  0.2 to 0.3 cm and are 0.5 x 0.4 x 0.2 cm in aggregate. Entirely 1cs. 2. \"ES CARIAS LONG, RIGHT COLON BX\" Two tan-white tissue fragments  from 0.3 to 0.4 cm and are 0.4 x 0.4 x 0.2 cm in aggregate. Entirely  1cs. 3. \"ES CARIAS LONG, PROXIMAL TRANSVERSE BX\" Three tan-white tissue  fragments from 0.3 to 0.4 cm and are 0.7 x 0.4 x 0.2 cm in aggregate. Entirely 1cs. 4. \"ES CARIAS LONG, DISTAL TRANSVERSE BX\" Three tan-white tissue  fragments from 0.3 to 0.4 cm and are 0.7 x 0.4 x 0.2 cm in aggregate. Entirely 1cs. 5. \"ES CARIAS LONG, SPLENIC FLEXURE BX\" Four tan-white tissue  fragments from 0.2 to 0.3 cm and are 0.5 x 0.4 x 0.2 cm  in aggregate. Entirely 1cs. 6. \"ES CARIAS LONG, DESCENDING COLON BX\" Four caldwell-white tissue  fragments from 0.2 to 0.3 cm and are 0.5 x 0.4 x 0.2 cm in aggregate. Entirely 1cs. 7. \"ES CARIAS LONG, PROXIMAL SIGMOID BX\" Four tan-white tissue  fragments from 0.2 to 0.3 cm and are 0.5 x 0.4 x 0.2 cm in aggregate. Entirely 1cs. 8. \"ES CARIAS LONG, DISTAL SIGMOID BX\" Three tan-white tissue  fragments from 0.3 to 0.4 cm and are 0.7 x 0.4 x 0.2 cm in aggregate. Entirely 1cs. 9. \"ES CARIAS LONG, RECTAL BX\" Five tan-white tissue fragments from  0.1 to 0.3 cm and are 0.5 x 0.4 x 0.2 cm in aggregate. Entirely 1cs. mpb tm       Microscopic Description  1-9. In one of the proximal sigmoid colon biopsies, there is a subtle  suggestion of some mild lamina propria fibrosis. This could represent  a small area of resolved prior ischemic injury but may also be  essentially normal colonic mucosa in the patient with diverticular  disease in this area. The remaining biopsies are  histologically  unremarkable. There is no evidence of active inflammation,  granulomatous inflammation, ulcer, dysplasia or malignancy. SURGICAL PATHOLOGY CONSULTATION       Patient Name: Magui Serna  [facility] Rec: [id number]  Path Number: [id number]    [address] [place] [address].   [facility], [address]  [phone]  Fax: [phone]     CBC Auto Differential    Collection Time: 07/30/21  5:22 AM   Result Value Ref Range    WBC 6.6 3.5 - 11.3 k/uL    RBC 3.74 (L) 3.95 - 5.11 m/uL    Hemoglobin 9.3 (L) 11.9 - 15.1 g/dL    Hematocrit 30.2 (L) 36.3 - 47.1 %    MCV 80.7 (L) 82.6 - 102.9 fL    MCH 24.9 (L) 25.2 - 33.5 pg    MCHC 30.8 25.2 - 33.5 g/dL    RDW 15.1 (H) 11.8 - 14.4 %    Platelets 337 556 - 902 k/uL    MPV 9.7 8.1 - 13.5 fL    NRBC Automated 0.0 0.0 per 100 WBC    Differential Type NOT REPORTED     Seg Neutrophils 60 36 - 65 %    Lymphocytes 25 24 - 43 %    Monocytes 11 3 - 12 %    Eosinophils % 4 1 - 4 %    Basophils 0 0 - 2 %    Immature Granulocytes 0 0 %    Segs Absolute 3.96 1.50 - 8.10 k/uL    Absolute Lymph # 1.65 1.10 - 3.70 k/uL    Absolute Mono # 0.71 0.10 - 1.20 k/uL    Absolute Eos # 0.26 0.00 - 0.44 k/uL    Basophils Absolute <0.03 0.00 - 0.20 k/uL    Absolute Immature Granulocyte <0.03 0.00 - 0.30 k/uL    WBC Morphology NOT REPORTED     RBC Morphology ANISOCYTOSIS PRESENT     Platelet Estimate NOT REPORTED    Basic Metabolic Panel    Collection Time: 07/30/21  5:22 AM   Result Value Ref Range    Glucose 89 70 - 99 mg/dL    BUN 6 (L) 8 - 23 mg/dL    CREATININE 0.72 0.50 - 0.90 mg/dL    Bun/Cre Ratio 8 (L) 9 - 20    Calcium 8.5 (L) 8.6 - 10.4 mg/dL    Sodium 138 135 - 144 mmol/L    Potassium 3.8 3.7 - 5.3 mmol/L    Chloride 106 98 - 107 mmol/L    CO2 24 20 - 31 mmol/L    Anion Gap 8 (L) 9 - 17 mmol/L    GFR Non-African American >60 >60 mL/min    GFR African American >60 >60 mL/min    GFR Comment          GFR Staging NOT REPORTED    COVID-19    Collection Time: 08/13/21  9:45 AM    Specimen: Nasopharyngeal Swab   Result Value Ref Range    SARS-CoV-2          Source . NASOPHARYNGEAL SWAB     SARS-CoV-2 Not Detected Not Detected   Surgical Pathology    Collection Time: 08/18/21 12:00 AM   Result Value Ref Range    Surgical Pathology Report       -- Diagnosis --  1. STOMACH, BIOPSY (ANTRUM):       -FOCAL MILD ACTIVE CHRONIC GASTRITIS. 2.  STOMACH, BIOPSY (BODY):       -MILD TO MODERATE ACTIVE CHRONIC GASTRITIS. 3.  ESOPHAGUS, BIOPSY (SQUAMOUS AND GLANDULAR MUCOSA):       -MILD CHRONIC INFLAMMATION.       Gris Warren M.D.  **Electronically Signed Out**         [facility]/8/23/2021       Clinical Information  Pre-op Diagnosis:  GERD, RIGHT EPIGASTRIC PAIN   Operative Findings:  ANTRUM BX; BODY BIOPSY; DISTAL ESOPHAGUS BX  Operation Performed:  EGD BIOPSY     Source of Specimen  1: ANTRUM BX  2: BODY BX  3: DISTAL ESOPHAGUS BX    Gross Description  1. \"ES CARIAS LONG, ANTRUM BX\" Three tan-white tissue fragments from  0.3 to 0.5 cm and are 1.3 x 0.5 x 0.1 cm in aggregate. Entirely 1cs. 2. \"ES CARIAS LONG, BODY BIOPSY\" Five tan-white tissue fragments  from 0.2 to 0.3 cm and are 1.3 x 0.7 x 0.1 cm in aggregate. Entirely  1cs. 3. \"ES CARIAS LONG, DISTAL ESOPHAGUS BX\" Seven tan-white tissue  fragments from 0.1 to 0.3 cm an d are 1.5 x 0.9 x 0.1 cm in aggregate. Entirely 1cs. jg tm      Microscopic Description  1-2. Gastric tissues negative for ulcer, intestinal metaplasia,  dysplasia and neoplasm. With inflammation present and no definite  micro-organisms seen by H&E stain, immunohistochemical stain  (control is reactive) is performed for detection of Helicobacter and  the result is negative. 3.  With examination at two levels of section, the biopsy includes  squamous and glandular (gastric-type) mucosa. No ulcer,  intra-epithelial eosinophils, intestinal metaplasia (Lala's),  dysplasia, or neoplasm is present. SURGICAL PATHOLOGY CONSULTATION       Patient Name: Ervin Lion  Med Rec: [id number]  Path Number: [id number]    [address] [place] [address].   [facility], [address]  [phone]  Fax: [phone]     TSH With Reflex Ft4    Collection Time: 10/13/21 10:40 AM   Result Value Ref Range    TSH 2.71 0.30 - 5.00 mIU/L   Comprehensive Metabolic Panel    Collection Time: 10/13/21 10:40 AM   Result Value Ref Range    Glucose 118 (H) 70 - 99 mg/dL    BUN 11 8 - 23 mg/dL    CREATININE 0.73 0.50 - 0.90 mg/dL    Bun/Cre Ratio 15 9 - 20    Calcium 9.8 8.6 - 10.4 mg/dL    Sodium 137 135 - 144 mmol/L    Potassium 3.8 3.7 - 5.3 mmol/L    Chloride 101 98 - 107 mmol/L    CO2 22 20 - 31 mmol/L    Anion Gap 14 9 - 17 mmol/L    Alkaline Phosphatase 82 35 - 104 U/L    ALT 12 5 - 33 U/L    AST 16 <32 U/L    Total Bilirubin 0.17 (L) 0.3 - 1.2 mg/dL    Total Protein 7.7 6.4 - 8.3 g/dL    Albumin 4.3 3.5 - 5.2 g/dL    Albumin/Globulin Ratio 1.3 1.0 - 2.5    GFR Non-African American >60 >60 mL/min    GFR African American >60 >60 mL/min    GFR Comment          GFR Staging NOT REPORTED    CBC With Auto Differential    Collection Time: 10/13/21 10:40 AM   Result Value Ref Range    WBC 7.3 3.5 - 11.3 k/uL    RBC 4.10 3.95 - 5.11 m/uL    Hemoglobin 10.4 (L) 11.9 - 15.1 g/dL    Hematocrit 34.1 (L) 36.3 - 47.1 %    MCV 83.2 82.6 - 102.9 fL    MCH 25.4 25.2 - 33.5 pg    MCHC 30.5 25.2 - 33.5 g/dL    RDW 15.9 (H) 11.8 - 14.4 %    Platelets 170 260 - 300 k/uL    MPV 9.7 8.1 - 13.5 fL    NRBC Automated 0.0 0.0 per 100 WBC    Differential Type NOT REPORTED     Seg Neutrophils 65 36 - 65 %    Lymphocytes 25 24 - 43 %    Monocytes 8 3 - 12 %    Eosinophils % 2 1 - 4 %    Basophils 0 0 - 2 %    Immature Granulocytes 0 0 %    Segs Absolute 4.66 1.50 - 8.10 k/uL    Absolute Lymph # 1.80 1.10 - 3.70 k/uL    Absolute Mono # 0.59 0.10 - 1.20 k/uL    Absolute Eos # 0.16 0.00 - 0.44 k/uL    Basophils Absolute <0.03 0.00 - 0.20 k/uL    Absolute Immature Granulocyte <0.03 0.00 - 0.30 k/uL    WBC Morphology NOT REPORTED     RBC Morphology ANISOCYTOSIS PRESENT     Platelet Estimate NOT REPORTED          Objective:  /82   Pulse 97   Temp 97.6 °F (36.4 °C)   Resp 16   Ht 5' (1.524 m)   Wt 176 lb 9.6 oz (80.1 kg)   SpO2 97%   BMI 34.49 kg/m²      Physical Exam  Vitals reviewed. Constitutional:       General: She is not in acute distress. Appearance: Normal appearance. Cardiovascular:      Rate and Rhythm: Normal rate and regular rhythm. Pulses: Normal pulses. Heart sounds: Normal heart sounds. Pulmonary:      Effort: Pulmonary effort is normal.      Breath sounds: Normal breath sounds. Musculoskeletal:      Cervical back: Decreased range of motion. Comments: Left hand grasp 4/5, right 5/5   Skin:     General: Skin is warm and dry. Capillary Refill: Capillary refill takes less than 2 seconds. Neurological:      General: No focal deficit present. Mental Status: She is alert and oriented to person, place, and time. Sensory: No sensory deficit. Assessment:      1. Cervical radiculopathy    2. Lupus ([address]    3. Iron deficiency anemia, unspecified iron deficiency anemia type    4. Fluttering muscles           Plan:    repeat labs. Discussed pain relief. I am only giving 3 day supply of percocet, and I will not provide any more than that. Referrals made to neurosurg and rheumatology.  Pt did not want to pursue PT /pain management, wants to speak with neurosurg first.     Orders Placed This Encounter   Procedures    CBC With Auto Differential     Standing Status:   Future     Number of Occurrences:   1     Standing Expiration Date:   10/13/2022    Comprehensive Metabolic Panel     Standing Status:   Future     Number of Occurrences:   1     Standing Expiration Date:   10/13/2022    Vitamin B12 & Folate     Standing Status:   Future     Number of Occurrences:   1     Standing Expiration Date:   10/13/2022    TSH With Reflex Ft4     Standing Status:   Future     Number of Occurrences:   1     Standing Expiration Date:   10/13/2022   [facility] Remedios Garcia DO, Neurosurgery, [place]     Referral Priority:   Routine     Referral Type:   Eval and Treat     Referral Reason:   Specialty Services Required     Referred to Provider:   Gregg Villeda DO     Requested Specialty:   Neurosurgery     Number of Visits Requested:   [facility] Claudetta Johnson, MD, Rheumatology, [facility]     Referral Priority:   Routine     Referral Type:   Eval and Treat     Referral Reason:   Specialty Services Required     Referred to Provider:   Agapito Goodell, MD     Requested Specialty:   Rheumatology     Number of Visits Requested:   1      Outpatient Encounter Medications as of 10/13/2021   Medication Sig Dispense Refill    cyclobenzaprine (FLEXERIL) 10 MG tablet Take 1 tablet by mouth 3 times daily as needed for Muscle spasms 90 tablet 1    oxyCODONE-acetaminophen (PERCOCET) 5-325 MG per tablet Take 1 tablet by mouth every 6 hours as needed for Pain for up to 3 days. Intended supply: 3 days. Take lowest dose possible to manage pain 12 tablet 0    UNABLE TO FIND Handicap placard. Orthopedic limitations. Expires 10/13/2026 1 each 0    omeprazole (PRILOSEC) 20 MG delayed release capsule Take 1 capsule by mouth every morning (before breakfast) 30 capsule 0    amLODIPine (NORVASC) 10 MG tablet Take 1 tablet by mouth daily 30 tablet 0    vitamin D (ERGOCALCIFEROL) 1.25 MG (42045 UT) CAPS capsule Take 1 capsule by mouth once a week 12 capsule 0    dicyclomine (BENTYL) 10 MG capsule Take 1 capsule by mouth 4 times daily as needed (Cramps/Abdominal Pain) 120 capsule 2    MAGNESIUM PO Take by mouth daily       Handicap Placard [facility] by Does not apply route Issue parking placard for persons with disabilities. [facility] sec 4503.44. Expires in 5 years. Applicant meets the qualifying disability criteria.  1 each 0    diclofenac sodium 1 % GEL Apply 2 g topically 4 times daily 3 Tube 3    Multiple Vitamins-Minerals (MULTI-VITAMIN/MINERALS PO) Take 1 tablet by mouth 2 times daily      Cholecalciferol (VITAMIN D3) 14209 units CAPS Take 1 capsule by mouth daily 30 capsule 5    docusate sodium (COLACE) 100 MG capsule Take 1 capsule by mouth 2 times daily 60 capsule 5    ferrous sulfate (FE TABS 325) 325 (65 Fe) MG EC tablet Take 1 tablet by mouth daily (with breakfast) (Patient not taking: Reported on 9/22/2021) 30 tablet 0    hyoscyamine (LEVBID) 375 MCG extended release tablet Take 1 tablet by mouth every 12 hours as needed for Cramping (Patient not taking: Reported on 9/22/2021) 60 tablet 3    [DISCONTINUED] cyclobenzaprine (FLEXERIL) 10 MG tablet Take 1 tablet by mouth 3 times daily as needed for Muscle spasms 90 tablet 11     No facility-administered encounter medications on file as of 10/13/2021.             Shana Duffy, APRN - CNP

## 2022-01-11 ENCOUNTER — OFFICE VISIT (OUTPATIENT)
Dept: PRIMARY CARE CLINIC | Age: 62
End: 2022-01-11
Payer: MEDICARE

## 2022-01-11 VITALS
TEMPERATURE: 97.8 F | BODY MASS INDEX: 33.77 KG/M2 | SYSTOLIC BLOOD PRESSURE: 130 MMHG | OXYGEN SATURATION: 98 % | DIASTOLIC BLOOD PRESSURE: 80 MMHG | RESPIRATION RATE: 18 BRPM | HEIGHT: 60 IN | WEIGHT: 172 LBS | HEART RATE: 100 BPM

## 2022-01-11 DIAGNOSIS — M62.838 TRAPEZIUS MUSCLE SPASM: Primary | ICD-10-CM

## 2022-01-11 DIAGNOSIS — R49.0 HOARSENESS OF VOICE: ICD-10-CM

## 2022-01-11 DIAGNOSIS — M32.9 LUPUS (HCC): ICD-10-CM

## 2022-01-11 PROCEDURE — PBSHW PBB SHADOW CHARGE: Performed by: FAMILY MEDICINE

## 2022-01-11 PROCEDURE — G8417 CALC BMI ABV UP PARAM F/U: HCPCS | Performed by: FAMILY MEDICINE

## 2022-01-11 PROCEDURE — 1036F TOBACCO NON-USER: CPT | Performed by: FAMILY MEDICINE

## 2022-01-11 PROCEDURE — G8427 DOCREV CUR MEDS BY ELIG CLIN: HCPCS | Performed by: FAMILY MEDICINE

## 2022-01-11 PROCEDURE — 20552 NJX 1/MLT TRIGGER POINT 1/2: CPT | Performed by: FAMILY MEDICINE

## 2022-01-11 PROCEDURE — 99213 OFFICE O/P EST LOW 20 MIN: CPT | Performed by: FAMILY MEDICINE

## 2022-01-11 PROCEDURE — 99212 OFFICE O/P EST SF 10 MIN: CPT | Performed by: FAMILY MEDICINE

## 2022-01-11 PROCEDURE — 3017F COLORECTAL CA SCREEN DOC REV: CPT | Performed by: FAMILY MEDICINE

## 2022-01-11 PROCEDURE — G8484 FLU IMMUNIZE NO ADMIN: HCPCS | Performed by: FAMILY MEDICINE

## 2022-01-11 RX ORDER — TRIAMCINOLONE ACETONIDE 40 MG/ML
20 INJECTION, SUSPENSION INTRA-ARTICULAR; INTRAMUSCULAR ONCE
Status: COMPLETED | OUTPATIENT
Start: 2022-01-11 | End: 2022-01-11

## 2022-01-11 RX ORDER — PHENOL 1.4 %
1 AEROSOL, SPRAY (ML) MUCOUS MEMBRANE
Qty: 50 ML | Refills: 0 | Status: SHIPPED | OUTPATIENT
Start: 2022-01-11

## 2022-01-11 RX ADMIN — TRIAMCINOLONE ACETONIDE 20 MG: 200 INJECTION, SUSPENSION INTRA-ARTICULAR; INTRAMUSCULAR at 17:00

## 2022-01-11 ASSESSMENT — PATIENT HEALTH QUESTIONNAIRE - PHQ9
SUM OF ALL RESPONSES TO PHQ QUESTIONS 1-9: 0
7. TROUBLE CONCENTRATING ON THINGS, SUCH AS READING THE NEWSPAPER OR WATCHING TELEVISION: 0
SUM OF ALL RESPONSES TO PHQ QUESTIONS 1-9: 0
4. FEELING TIRED OR HAVING LITTLE ENERGY: 0
SUM OF ALL RESPONSES TO PHQ QUESTIONS 1-9: 0
6. FEELING BAD ABOUT YOURSELF - OR THAT YOU ARE A FAILURE OR HAVE LET YOURSELF OR YOUR FAMILY DOWN: 0
2. FEELING DOWN, DEPRESSED OR HOPELESS: 0
9. THOUGHTS THAT YOU WOULD BE BETTER OFF DEAD, OR OF HURTING YOURSELF: 0
SUM OF ALL RESPONSES TO PHQ QUESTIONS 1-9: 0
8. MOVING OR SPEAKING SO SLOWLY THAT OTHER PEOPLE COULD HAVE NOTICED. OR THE OPPOSITE, BEING SO FIGETY OR RESTLESS THAT YOU HAVE BEEN MOVING AROUND A LOT MORE THAN USUAL: 0
3. TROUBLE FALLING OR STAYING ASLEEP: 0
5. POOR APPETITE OR OVEREATING: 0
SUM OF ALL RESPONSES TO PHQ9 QUESTIONS 1 & 2: 0
SUM OF ALL RESPONSES TO PHQ QUESTIONS 1-9: 0
1. LITTLE INTEREST OR PLEASURE IN DOING THINGS: 0
SUM OF ALL RESPONSES TO PHQ QUESTIONS 1-9: 0
2. FEELING DOWN, DEPRESSED OR HOPELESS: 0

## 2022-01-11 ASSESSMENT — ENCOUNTER SYMPTOMS
SHORTNESS OF BREATH: 0
CHEST TIGHTNESS: 0
COUGH: 0
VOICE CHANGE: 1
BACK PAIN: 1
WHEEZING: 0
SORE THROAT: 1

## 2022-01-11 NOTE — PROGRESS NOTES
[facility] [address]  [address] [place] [address]  Dept: [phone]  Dept Fax: [phone]    Ryanne Weber is a 59 y.o. female who presents today for her medical conditions/complaints as noted below. Ryanne Weber is c/o of   Chief Complaint   Patient presents with    Shoulder Pain     back pain but the pain is getting worse over the last 3d       HPI:      Here today for shoulder pain, neck pain and a hoarse voice    Shoulder Pain   The pain is present in the left shoulder. This is a recurrent problem. The current episode started more than 1 year ago. There has been no history of extremity trauma. The problem occurs constantly. The problem has been gradually worsening. The quality of the pain is described as aching. The pain is at a severity of 4/10. The pain is moderate. Associated symptoms include joint locking, a limited range of motion and stiffness. Pertinent negatives include no fever, inability to bear weight, joint swelling, numbness or tingling. Associated symptoms comments: Some swelling in upper back. The symptoms are aggravated by lying down and activity. She has tried acetaminophen and heat (flexeril, voltaren gel) for the symptoms. The treatment provided mild relief. lupus       Hoarseness: new; she has had a vocal thing or 5 years that tends to make her hoarse and she recently has been struggling with it again. She has had a runny nose, sore throat etc over the past week. She suspects that she had covid.         Past Medical History:   Diagnosis Date    Anxiety     Asthma     Cerebral artery occlusion with cerebral infarction [facility]) 2009    Cerebrovascular disease     stroke 1998    Depression     Diverticulitis     Fibromyalgia     History of small bowel obstruction     Hypertension     Lupus ([facility] [address]     Seizures ([facility] [address] 2005 and 2006          Social History Tobacco Use    Smoking status: Former Smoker     Packs/day: 0.25     Years: 10.00     Pack years: 2.50     Quit date: 1993     Years since quittin.7    Smokeless tobacco: Never Used    Tobacco comment: robinsonaltmmarcellus rrt 3/12/17   Substance Use Topics    Alcohol use: Yes     Alcohol/week: 0.0 standard drinks     Comment: has about 1 or 2 a year. very rarely     Current Outpatient Medications   Medication Sig Dispense Refill    phenol (CHLORASEPTIC MOUTH PAIN) 1.4 % LIQD mouth spray Take 1 drop by mouth every 2 hours as needed for Sore Throat 50 mL 0    cyclobenzaprine (FLEXERIL) 10 MG tablet Take 1 tablet by mouth 3 times daily as needed for Muscle spasms 90 tablet 1    UNABLE TO FIND Handicap placard. Orthopedic limitations. Expires 10/13/2026 1 each 0    omeprazole (PRILOSEC) 20 MG delayed release capsule Take 1 capsule by mouth every morning (before breakfast) 30 capsule 0    amLODIPine (NORVASC) 10 MG tablet Take 1 tablet by mouth daily 30 tablet 0    ferrous sulfate (FE TABS 325) 325 (65 Fe) MG EC tablet Take 1 tablet by mouth daily (with breakfast) 30 tablet 0    vitamin D (ERGOCALCIFEROL) 1.25 MG (84968 UT) CAPS capsule Take 1 capsule by mouth once a week 12 capsule 0    dicyclomine (BENTYL) 10 MG capsule Take 1 capsule by mouth 4 times daily as needed (Cramps/Abdominal Pain) 120 capsule 2    hyoscyamine (LEVBID) 375 MCG extended release tablet Take 1 tablet by mouth every 12 hours as needed for Cramping 60 tablet 3    MAGNESIUM PO Take by mouth daily       Handicap Placard [facility] by Does not apply route Issue parking placard for persons with disabilities. [facility] sec 4503.44. Expires in 5 years. Applicant meets the qualifying disability criteria.  1 each 0    diclofenac sodium 1 % GEL Apply 2 g topically 4 times daily 3 Tube 3    Multiple Vitamins-Minerals (MULTI-VITAMIN/MINERALS PO) Take 1 tablet by mouth 2 times daily      Cholecalciferol (VITAMIN D3) 81652 units CAPS Take 1 capsule by mouth daily 30 capsule 5    docusate sodium (COLACE) 100 MG capsule Take 1 capsule by mouth 2 times daily 60 capsule 5     No current facility-administered medications for this visit. Allergies   Allergen Reactions    Latex Swelling     The powder in the gloves    Dilaudid [Hydromorphone Hcl] Nausea And Vomiting    Medrol [Methylprednisolone] Other (See Comments)     Back pain    Prednisone Nausea And Vomiting       Subjective:     Review of Systems   Constitutional: Negative for activity change, appetite change, chills, fatigue and fever. HENT: Positive for congestion (improving), sore throat and voice change. Eyes: Negative for visual disturbance. Respiratory: Negative for cough (improving; had it last week), chest tightness, shortness of breath and wheezing. Cardiovascular: Negative for chest pain, palpitations and leg swelling. Genitourinary: Negative for difficulty urinating. Musculoskeletal: Positive for arthralgias (left shoulder), back pain and stiffness. Negative for joint swelling. Skin: Negative for rash. Neurological: Negative for dizziness, tingling, syncope, weakness, light-headedness, numbness and headaches. Objective:      Physical Exam  Vitals and nursing note reviewed. Constitutional:       General: She is not in acute distress. Appearance: She is well-developed. HENT:      Mouth/Throat:      Comments: Her voice is barely a whisper and is very hoarse  Eyes:      Conjunctiva/sclera: Conjunctivae normal.   Neck:      Thyroid: No thyroid mass. Cardiovascular:      Rate and Rhythm: Normal rate and regular rhythm. Heart sounds: Normal heart sounds. No murmur heard. Pulmonary:      Effort: Pulmonary effort is normal. No respiratory distress. Breath sounds: Normal breath sounds. No wheezing or rales. Abdominal:      General: Bowel sounds are normal. There is no distension. Palpations: Abdomen is soft. Tenderness:  There is no abdominal tenderness. There is no guarding. Musculoskeletal:      Right shoulder: Normal.      Left shoulder: Tenderness present. No bony tenderness or crepitus. Decreased range of motion. Normal strength. Cervical back: Edema, spasms and tenderness present. Back:    Skin:     General: Skin is warm and dry. Findings: No rash. Neurological:      Mental Status: She is alert and oriented to person, place, and time. Sensory: No sensory deficit. Coordination: Coordination normal.      Gait: Gait normal.      Deep Tendon Reflexes:      Reflex Scores:       Patellar reflexes are 2+ on the right side and 2+ on the left side. Psychiatric:         Behavior: Behavior normal.         Judgment: Judgment normal.       /80   Pulse 100   Temp 97.8 °F (36.6 °C)   Resp 18   Ht 5' (1.524 m)   Wt 172 lb (78 kg)   SpO2 98%   BMI 33.59 kg/m²     Assessment:       Diagnosis Orders   1. Trapezius muscle spasm  IL INJECT TRIGGER POINT, 1 OR 2    triamcinolone acetonide (KENALOG-40) injection 20 mg   2. Lupus ([address]     3. Hoarseness of voice               Plan:        Trapezius muscle spasm: worsening; I recommended a trigger point injection since she does not tolerate oral steroids. She was willing so an injection was given. A trigger point injection was performed at the site of maximal tenderness using 2% plain Lidocaine and 20mg of Kenalog. This was well tolerated, and followed by moderate relief of pain. Lupus: worsening; this is most likely contributing to her pain. I suggested oral steroids but she does not tolerate them. Hoarseness: new; I recommended vocal rest and chloraseptic. Return if symptoms worsen or fail to improve.     Orders Placed This Encounter   Procedures    IL INJECT TRIGGER POINT, 1 OR 2     Orders Placed This Encounter   Medications    triamcinolone acetonide (KENALOG-40) injection 20 mg    phenol (CHLORASEPTIC MOUTH PAIN) 1.4 % LIQD mouth spray     Sig: Take 1 drop by mouth every 2 hours as needed for Sore Throat     Dispense:  50 mL     Refill:  0       Patientgiven educational materials - see patient instructions. Discussed use, benefit,and side effects of prescribed medications. All patient questions answered. Ptvoiced understanding. Reviewed health maintenance. Instructed to continue currentmedications, diet and exercise. Patient agreed with treatment plan. Follow up asdirected.      Electronically signed by Sera Ramirez MD on 1/11/2022 at 11:12 PM

## 2022-01-14 ENCOUNTER — VIRTUAL VISIT (OUTPATIENT)
Dept: FAMILY MEDICINE CLINIC | Age: 62
End: 2022-01-14
Payer: MEDICARE

## 2022-01-14 DIAGNOSIS — J04.0 LARYNGITIS: Primary | ICD-10-CM

## 2022-01-14 PROCEDURE — 99213 OFFICE O/P EST LOW 20 MIN: CPT | Performed by: NURSE PRACTITIONER

## 2022-01-14 PROCEDURE — 3017F COLORECTAL CA SCREEN DOC REV: CPT | Performed by: NURSE PRACTITIONER

## 2022-01-14 PROCEDURE — 1036F TOBACCO NON-USER: CPT | Performed by: NURSE PRACTITIONER

## 2022-01-14 PROCEDURE — G8427 DOCREV CUR MEDS BY ELIG CLIN: HCPCS | Performed by: NURSE PRACTITIONER

## 2022-01-14 PROCEDURE — G8417 CALC BMI ABV UP PARAM F/U: HCPCS | Performed by: NURSE PRACTITIONER

## 2022-01-14 PROCEDURE — 99212 OFFICE O/P EST SF 10 MIN: CPT

## 2022-01-14 PROCEDURE — G8484 FLU IMMUNIZE NO ADMIN: HCPCS | Performed by: NURSE PRACTITIONER

## 2022-01-14 RX ORDER — PREDNISONE 10 MG/1
10 TABLET ORAL DAILY
Qty: 5 TABLET | Refills: 0 | Status: SHIPPED | OUTPATIENT
Start: 2022-01-14 | End: 2022-01-19

## 2022-01-14 RX ORDER — SULFAMETHOXAZOLE AND TRIMETHOPRIM 800; 160 MG/1; MG/1
1 TABLET ORAL 2 TIMES DAILY
Qty: 14 TABLET | Refills: 0 | Status: SHIPPED | OUTPATIENT
Start: 2022-01-14 | End: 2022-01-21

## 2022-01-14 ASSESSMENT — ENCOUNTER SYMPTOMS: SORE THROAT: 1

## 2022-01-14 NOTE — PROGRESS NOTES
Subjective:      Patient ID: Jose Orellana is a 59 y.o. female coming in for   Chief Complaint   Patient presents with    Laryngitis     loss of voice x 5 days and a runny nose. states that she has had this issue before ad atb bactrim is the only thing that helped. video visit    Pharyngitis  This is a new problem. Episode onset: ongoing symtpoms started 10 days ago. The problem has been unchanged. Associated symptoms include congestion and a sore throat. Pertinent negatives include no fever. Associated symptoms comments: Loss of voice. Treatments tried: tylenol and nyquil. Review of Systems   Constitutional: Negative for fever. HENT: Positive for congestion and sore throat. Objective:   Physical Exam  Vitals and nursing note reviewed. Constitutional:       Appearance: Normal appearance. HENT:      Nose: Congestion present. Mouth/Throat:      Comments: Hoarse voice  Pulmonary:      Effort: Pulmonary effort is normal.   Neurological:      General: No focal deficit present. Mental Status: She is alert and oriented to person, place, and time. Assessment:      1. Laryngitis           Plan:   meds as prescribed. Call office if symptoms worsen/persist.      No orders of the defined types were placed in this encounter. Outpatient Encounter Medications as of 1/14/2022   Medication Sig Dispense Refill    sulfamethoxazole-trimethoprim (BACTRIM DS;SEPTRA DS) 800-160 MG per tablet Take 1 tablet by mouth 2 times daily for 7 days 14 tablet 0    predniSONE (DELTASONE) 10 MG tablet Take 1 tablet by mouth daily for 5 days 5 tablet 0    phenol (CHLORASEPTIC MOUTH PAIN) 1.4 % LIQD mouth spray Take 1 drop by mouth every 2 hours as needed for Sore Throat 50 mL 0    cyclobenzaprine (FLEXERIL) 10 MG tablet Take 1 tablet by mouth 3 times daily as needed for Muscle spasms 90 tablet 1    UNABLE TO FIND Handicap placard. Orthopedic limitations.  Expires 10/13/2026 1 each 0    omeprazole

## 2022-02-23 DIAGNOSIS — I10 ESSENTIAL HYPERTENSION, BENIGN: Primary | ICD-10-CM

## 2022-02-23 RX ORDER — AMLODIPINE BESYLATE 10 MG/1
10 TABLET ORAL DAILY
Qty: 30 TABLET | Refills: 2 | Status: SHIPPED | OUTPATIENT
Start: 2022-02-23

## 2022-02-23 NOTE — TELEPHONE ENCOUNTER
Sandy Moore called requesting a refill of the below medication which has been pended for you:     Requested Prescriptions     Pending Prescriptions Disp Refills    amLODIPine (NORVASC) 10 MG tablet 30 tablet 0     Sig: Take 1 tablet by mouth daily       Last Appointment Date: 1/14/2022  Next Appointment Date: 3/16/2022    Allergies   Allergen Reactions    Latex Swelling     The powder in the gloves    Dilaudid [Hydromorphone Hcl] Nausea And Vomiting    Medrol [Methylprednisolone] Other (See Comments)     Back pain    Prednisone Nausea And Vomiting

## 2022-03-16 ENCOUNTER — OFFICE VISIT (OUTPATIENT)
Dept: FAMILY MEDICINE CLINIC | Age: 62
End: 2022-03-16
Payer: MEDICARE

## 2022-03-16 VITALS
DIASTOLIC BLOOD PRESSURE: 80 MMHG | WEIGHT: 178.6 LBS | BODY MASS INDEX: 35.06 KG/M2 | HEIGHT: 60 IN | OXYGEN SATURATION: 98 % | SYSTOLIC BLOOD PRESSURE: 122 MMHG | RESPIRATION RATE: 16 BRPM | HEART RATE: 105 BPM

## 2022-03-16 DIAGNOSIS — M54.12 CERVICAL RADICULOPATHY: ICD-10-CM

## 2022-03-16 DIAGNOSIS — Z00.00 MEDICARE ANNUAL WELLNESS VISIT, SUBSEQUENT: Primary | ICD-10-CM

## 2022-03-16 PROCEDURE — 3017F COLORECTAL CA SCREEN DOC REV: CPT | Performed by: NURSE PRACTITIONER

## 2022-03-16 PROCEDURE — 99396 PREV VISIT EST AGE 40-64: CPT

## 2022-03-16 PROCEDURE — G8484 FLU IMMUNIZE NO ADMIN: HCPCS | Performed by: NURSE PRACTITIONER

## 2022-03-16 PROCEDURE — G0463 HOSPITAL OUTPT CLINIC VISIT: HCPCS

## 2022-03-16 PROCEDURE — G0439 PPPS, SUBSEQ VISIT: HCPCS | Performed by: NURSE PRACTITIONER

## 2022-03-16 RX ORDER — BIOTIN 10000 MCG
CAPSULE ORAL
COMMUNITY

## 2022-03-16 RX ORDER — UBIDECARENONE 75 MG
50 CAPSULE ORAL DAILY
COMMUNITY

## 2022-03-16 ASSESSMENT — PATIENT HEALTH QUESTIONNAIRE - PHQ9
1. LITTLE INTEREST OR PLEASURE IN DOING THINGS: 0
SUM OF ALL RESPONSES TO PHQ QUESTIONS 1-9: 0
SUM OF ALL RESPONSES TO PHQ9 QUESTIONS 1 & 2: 0
SUM OF ALL RESPONSES TO PHQ QUESTIONS 1-9: 0
2. FEELING DOWN, DEPRESSED OR HOPELESS: 0

## 2022-03-16 ASSESSMENT — LIFESTYLE VARIABLES
HOW OFTEN DO YOU HAVE A DRINK CONTAINING ALCOHOL: MONTHLY OR LESS
HOW MANY STANDARD DRINKS CONTAINING ALCOHOL DO YOU HAVE ON A TYPICAL DAY: 1 OR 2

## 2022-03-16 NOTE — PROGRESS NOTES
Subjective:      Patient ID: Beni Howe is a 59 y.o. female coming in for   Chief Complaint   Patient presents with    6 Month Follow-Up     6 month follow up.  Medicare AWV    Numbness     left sided arm numbness. Medicare Annual Wellness Visit    Beni Howe is here for 6 Month Follow-Up (6 month follow up. ), Medicare AWV, and Numbness (left sided arm numbness.)    Assessment & Plan   Medicare annual wellness visit, subsequent     Cervical radiculopathy  Still having arm/hand numbness on 4th/5th digits and lateral aspect of hand. Pt has had some evaluation of this, is accepting that it will probably not change    Recommendations for Preventive Services Due: see orders and patient instructions/AVS.  Recommended screening schedule for the next 5-10 years is provided to the patient in written form: see Patient Instructions/AVS.    No follow-ups on file. Subjective  The following acute and/or chronic problems were also addressed today:    Patient's complete Health Risk Assessment and screening values have been reviewed and are found in Flowsheets. The following problems were reviewed today and where indicated follow up appointments were made and/or referrals ordered.     Positive Risk Factor Screenings with Interventions:            General Health and ACP:  General  In general, how would you say your health is?: Good  In the past 7 days, have you experienced any of the following: New or Increased Pain, New or Increased Fatigue, Loneliness, Social Isolation, Stress or Anger?: (!) Yes  Select all that apply: (!) New or Increased Pain  Do you get the social and emotional support that you need?: Yes  Do you have a Living Will?: (!) No    Advance Directives    Power of  Living Will ACP-Advance Directive ACP-Power of      Not on File Not on File Not on File Not on File     General Health Risk Interventions:  Pain issues: patient declines any further evaluation/treatment for this issue    Health Habits/Nutrition:  Physical Activity: Insufficiently Active      Days of Exercise per Week: 7 days      Minutes of Exercise per Session: 10 min  Have you lost any weight without trying in the past 3 months?: No  Body mass index: (!) 34.88  Have you seen the dentist within the past year?: (!) No    Health Habits/Nutrition Interventions:  Inadequate physical activity:  patient agrees to exercise for at least 150 minutes/week  Nutritional issues:  educational materials for healthy, well-balanced diet provided  Dental exam overdue:  patient encouraged to make appointment with his/her dentist    Hearing/Vision:  Do you or your family notice any trouble with your hearing that hasn't been managed with hearing aids?: (!) Yes  Do you have difficulty driving, watching TV, or doing any of your daily activities because of your eyesight?: (!) Yes  Have you had an eye exam within the past year?: Yes  No exam data present    Hearing/Vision Interventions:  Hearing concerns:  patient declines any further evaluation/treatment for hearing issues    ADLs:  In the past 7 days, did you need help from others to perform any of the following everyday activities: Eating, dressing, grooming, bathing, toileting, or walking/balance?: No  In the past 7 days, did you need help from others to take care of any of the following: Laundry, housekeeping, banking/finances, shopping, telephone use, food preparation, transportation, or taking medications?: (!) Yes  Select all that apply: (!) Laundry    ADL Interventions:  Patient declines any further evaluation/treatment for this issue        Objective  -------------------------------                 03/16/22                          1317           -------------------------------   BP:            122/80           Pulse:           105            Resp:            16             SpO2:            98%            Weight: 178 lb 9.6 oz (81 kg)   Height:     5' (1.524 m) -------------------------------   Body mass index is 34.88 kg/m². General Appearance: alert and oriented to person, place and time, well-developed and well-nourished, in no acute distress  Skin: warm and dry, no rash or erythema  Pulmonary/Chest: clear to auscultation bilaterally- no wheezes, rales or rhonchi, normal air movement, no respiratory distress  Cardiovascular: normal rate, normal S1 and S2, no gallops, intact distal pulses and no carotid bruits  Extremities: no cyanosis and no clubbing  Neurologic: gait and coordination normal and speech normal     -- Latex -- Swelling   --  The powder in the gloves  -- Dilaudid (Hydromorphone Hcl) -- Nausea And Vomiting  -- Medrol (Methylprednisolone) -- Other (See Comments)   --  Back pain  -- Prednisone -- Nausea And Vomiting  Prior to Visit Medications :  Medication Biotin 10 MG CAPS, Sig Take by mouth, Taking? Yes, Authorizing Provider Pallavi Rollins MD    Medication vitamin B-12 (CYANOCOBALAMIN) 100 MCG tablet, Sig Take 50 mcg by mouth daily, Taking? Yes, Authorizing Provider Pallavi Rollins MD    Medication amLODIPine (NORVASC) 10 MG tablet, Sig Take 1 tablet by mouth daily, Taking? Yes, Authorizing Provider MAX Hendrickson CNP    Medication phenol (CHLORASEPTIC MOUTH PAIN) 1.4 % LIQD mouth spray, Sig Take 1 drop by mouth every 2 hours as needed for Sore Throat, Taking? Yes, Authorizing Provider Flaca Melgar MD    Medication cyclobenzaprine (FLEXERIL) 10 MG tablet, Sig Take 1 tablet by mouth 3 times daily as needed for Muscle spasms, Taking? Yes, Authorizing Provider MAX Hendrickson CNP    Medication UNABLE TO FIND, Sig Handicap placard. Orthopedic limitations. Expires 10/13/2026, Taking? Yes, Authorizing Provider MAX Hendrickson CNP    Medication omeprazole (PRILOSEC) 20 MG delayed release capsule, Sig Take 1 capsule by mouth every morning (before breakfast), Taking?  Yes, Authorizing Provider Angie Valle MD Qing    Medication ferrous sulfate (FE TABS 325) 325 (65 Fe) MG EC tablet, Sig Take 1 tablet by mouth daily (with breakfast), Taking? Yes, Authorizing Provider MAX Hess - CNP    Medication vitamin D (ERGOCALCIFEROL) 1.25 MG (20319 UT) CAPS capsule, Sig Take 1 capsule by mouth once a week, Taking? Yes, Authorizing Provider MAX Hess - CNP    Medication dicyclomine (BENTYL) 10 MG capsule, Sig Take 1 capsule by mouth 4 times daily as needed (Cramps/Abdominal Pain), Taking? Yes, Authorizing Provider Leeroy Lentz, DO    Medication hyoscyamine (LEVBID) 375 MCG extended release tablet, Sig Take 1 tablet by mouth every 12 hours as needed for Cramping, Taking? Yes, Authorizing Provider Ashly Jimenez MD    Medication MAGNESIUM PO, Sig Take by mouth daily , Taking? Yes, Authorizing Provider Pallavi Rollins MD    Medication Handicap Placard [facility], Sig by Does not apply route Issue parking placard for persons with disabilities. [facility] sec 4503.44. Expires in 5 years. Applicant meets the qualifying disability criteria., Taking? Yes, Authorizing Provider Leeroy Lentz, DO    Medication diclofenac sodium 1 % GEL, Sig Apply 2 g topically 4 times daily, Taking? Yes, Authorizing Provider MAX Mcginnis - CNP    Medication Multiple Vitamins-Minerals (MULTI-VITAMIN/MINERALS PO), Sig Take 1 tablet by mouth 2 times daily, Taking? Yes, Authorizing Provider Pallavi Rollins MD    Medication Cholecalciferol (VITAMIN D3) 53984 units CAPS, Sig Take 1 capsule by mouth daily, Taking? Yes, Authorizing Provider Leeroy Lentz DO    Medication docusate sodium (COLACE) 100 MG capsule, Sig Take 1 capsule by mouth 2 times daily, Taking?  Yes, Authorizing Provider Leeroy Lentz DO    CareTeam (Including outside providers/suppliers regularly involved in providing care):   Patient Care Team:  MAX De La Cruz CNP as PCP - General (Certified Nurse Practitioner)  Jeison Sebastian APRN - CNP as PCP - [facility] [place] Provider  Dedra Low MD as Consulting Physician (Physical Medicine and Rehab)  MAX Mccrary CNP (Nurse Practitioner)  MD Mauricio Angel MD (General Surgery)    Reviewed and updated this visit:  Allergies  Meds            Assessment:      1. Medicare annual wellness visit, subsequent    2. Cervical radiculopathy           Plan:   - up to date on all health maintenance screenings  -offered further workup/treatment of cervical radiculopathy, declined at this time. -f/u in 6 months or sooner if needed     No orders of the defined types were placed in this encounter. Outpatient Encounter Medications as of 3/16/2022   Medication Sig Dispense Refill    Biotin 10 MG CAPS Take by mouth      vitamin B-12 (CYANOCOBALAMIN) 100 MCG tablet Take 50 mcg by mouth daily      amLODIPine (NORVASC) 10 MG tablet Take 1 tablet by mouth daily 30 tablet 2    phenol (CHLORASEPTIC MOUTH PAIN) 1.4 % LIQD mouth spray Take 1 drop by mouth every 2 hours as needed for Sore Throat 50 mL 0    cyclobenzaprine (FLEXERIL) 10 MG tablet Take 1 tablet by mouth 3 times daily as needed for Muscle spasms 90 tablet 1    UNABLE TO FIND Handicap placard. Orthopedic limitations.  Expires 10/13/2026 1 each 0    omeprazole (PRILOSEC) 20 MG delayed release capsule Take 1 capsule by mouth every morning (before breakfast) 30 capsule 0    ferrous sulfate (FE TABS 325) 325 (65 Fe) MG EC tablet Take 1 tablet by mouth daily (with breakfast) 30 tablet 0    vitamin D (ERGOCALCIFEROL) 1.25 MG (61628 UT) CAPS capsule Take 1 capsule by mouth once a week 12 capsule 0    dicyclomine (BENTYL) 10 MG capsule Take 1 capsule by mouth 4 times daily as needed (Cramps/Abdominal Pain) 120 capsule 2    hyoscyamine (LEVBID) 375 MCG extended release tablet Take 1 tablet by mouth every 12 hours as needed for Cramping 60 tablet 3    MAGNESIUM PO Take by mouth daily       Handicap Placard [facility] by Does not apply route Issue parking placard for persons with disabilities. [facility] sec 4503.44. Expires in 5 years. Applicant meets the qualifying disability criteria. 1 each 0    diclofenac sodium 1 % GEL Apply 2 g topically 4 times daily 3 Tube 3    Multiple Vitamins-Minerals (MULTI-VITAMIN/MINERALS PO) Take 1 tablet by mouth 2 times daily      Cholecalciferol (VITAMIN D3) 12660 units CAPS Take 1 capsule by mouth daily 30 capsule 5    docusate sodium (COLACE) 100 MG capsule Take 1 capsule by mouth 2 times daily 60 capsule 5     No facility-administered encounter medications on file as of 3/16/2022.             Verner Days, APRN - CNP

## 2022-09-06 ENCOUNTER — HOSPITAL ENCOUNTER (EMERGENCY)
Age: 62
Discharge: HOME | End: 2022-09-06
Attending: EMERGENCY MEDICINE
Payer: MEDICARE

## 2022-09-06 VITALS
TEMPERATURE: 97.9 F | HEART RATE: 118 BPM | SYSTOLIC BLOOD PRESSURE: 135 MMHG | OXYGEN SATURATION: 93 % | WEIGHT: 175 LBS | RESPIRATION RATE: 18 BRPM | DIASTOLIC BLOOD PRESSURE: 94 MMHG | BODY MASS INDEX: 34.18 KG/M2

## 2022-09-06 DIAGNOSIS — M79.672 LEFT FOOT PAIN: Primary | ICD-10-CM

## 2022-09-06 PROCEDURE — 6360000002 HC RX W HCPCS: Performed by: EMERGENCY MEDICINE

## 2022-09-06 PROCEDURE — 99284 EMERGENCY DEPT VISIT MOD MDM: CPT

## 2022-09-06 PROCEDURE — 96372 THER/PROPH/DIAG INJ SC/IM: CPT

## 2022-09-06 RX ORDER — KETOROLAC TROMETHAMINE 30 MG/ML
30 INJECTION, SOLUTION INTRAMUSCULAR; INTRAVENOUS ONCE
Status: COMPLETED | OUTPATIENT
Start: 2022-09-06 | End: 2022-09-06

## 2022-09-06 RX ADMIN — KETOROLAC TROMETHAMINE 30 MG: 30 INJECTION, SOLUTION INTRAMUSCULAR at 18:38

## 2022-09-06 ASSESSMENT — PAIN DESCRIPTION - FREQUENCY: FREQUENCY: CONTINUOUS

## 2022-09-06 ASSESSMENT — PAIN SCALES - GENERAL
PAINLEVEL_OUTOF10: 10
PAINLEVEL_OUTOF10: 10

## 2022-09-06 ASSESSMENT — PAIN DESCRIPTION - DESCRIPTORS: DESCRIPTORS: SPASM

## 2022-09-06 ASSESSMENT — PAIN DESCRIPTION - ORIENTATION: ORIENTATION: LEFT;LOWER

## 2022-09-06 ASSESSMENT — PAIN DESCRIPTION - LOCATION: LOCATION: LEG;FOOT

## 2022-09-06 ASSESSMENT — PAIN - FUNCTIONAL ASSESSMENT: PAIN_FUNCTIONAL_ASSESSMENT: 0-10

## 2022-09-06 NOTE — ED TRIAGE NOTES
Patient states that she does not remember her home medications. She states that she takes a blood pressure medication that starts with a \"t\" daily and a muscle relaxer as needed.

## 2022-09-06 NOTE — ED PROVIDER NOTES
[facility] ED  [address]  Phone: [phone]        Pt Name: Meagan Wilson  MRN: [id number]  [place] 1960  Date of evaluation: 22      CHIEF COMPLAINT     Chief Complaint   Patient presents with    Leg Pain     Left lower leg and foot         HISTORY OF PRESENT ILLNESS  (Location/Symptom, Timing/Onset, Context/Setting, Quality, Duration, Modifying Factors, Severity.)    Meagan Wilson is a 57 y.o. female who presents with pain to the dorsum of her left foot. Patient denies sustaining any trauma to her foot. She denies fever or chills. The patient has a history of lupus, and states that she believes her lupus is causing her discomfort. REVIEW OF SYSTEMS    (2-9 systems for level 4, 10 or more for level 5)     Constitutional: no fever, chills, fatigue  HENT: No headache, nasal congestion, sore throat, hearing changes, ear pain or discharge  Eyes: no visual changes or photophobia  Respiratory: no cough, shortness of breath, or wheezing  Cardiovascular: no chest pain, palpitations, or leg swelling  Abdominal: no abdominal pain, nausea, vomiting, diarrhea, or constipation  Genitourinary: no dysuria, frequency, or urgency  Musculoskeletal: no arthralgias, myalgias, neck or back pain  Skin: no rash or wound  Neurological: no numbness, tingling or weakness  Hematologic:  no history of easy bleeding or bruising            PAST MEDICAL HISTORY    has a past medical history of Anxiety, Asthma, Cerebral artery occlusion with cerebral infarction [facility]), Cerebrovascular disease, Depression, Diverticulitis, Fibromyalgia, History of small bowel obstruction, Hypertension, Lupus ([address] and Seizures ([facility] [address] SURGICAL HISTORY      has a past surgical history that includes Appendectomy;  section; Hysterectomy (); Dilation and curettage of uterus; Tubal ligation; Colonoscopy (); Colonoscopy (2015);  Tooth Extraction (3/1/16); other surgical history (Left, 12/30/2016); pr egd transoral biopsy single/multiple (N/A, 4/13/2017); Colonoscopy (N/A, 9/29/2020); Colonoscopy (N/A, 7/29/2021); and Upper gastrointestinal endoscopy (N/A, 8/18/2021). [address]       Discharge Medication List as of 9/6/2022  7:06 PM        CONTINUE these medications which have NOT CHANGED    Details   Biotin 10 MG CAPS Take by mouthHistorical Med      vitamin B-12 (CYANOCOBALAMIN) 100 MCG tablet Take 50 mcg by mouth dailyHistorical Med      amLODIPine (NORVASC) 10 MG tablet Take 1 tablet by mouth daily, Disp-30 tablet, R-2Normal      phenol (CHLORASEPTIC MOUTH PAIN) 1.4 % LIQD mouth spray Take 1 drop by mouth every 2 hours as needed for Sore Throat, Disp-50 mL, R-0Normal      cyclobenzaprine (FLEXERIL) 10 MG tablet Take 1 tablet by mouth 3 times daily as needed for Muscle spasms, Disp-90 tablet, R-1Normal      UNABLE TO FIND Handicap placard. Orthopedic limitations. Expires 10/13/2026, Disp-1 each, R-0Print      omeprazole (PRILOSEC) 20 MG delayed release capsule Take 1 capsule by mouth every morning (before breakfast), Disp-30 capsule, R-0Normal      ferrous sulfate (FE TABS 325) 325 (65 Fe) MG EC tablet Take 1 tablet by mouth daily (with breakfast), Disp-30 tablet, R-0Normal      vitamin D (ERGOCALCIFEROL) 1.25 MG (60420 UT) CAPS capsule Take 1 capsule by mouth once a week, Disp-12 capsule, R-0Normal      dicyclomine (BENTYL) 10 MG capsule Take 1 capsule by mouth 4 times daily as needed (Cramps/Abdominal Pain), Disp-120 capsule, R-2Normal      hyoscyamine (LEVBID) 375 MCG extended release tablet Take 1 tablet by mouth every 12 hours as needed for Cramping, Disp-60 tablet,R-3Normal      MAGNESIUM PO Take by mouth daily Historical Med      Handicap Placard MISC Starting Wed 9/16/2020, Disp-1 each,R-0, PrintIssue parking placard for persons with disabilities. [facility] sec 4503.44. Expires in 5 years. Applicant meets the qualifying disability criteria.       diclofenac sodium 1 % GEL Apply 2 g topically 4 times daily, Topical, 4 TIMES DAILY Starting 2018, Disp-3 Tube, R-3, Normal      Multiple Vitamins-Minerals (MULTI-VITAMIN/MINERALS PO) Take 1 tablet by mouth 2 times dailyHistorical Med      Cholecalciferol (VITAMIN D3) 75949 units CAPS Take 1 capsule by mouth daily, Disp-30 capsule, R-5Normal      docusate sodium (COLACE) 100 MG capsule Take 1 capsule by mouth 2 times daily, Disp-60 capsule, R-5Normal             ALLERGIES     is allergic to latex, dilaudid [hydromorphone hcl], medrol [methylprednisolone], and prednisone. FAMILY HISTORY     She indicated that her mother is . She indicated that her father is . She indicated that her maternal aunt is . She indicated that her maternal uncle is . She indicated that the status of her neg hx is unknown.     family history includes Dementia in her father; Diabetes in her maternal aunt, maternal uncle, and mother; Other in her father and mother. SOCIAL HISTORY      reports that she quit smoking about 29 years ago. She has a 2.50 pack-year smoking history. She has never used smokeless tobacco. She reports current alcohol use. She reports that she does not use drugs. PHYSICAL EXAM    (up to 7 for level 4, 8 or more for level 5)   INITIAL VITALS:  weight is 175 lb (79.4 kg). Her tympanic temperature is 97.9 °F (36.6 °C). Her blood pressure is 135/94 (abnormal) and her pulse is 118 (abnormal). Her respiration is 18 and oxygen saturation is 93%. Physical Exam  Vitals and nursing note reviewed. Constitutional:       Appearance: Normal appearance. Musculoskeletal:      Cervical back: Normal range of motion and neck supple. Left ankle: No swelling, deformity, ecchymosis or lacerations. Tenderness present. Normal range of motion. Anterior drawer test negative. Normal pulse. Right foot: Normal.      Left foot: Normal range of motion and normal capillary refill.  Tenderness and bony tenderness present. No swelling, deformity, bunion, laceration or crepitus. Normal pulse. Comments: Tender over the dorsum of the foot and ankle. No swelling noted on exam. No effusion. No deformity. No skin breakdown. Compartments are soft. Skin:     General: Skin is warm and dry. Capillary Refill: Capillary refill takes less than 2 seconds. Neurological:      Mental Status: She is alert and oriented to person, place, and time. Mental status is at baseline. Psychiatric:         Mood and Affect: Mood normal.         Behavior: Behavior normal.       DIFFERENTIAL DIAGNOSIS/ MDM:     The patient presented with foot pain. A fracture was not detected on X-ray. The ankle and knee joints were not affected. No skin lesions were seen. There are no signs of compartment syndrome. The pulses are 2/4. The motor is 5/5. The sensation is intact. The patient was advised to return to the Emergency Department for increasing pain, numbness, weakness, or coldness to the extremity. The patient was further instructed to follow up in 2-3 days with their family doctor or orthopedics. The patient voiced understanding of these instructions. The patient presented with ankle pain. A fracture was not detected on X-ray. The knee joint was not affected and the foot is stable on exam. No skin lesions were seen. There are no signs of compartment syndrome. The pulses are 2/4. The motor is 5/5. The sensation is intact. The patient was instructed to follow up in 2-3 days with their family doctor or orthopedics. We also discussed returning to the Emergency Department immediately if new or worsening symptoms occur. We have discussed the symptoms which are most concerning (e.g., increasing pain, numbness, weakness, color change or coldness to the extremity) that necessitate immediate return.       The patient understands that at this time there is no evidence for a more malignant underlying process, but the patient also understands that early in the process of an illness or injury, an emergency department workup can be falsely reassuring. Routine discharge counseling was given, and the patient understands that worsening, changing or persistent symptoms should prompt an immediate call or follow up with their primary physician or return to the emergency department. The importance of appropriate follow up was also discussed. I have reviewed the disposition diagnosis with the patient and or their family/guardian. I have answered their questions and given discharge instructions. They voiced understanding of these instructions and did not have any further questions or complaints. DIAGNOSTIC RESULTS     EKG: All EKG's are interpreted by the Emergency Department Physician who either signs or Co-signs this chart in the absence of a cardiologist.    none    RADIOLOGY:        Interpretation per the Radiologist below, if available at the time of this note:    none    LABS:  No results found for this visit on 09/06/22. none    EMERGENCY DEPARTMENT COURSE:   Vitals:    Vitals:    09/06/22 1810   BP: (!) 135/94   Pulse: (!) 118   Resp: 18   Temp: 97.9 °F (36.6 °C)   TempSrc: Tympanic   SpO2: 93%   Weight: 175 lb (79.4 kg)     -------------------------  BP: (!) 135/94, Temp: 97.9 °F (36.6 °C), Heart Rate: (!) 118, Resp: 18          CONSULTS:  none    PROCEDURES:  None    FINAL IMPRESSION      1.  Left foot pain          DISPOSITION/PLAN   DISPOSITION Decision To Discharge 09/06/2022 07:05:20 PM      CONDITION ON DISPOSITION:   Stable     PATIENT REFERRED TO:  MAX Gonzalez CNP  [facility]  [phone]    Schedule an appointment as soon as possible for a visit in 3 days      DISCHARGE MEDICATIONS:  Discharge Medication List as of 9/6/2022  7:06 PM          (Please note that portions of this note were completed with a voicerecognition program.  Efforts were made to edit the dictations but occasionally words are mis-transcribed.)    Julianna Crawford MD  Attending Emergency Medicine Physician        Julianna Crawford MD  09/06/22 2010

## 2022-09-12 ENCOUNTER — OFFICE VISIT (OUTPATIENT)
Dept: FAMILY MEDICINE CLINIC | Age: 62
End: 2022-09-12
Payer: MEDICARE

## 2022-09-12 VITALS
WEIGHT: 172.6 LBS | SYSTOLIC BLOOD PRESSURE: 122 MMHG | RESPIRATION RATE: 16 BRPM | DIASTOLIC BLOOD PRESSURE: 74 MMHG | HEART RATE: 111 BPM | OXYGEN SATURATION: 97 % | BODY MASS INDEX: 33.89 KG/M2 | HEIGHT: 60 IN

## 2022-09-12 DIAGNOSIS — H93.13 TINNITUS OF BOTH EARS: ICD-10-CM

## 2022-09-12 DIAGNOSIS — R00.0 TACHYCARDIA: ICD-10-CM

## 2022-09-12 DIAGNOSIS — M67.972 UNSPECIFIED DISORDER OF SYNOVIUM AND TENDON, LEFT ANKLE AND FOOT: Primary | ICD-10-CM

## 2022-09-12 DIAGNOSIS — R00.2 PALPITATIONS: ICD-10-CM

## 2022-09-12 DIAGNOSIS — R07.9 CHEST PAIN, UNSPECIFIED TYPE: ICD-10-CM

## 2022-09-12 PROBLEM — H16.9 KERATITIS: Status: RESOLVED | Noted: 2020-05-12 | Resolved: 2022-09-12

## 2022-09-12 PROBLEM — M25.60 JOINT STIFFNESS: Status: RESOLVED | Noted: 2019-02-14 | Resolved: 2022-09-12

## 2022-09-12 PROBLEM — M25.50 PAIN IN JOINT: Status: RESOLVED | Noted: 2019-02-14 | Resolved: 2022-09-12

## 2022-09-12 PROBLEM — R19.7 DIARRHEA: Status: RESOLVED | Noted: 2020-09-28 | Resolved: 2022-09-12

## 2022-09-12 PROBLEM — D72.829 LEUKOCYTOSIS: Status: RESOLVED | Noted: 2019-05-14 | Resolved: 2022-09-12

## 2022-09-12 PROBLEM — R55 NEAR SYNCOPE: Status: RESOLVED | Noted: 2018-11-19 | Resolved: 2022-09-12

## 2022-09-12 PROBLEM — F43.22 ADJUSTMENT DISORDER WITH ANXIOUS MOOD: Status: RESOLVED | Noted: 2019-02-14 | Resolved: 2022-09-12

## 2022-09-12 PROBLEM — R10.12 LEFT UPPER QUADRANT PAIN: Status: RESOLVED | Noted: 2017-03-11 | Resolved: 2022-09-12

## 2022-09-12 PROCEDURE — 99215 OFFICE O/P EST HI 40 MIN: CPT | Performed by: NURSE PRACTITIONER

## 2022-09-12 PROCEDURE — G8427 DOCREV CUR MEDS BY ELIG CLIN: HCPCS | Performed by: NURSE PRACTITIONER

## 2022-09-12 PROCEDURE — 3017F COLORECTAL CA SCREEN DOC REV: CPT | Performed by: NURSE PRACTITIONER

## 2022-09-12 PROCEDURE — G8417 CALC BMI ABV UP PARAM F/U: HCPCS | Performed by: NURSE PRACTITIONER

## 2022-09-12 PROCEDURE — 1036F TOBACCO NON-USER: CPT | Performed by: NURSE PRACTITIONER

## 2022-09-12 PROCEDURE — 99214 OFFICE O/P EST MOD 30 MIN: CPT

## 2022-09-12 RX ORDER — KETOROLAC TROMETHAMINE 10 MG/1
10 TABLET, FILM COATED ORAL 3 TIMES DAILY PRN
Qty: 90 TABLET | Refills: 0 | Status: SHIPPED | OUTPATIENT
Start: 2022-09-12 | End: 2022-10-12

## 2022-09-12 ASSESSMENT — PATIENT HEALTH QUESTIONNAIRE - PHQ9
7. TROUBLE CONCENTRATING ON THINGS, SUCH AS READING THE NEWSPAPER OR WATCHING TELEVISION: 0
SUM OF ALL RESPONSES TO PHQ QUESTIONS 1-9: 1
3. TROUBLE FALLING OR STAYING ASLEEP: 0
SUM OF ALL RESPONSES TO PHQ QUESTIONS 1-9: 1
2. FEELING DOWN, DEPRESSED OR HOPELESS: 0
1. LITTLE INTEREST OR PLEASURE IN DOING THINGS: 0
4. FEELING TIRED OR HAVING LITTLE ENERGY: 1
6. FEELING BAD ABOUT YOURSELF - OR THAT YOU ARE A FAILURE OR HAVE LET YOURSELF OR YOUR FAMILY DOWN: 0
8. MOVING OR SPEAKING SO SLOWLY THAT OTHER PEOPLE COULD HAVE NOTICED. OR THE OPPOSITE, BEING SO FIGETY OR RESTLESS THAT YOU HAVE BEEN MOVING AROUND A LOT MORE THAN USUAL: 0
5. POOR APPETITE OR OVEREATING: 0
SUM OF ALL RESPONSES TO PHQ QUESTIONS 1-9: 1
SUM OF ALL RESPONSES TO PHQ9 QUESTIONS 1 & 2: 0
9. THOUGHTS THAT YOU WOULD BE BETTER OFF DEAD, OR OF HURTING YOURSELF: 0
SUM OF ALL RESPONSES TO PHQ QUESTIONS 1-9: 1
10. IF YOU CHECKED OFF ANY PROBLEMS, HOW DIFFICULT HAVE THESE PROBLEMS MADE IT FOR YOU TO DO YOUR WORK, TAKE CARE OF THINGS AT HOME, OR GET ALONG WITH OTHER PEOPLE: 0

## 2022-09-12 NOTE — PROGRESS NOTES
rhythm. Tachycardia present. Heart sounds: Normal heart sounds. Pulmonary:      Effort: Pulmonary effort is normal.      Breath sounds: Normal breath sounds. Musculoskeletal:      Right lower leg: No edema. Left lower leg: No edema. Feet:       Comments: Ambulated with cane   Skin:     General: Skin is warm and dry. Findings: No rash. Neurological:      General: No focal deficit present. Mental Status: She is alert and oriented to person, place, and time. Assessment:      1. Unspecified disorder of synovium and tendon, left ankle and foot    2. Tinnitus of both ears    3. Chest pain, unspecified type    4. Tachycardia    5. Palpitations           Plan:    -toradol orally tid prn given  -wheeled walker script given  -offered ENT referral for tinnitus, pt declined  -will order cardiac work up for non specific chest pain and tachycardia. TSH was done Oct 2021, wnl   -f/u in 6 months, will contact pt with results and discuss plan of care    Orders Placed This Encounter   Procedures    NM MYOCARDIAL SPECT REST EXERCISE OR RX     Standing Status:   Future     Standing Expiration Date:   9/12/2023     Order Specific Question:   Reason for Exam?     Answer:   Chest pain     Order Specific Question:   Procedure Type     Answer:   Rx     Order Specific Question:   Reason for exam:     Answer:   chest pain/palpitations    Holter Monitor 48 Hour     Standing Status:   Future     Standing Expiration Date:   9/12/2023     Order Specific Question:   Reason for Exam?     Answer:    Tachycardia     Order Specific Question:   Reason for Exam?     Answer:   Palpitations    Cardiac Stress Test - w/Pharm     Standing Status:   Future     Standing Expiration Date:   11/12/2022     Order Specific Question:   Reason for Exam?     Answer:   Chest pain     Order Specific Question:   Does patient have left bundle branch block (LBBB) or left ventricular hypertrophy (LVH) based on resting ECG, a ventricular pacemaker, or is unable to exercise on a treadmill based on physical or mental limitations? Answer:   Yes    Walker rolling        Outpatient Encounter Medications as of 9/12/2022   Medication Sig Dispense Refill    ketorolac (TORADOL) 10 MG tablet Take 1 tablet by mouth 3 times daily as needed for Pain 90 tablet 0    Biotin 10 MG CAPS Take by mouth      vitamin B-12 (CYANOCOBALAMIN) 100 MCG tablet Take 50 mcg by mouth daily      amLODIPine (NORVASC) 10 MG tablet Take 1 tablet by mouth daily 30 tablet 2    phenol (CHLORASEPTIC MOUTH PAIN) 1.4 % LIQD mouth spray Take 1 drop by mouth every 2 hours as needed for Sore Throat 50 mL 0    cyclobenzaprine (FLEXERIL) 10 MG tablet Take 1 tablet by mouth 3 times daily as needed for Muscle spasms 90 tablet 1    UNABLE TO FIND Handicap placard. Orthopedic limitations. Expires 10/13/2026 1 each 0    omeprazole (PRILOSEC) 20 MG delayed release capsule Take 1 capsule by mouth every morning (before breakfast) 30 capsule 0    ferrous sulfate (FE TABS 325) 325 (65 Fe) MG EC tablet Take 1 tablet by mouth daily (with breakfast) 30 tablet 0    vitamin D (ERGOCALCIFEROL) 1.25 MG (05543 UT) CAPS capsule Take 1 capsule by mouth once a week 12 capsule 0    dicyclomine (BENTYL) 10 MG capsule Take 1 capsule by mouth 4 times daily as needed (Cramps/Abdominal Pain) 120 capsule 2    hyoscyamine (LEVBID) 375 MCG extended release tablet Take 1 tablet by mouth every 12 hours as needed for Cramping 60 tablet 3    MAGNESIUM PO Take by mouth daily       Handicap Placard MISC by Does not apply route Issue parking placard for persons with disabilities. [facility] sec 4503.44. Expires in 5 years. Applicant meets the qualifying disability criteria.  1 each 0    diclofenac sodium 1 % GEL Apply 2 g topically 4 times daily 3 Tube 3    Multiple Vitamins-Minerals (MULTI-VITAMIN/MINERALS PO) Take 1 tablet by mouth 2 times daily      Cholecalciferol (VITAMIN D3) 47754 units CAPS Take 1 capsule by mouth daily 30 capsule 5    docusate sodium (COLACE) 100 MG capsule Take 1 capsule by mouth 2 times daily 60 capsule 5     No facility-administered encounter medications on file as of 9/12/2022.             MAX Silvestre - CNP

## 2022-09-20 ENCOUNTER — HOSPITAL ENCOUNTER (OUTPATIENT)
Dept: NON INVASIVE DIAGNOSTICS | Age: 62
Discharge: HOME | End: 2022-09-20
Payer: MEDICARE

## 2022-09-20 DIAGNOSIS — R00.2 PALPITATIONS: ICD-10-CM

## 2022-09-20 DIAGNOSIS — R00.0 TACHYCARDIA: ICD-10-CM

## 2022-09-20 DIAGNOSIS — R07.9 CHEST PAIN, UNSPECIFIED TYPE: ICD-10-CM

## 2022-09-20 PROCEDURE — 93225 XTRNL ECG REC<48 HRS REC: CPT

## 2022-09-20 PROCEDURE — 93226 XTRNL ECG REC<48 HR SCAN A/R: CPT

## 2022-09-22 ENCOUNTER — HOSPITAL ENCOUNTER (OUTPATIENT)
Dept: NON INVASIVE DIAGNOSTICS | Age: 62
Discharge: HOME | End: 2022-09-22

## 2022-10-05 DIAGNOSIS — R07.9 CHEST PAIN, UNSPECIFIED TYPE: Primary | ICD-10-CM

## 2022-10-05 DIAGNOSIS — I10 ESSENTIAL HYPERTENSION, BENIGN: ICD-10-CM

## 2022-10-05 DIAGNOSIS — R00.0 TACHYCARDIA: ICD-10-CM

## 2022-10-20 ENCOUNTER — HOSPITAL ENCOUNTER (OUTPATIENT)
Dept: NUCLEAR MEDICINE | Age: 62
Discharge: HOME | End: 2022-10-22
Payer: MEDICARE

## 2022-10-20 ENCOUNTER — HOSPITAL ENCOUNTER (OUTPATIENT)
Dept: NON INVASIVE DIAGNOSTICS | Age: 62
Discharge: HOME | End: 2022-10-20
Payer: MEDICARE

## 2022-10-20 DIAGNOSIS — R07.9 CHEST PAIN, UNSPECIFIED TYPE: ICD-10-CM

## 2022-10-20 DIAGNOSIS — R00.0 TACHYCARDIA: ICD-10-CM

## 2022-10-20 PROCEDURE — 6360000002 HC RX W HCPCS: Performed by: INTERNAL MEDICINE

## 2022-10-20 PROCEDURE — 93017 CV STRESS TEST TRACING ONLY: CPT

## 2022-10-20 PROCEDURE — 3430000000 HC RX DIAGNOSTIC RADIOPHARMACEUTICAL: Performed by: NURSE PRACTITIONER

## 2022-10-20 PROCEDURE — A9500 TC99M SESTAMIBI: HCPCS | Performed by: NURSE PRACTITIONER

## 2022-10-20 PROCEDURE — 78452 HT MUSCLE IMAGE SPECT MULT: CPT

## 2022-10-20 RX ADMIN — TETRAKIS(2-METHOXYISOBUTYLISOCYANIDE)COPPER(I) TETRAFLUOROBORATE 10 MILLICURIE: 1 INJECTION, POWDER, LYOPHILIZED, FOR SOLUTION INTRAVENOUS at 09:05

## 2022-10-20 RX ADMIN — TETRAKIS(2-METHOXYISOBUTYLISOCYANIDE)COPPER(I) TETRAFLUOROBORATE 30 MILLICURIE: 1 INJECTION, POWDER, LYOPHILIZED, FOR SOLUTION INTRAVENOUS at 09:06

## 2022-10-20 RX ADMIN — REGADENOSON 0.4 MG: 0.08 INJECTION, SOLUTION INTRAVENOUS at 09:29

## 2022-10-20 NOTE — PROGRESS NOTES
Patient Name:  Jasmin Nichols MRN:  [id number]   :  1960  Age:  57 y.o. Sex: female     Ordering Provider: MAX Chamberlain CNP  Primary Care Provider:  MAX Chamberlain CNP     Indications: chest pain, tachycardia     Medications:     Current Outpatient Medications:     ketorolac (TORADOL) 10 MG tablet, Take 1 tablet by mouth 3 times daily as needed for Pain, Disp: 90 tablet, Rfl: 0    Biotin 10 MG CAPS, Take by mouth, Disp: , Rfl:     vitamin B-12 (CYANOCOBALAMIN) 100 MCG tablet, Take 50 mcg by mouth daily, Disp: , Rfl:     amLODIPine (NORVASC) 10 MG tablet, Take 1 tablet by mouth daily, Disp: 30 tablet, Rfl: 2    phenol (CHLORASEPTIC MOUTH PAIN) 1.4 % LIQD mouth spray, Take 1 drop by mouth every 2 hours as needed for Sore Throat, Disp: 50 mL, Rfl: 0    cyclobenzaprine (FLEXERIL) 10 MG tablet, Take 1 tablet by mouth 3 times daily as needed for Muscle spasms, Disp: 90 tablet, Rfl: 1    UNABLE TO FIND, Handicap placard. Orthopedic limitations. Expires 10/13/2026, Disp: 1 each, Rfl: 0    omeprazole (PRILOSEC) 20 MG delayed release capsule, Take 1 capsule by mouth every morning (before breakfast), Disp: 30 capsule, Rfl: 0    ferrous sulfate (FE TABS 325) 325 (65 Fe) MG EC tablet, Take 1 tablet by mouth daily (with breakfast), Disp: 30 tablet, Rfl: 0    vitamin D (ERGOCALCIFEROL) 1.25 MG (76744 UT) CAPS capsule, Take 1 capsule by mouth once a week, Disp: 12 capsule, Rfl: 0    dicyclomine (BENTYL) 10 MG capsule, Take 1 capsule by mouth 4 times daily as needed (Cramps/Abdominal Pain), Disp: 120 capsule, Rfl: 2    hyoscyamine (LEVBID) 375 MCG extended release tablet, Take 1 tablet by mouth every 12 hours as needed for Cramping, Disp: 60 tablet, Rfl: 3    MAGNESIUM PO, Take by mouth daily , Disp: , Rfl:     Handicap Placard MISC, by Does not apply route Issue parking placard for persons with disabilities. [facility] sec 4503.44. Expires in 5 years. Applicant meets the qualifying disability criteria. , Disp: 1 each, Rfl: 0    diclofenac sodium 1 % GEL, Apply 2 g topically 4 times daily, Disp: 3 Tube, Rfl: 3    Multiple Vitamins-Minerals (MULTI-VITAMIN/MINERALS PO), Take 1 tablet by mouth 2 times daily, Disp: , Rfl:     Cholecalciferol (VITAMIN D3) 48618 units CAPS, Take 1 capsule by mouth daily, Disp: 30 capsule, Rfl: 5    docusate sodium (COLACE) 100 MG capsule, Take 1 capsule by mouth 2 times daily, Disp: 60 capsule, Rfl: 5    Current Facility-Administered Medications:     regadenoson (LEXISCAN) injection 0.4 mg, 0.4 mg, IntraVENous, Once, Teresa Mora MD      ----------------------------------------------------------------------------------------------------------                Lexiscan 0.4 mg injected over 10 seconds. IV Cardiolite injected 20 seconds post Lexiscan injection. Heart Rate:  83  Resting Blood Pressure:  166/84   ----------------------------------------------------------------------------------------------------------     HR BP   1 min 106 137/79   2 min     3 min 103 138/81   4 min     5 min 100 140/82   6 min     7 min 90 159/89   8 min     9 min 96 151/92   10 min       Symptoms:  Chest Pain:  No      Nausea:  No     Headache:  No    Shortness of Breath:  Yes     Other:  \"heavy chest, tight throat\"      Electronically signed by Paty Ruiz RCP on 10/20/22 at 9:18 AM EDT    ----------------------------------------------------------------------------------------------------------  Resting EKG: Sinus rhythm, normal ECG    Arrhythmias: None    EKG Changes: No significant abnormality to suggest ischemia    Maximum Changes: N/A    Leads with maximum changes: N/A    Interpretation: Negative ECG portion of stress test for ischemia. Nuclear scan report to follow.     Supervising Physician:    Virginia Schmitt MD

## 2022-10-20 NOTE — PROCEDURES
[address]                 [address] [place] [id number]                              CARDIAC STRESS TEST    PATIENT NAME: ES PARHAM                    :        1960  MED REC NO:   [id number]                             ROOM:  ACCOUNT NO:   [de-identified]                           ADMIT DATE: 10/20/2022  PROVIDER:     Duane Agudelo    DATE OF STUDY:  10/20/2022    STRESS TEST    Ordering Provider:  Patsy Vasquez DO    Primary Care Provider:  VIC Polanco    Patient's Age:  66       Height:  5 feet 2 inches      Weight:  183  pounds    INDICATION:  Chest pain, palpitations. Lexiscan 0.4 mg injected over 10 seconds. IV Cardiolite injected 20 seconds post Lexiscan injection. Heart Rate:  83     Resting blood pressure:  166/84              HR   BP  1 min          106  137/79  2 min  3 min          103  138/81  4 min  5 min          100  140/82  6 min  7 min          90   159/89  8 min  9 min          96   151/92  10 min    Symptoms:  Chest Pain:  No  Nausea:  No  Headache:  No  Shortness of breath:  Yes  Other:  \"heavy chest, tight throat\"    Resting EKG:  Sinus rhythm, normal ECG. Arrhythmias:  None. EKG changes:  No significant abnormal to suggest ischemia. Maximum changes:  NA    Leads with maximum changes:  NA    INTERPRETATION:  Negative ECG portion of stress test for ischemia. Nuclear scan report to follow.     Nuclear Myocardial Perfusion Imaging (SPECT)    TESTING METHOD  STRESS:   Lexiscan  AGENT:    Cardiolite  REST:          Injection Date:  10/20/2022  Time:  1561  Amount:  10.3  mCi  STRESS:   Injection Date:  10/20/2022  Time:  0930  Amount:  30.21 mCi  IMAGE TIME:    Rest:  915  Stress:  1007    EF:  57%  TID:  1  LHR:  0.51    FINDINGS:  Ischemia (Reversible Defect):           No  Infarction (Irreversible Defect):       No  Normal Ejection Fraction:               Yes  Normal Segmental Wall Motion: Yes    Low risk study. IMPRESSION:  1. No ischemia (reversible defect). 2.  No infarction (fixed defect). 3.  Normal LVEF.         [facility]    D: 10/20/2022 16:11:54       T: 10/20/2022 16:13:12     HALLE/IRINA_NEHAIT  Job#: [id number]     Doc#: Unknown    CC:  DO Sudeep Ayala, APRN-CNP

## 2022-10-24 ENCOUNTER — OFFICE VISIT (OUTPATIENT)
Dept: CARDIOLOGY | Age: 62
End: 2022-10-24
Payer: MEDICARE

## 2022-10-24 VITALS
BODY MASS INDEX: 33.96 KG/M2 | HEART RATE: 107 BPM | HEIGHT: 60 IN | WEIGHT: 173 LBS | SYSTOLIC BLOOD PRESSURE: 144 MMHG | DIASTOLIC BLOOD PRESSURE: 83 MMHG

## 2022-10-24 DIAGNOSIS — R00.2 PALPITATIONS: ICD-10-CM

## 2022-10-24 DIAGNOSIS — I10 BENIGN ESSENTIAL HYPERTENSION: ICD-10-CM

## 2022-10-24 DIAGNOSIS — R10.13 EPIGASTRIC PAIN: ICD-10-CM

## 2022-10-24 DIAGNOSIS — R07.89 OTHER CHEST PAIN: Primary | ICD-10-CM

## 2022-10-24 DIAGNOSIS — K58.2 IRRITABLE BOWEL SYNDROME WITH BOTH CONSTIPATION AND DIARRHEA: ICD-10-CM

## 2022-10-24 DIAGNOSIS — M54.12 CERVICAL RADICULOPATHY: ICD-10-CM

## 2022-10-24 PROCEDURE — G8427 DOCREV CUR MEDS BY ELIG CLIN: HCPCS | Performed by: INTERNAL MEDICINE

## 2022-10-24 PROCEDURE — 1036F TOBACCO NON-USER: CPT | Performed by: INTERNAL MEDICINE

## 2022-10-24 PROCEDURE — 99213 OFFICE O/P EST LOW 20 MIN: CPT | Performed by: INTERNAL MEDICINE

## 2022-10-24 PROCEDURE — 3017F COLORECTAL CA SCREEN DOC REV: CPT | Performed by: INTERNAL MEDICINE

## 2022-10-24 PROCEDURE — 99214 OFFICE O/P EST MOD 30 MIN: CPT | Performed by: INTERNAL MEDICINE

## 2022-10-24 PROCEDURE — G8484 FLU IMMUNIZE NO ADMIN: HCPCS | Performed by: INTERNAL MEDICINE

## 2022-10-24 PROCEDURE — G8417 CALC BMI ABV UP PARAM F/U: HCPCS | Performed by: INTERNAL MEDICINE

## 2022-10-24 RX ORDER — METOPROLOL SUCCINATE 25 MG/1
25 TABLET, EXTENDED RELEASE ORAL DAILY
Qty: 90 TABLET | Refills: 1 | Status: SHIPPED | OUTPATIENT
Start: 2022-10-24

## 2022-10-24 RX ORDER — DICYCLOMINE HYDROCHLORIDE 10 MG/1
10 CAPSULE ORAL 4 TIMES DAILY PRN
Qty: 120 CAPSULE | Refills: 2 | Status: SHIPPED | OUTPATIENT
Start: 2022-10-24

## 2022-10-24 RX ORDER — METOPROLOL SUCCINATE 25 MG/1
25 TABLET, EXTENDED RELEASE ORAL DAILY
Qty: 90 TABLET | Refills: 1 | Status: CANCELLED | OUTPATIENT
Start: 2022-10-24

## 2022-10-24 RX ORDER — CYCLOBENZAPRINE HCL 10 MG
10 TABLET ORAL 3 TIMES DAILY PRN
Qty: 90 TABLET | Refills: 1 | Status: CANCELLED | OUTPATIENT
Start: 2022-10-24

## 2022-10-24 NOTE — PROGRESS NOTES
Cardiology Consultation/Follow Up. [address] Leonora Ramirez  1960  [id number]    Today: 10/24/22    CC: Patient is here for new consultation for chest pain    HPI:   Jose A Mon is here for new consultation for chest pain. She states that she gets a suction like pain in her chest.  Lasts a few seconds. Can occur both while at rest or with exertion. Gets an occasional palpitation. Denies any heaviness of the chest.  Denies any arm pain, jaw pain, nausea, vomiting, shortness of breath. She underwent a nuclear stress test results of which are below. She also underwent a Holter monitor results of which are below. Admits to occasional palpitations.      Past Medical:  Past Medical History:   Diagnosis Date    Anxiety     Asthma     Cerebral artery occlusion with cerebral infarction [facility]) 2009    Cerebrovascular disease     stroke     Depression     Diverticulitis     Fibromyalgia     History of small bowel obstruction     Hypertension     Lupus ([facility] [address]     Seizures ([facility] [address]  and          Past Surgical:  Past Surgical History:   Procedure Laterality Date    APPENDECTOMY       SECTION      x 4    COLONOSCOPY      nl    COLONOSCOPY  2015    1 bx    COLONOSCOPY N/A 2020    COLONOSCOPY performed by Tucker Torres MD at [address] 2021    COLONOSCOPY WITH BIOPSY performed by Tucker Torres MD at [address] ([address] [facility])      TAHBSO due to fibroids MMR    OTHER SURGICAL HISTORY Left 2016    C6 TFE    NH EGD TRANSORAL BIOPSY SINGLE/MULTIPLE N/A 2017    EGD BIOPSY performed by Tucker Torres MD at [address] EXTRACTION  3/1/16    16 removed on bottom 1 on top     TUBAL LIGATION      UPPER GASTROINTESTINAL ENDOSCOPY N/A 2021    EGD BIOPSY performed by Tucker Torres MD at [facility] OR         Family History:  Family History   Problem Relation Age of Onset Diabetes Mother         Passed due to DM at 62    Other Mother         enlarged heart    Dementia Father     Other Father         Brain tumor     Diabetes Maternal Aunt     Diabetes Maternal Uncle     Glaucoma Neg Hx        Social History:  Social History     Socioeconomic History    Marital status: Legally      Spouse name: Not on file    Number of children: Not on file    Years of education: Not on file    Highest education level: Not on file   Occupational History    Not on file   Tobacco Use    Smoking status: Former     Packs/day: 0.25     Years: 10.00     Pack years: 2.50     Types: Cigarettes     Quit date: 1993     Years since quittin.5    Smokeless tobacco: Never    Tobacco comments:     robinsonalkaitlynn rrt 3/12/17   Vaping Use    Vaping Use: Never used   Substance and Sexual Activity    Alcohol use: Yes     Alcohol/week: 0.0 standard drinks     Comment: has about 1 or 2 a year. very rarely    Drug use: No    Sexual activity: Never     Comment: not for two years   Other Topics Concern    Not on file   Social History Narrative    Not on file     Social Determinants of Health     Financial Resource Strain: Not on file   Food Insecurity: Not on file   Transportation Needs: Not on file   Physical Activity: Insufficiently Active    Days of Exercise per Week: 7 days    Minutes of Exercise per Session: 10 min   Stress: Not on file   Social Connections: Not on file   Intimate Partner Violence: Not on file   Housing Stability: Not on file        REVIEW OF SYSTEMS:    Constitutional: there has been no unanticipated weight loss. There's been No change in energy level, No change in activity level. Eyes: No visual changes or diplopia. No scleral icterus. ENT: No Headaches, hearing loss or vertigo. No mouth sores or sore throat. Cardiovascular: AS HPI  Respiratory: AS HPI  Gastrointestinal: No abdominal pain, appetite loss, blood in stools. No change in bowel or bladder habits.   Genitourinary: No dysuria, trouble voiding, or hematuria. Musculoskeletal:  No gait disturbance, No weakness or joint complaints. Integumentary: No rash or pruritis. Neurological: No headache, diplopia, change in muscle strength, numbness or tingling. No change in gait, balance, coordination, mood, affect, memory, mentation, behavior. Psychiatric: No new anxiety or depression. Endocrine: No temperature intolerance. No excessive thirst, fluid intake, or urination. No tremor. Hematologic/Lymphatic: No abnormal bruising or bleeding, blood clots or swollen lymph nodes. Allergic/Immunologic: No nasal congestion or hives. Medications:    Current Outpatient Medications:     Biotin 10 MG CAPS, Take by mouth, Disp: , Rfl:     vitamin B-12 (CYANOCOBALAMIN) 100 MCG tablet, Take 50 mcg by mouth daily, Disp: , Rfl:     amLODIPine (NORVASC) 10 MG tablet, Take 1 tablet by mouth daily, Disp: 30 tablet, Rfl: 2    phenol (CHLORASEPTIC MOUTH PAIN) 1.4 % LIQD mouth spray, Take 1 drop by mouth every 2 hours as needed for Sore Throat, Disp: 50 mL, Rfl: 0    cyclobenzaprine (FLEXERIL) 10 MG tablet, Take 1 tablet by mouth 3 times daily as needed for Muscle spasms, Disp: 90 tablet, Rfl: 1    UNABLE TO FIND, Handicap placard. Orthopedic limitations.  Expires 10/13/2026, Disp: 1 each, Rfl: 0    omeprazole (PRILOSEC) 20 MG delayed release capsule, Take 1 capsule by mouth every morning (before breakfast), Disp: 30 capsule, Rfl: 0    ferrous sulfate (FE TABS 325) 325 (65 Fe) MG EC tablet, Take 1 tablet by mouth daily (with breakfast), Disp: 30 tablet, Rfl: 0    vitamin D (ERGOCALCIFEROL) 1.25 MG (14849 UT) CAPS capsule, Take 1 capsule by mouth once a week, Disp: 12 capsule, Rfl: 0    dicyclomine (BENTYL) 10 MG capsule, Take 1 capsule by mouth 4 times daily as needed (Cramps/Abdominal Pain), Disp: 120 capsule, Rfl: 2    hyoscyamine (LEVBID) 375 MCG extended release tablet, Take 1 tablet by mouth every 12 hours as needed for Cramping, Disp: 60 tablet, Rfl: 3    MAGNESIUM PO, Take by mouth daily , Disp: , Rfl:     Handicap Placard [facility], by Does not apply route Issue parking placard for persons with disabilities. [facility] sec 4503.44. Expires in 5 years. Applicant meets the qualifying disability criteria., Disp: 1 each, Rfl: 0    diclofenac sodium 1 % GEL, Apply 2 g topically 4 times daily, Disp: 3 Tube, Rfl: 3    Multiple Vitamins-Minerals (MULTI-VITAMIN/MINERALS PO), Take 1 tablet by mouth 2 times daily, Disp: , Rfl:     Cholecalciferol (VITAMIN D3) 64391 units CAPS, Take 1 capsule by mouth daily, Disp: 30 capsule, Rfl: 5    docusate sodium (COLACE) 100 MG capsule, Take 1 capsule by mouth 2 times daily, Disp: 60 capsule, Rfl: 5    ketorolac (TORADOL) 10 MG tablet, Take 1 tablet by mouth 3 times daily as needed for Pain, Disp: 90 tablet, Rfl: 0     Physical Exam:   Vitals: BP (!) 144/83   Pulse (!) 107   Ht 5' (1.524 m)   Wt 173 lb (78.5 kg)   BMI 33.79 kg/m²   General appearance: alert and cooperative with exam  HEENT: Head: Normocephalic, no lesions, without obvious abnormality. Neck: no carotid bruit, no JVD  Lungs: clear to auscultation bilaterally  Heart:  regular rate and rhythm, S1, S2 normal, no Murmur  Abdomen: soft, non-tender; bowel sounds normal; no masses,  no organomegaly  Extremities: no site injection hematoma, extremities normal, atraumatic, no cyanosis.  no edema  Neurologic: Mental status: Alert, oriented, thought content appropriate    Labs:  Lab Results   Component Value Date    CHOL 211 (H) 09/18/2020    TRIG 275 (H) 09/18/2020    HDL 36 (L) 09/18/2020    LDLCHOLESTEROL 120 09/18/2020    VLDL NOT REPORTED (H) 09/18/2020    CHOLHDLRATIO 5.9 (H) 09/18/2020       Lab Results   Component Value Date     10/13/2021    K 3.8 10/13/2021     10/13/2021    CO2 22 10/13/2021    BUN 11 10/13/2021    CREATININE 0.73 10/13/2021    GLUCOSE 118 (H) 10/13/2021    CALCIUM 9.8 10/13/2021    PROT 7.7 10/13/2021    LABALBU 4.3 10/13/2021    BILITOT 0.17 (L) 10/13/2021    ALKPHOS 82 10/13/2021    AST 16 10/13/2021    ALT 12 10/13/2021    LABGLOM >60 10/13/2021    GFRAA >60 10/13/2021    GLOB 3.9 (H) 02/18/2021       EKG: NSR on stress 10/20/22    ECHO:   Results for orders placed or performed during the hospital encounter of 11/19/18   Echocardiogram complete 2D with doppler with color  ummary  Normal left ventricle size, wall thickness and function with an estimated EF > 55%. No segmental wall motion abnormalities seen. Grade I (mild) left ventricular diastolic dysfunction. Aortic valve is mildly sclerotic but opens well. Mildly thickened mitral valve leaflets. Trivial mitral regurgitation. Normal tricuspid valve leaflets. Mild tricuspid regurgitation. Estimated right ventricular systolic pressure is 16 mmHg. No significant pericardial effusion is seen. Holter on 9/22:      Stress test on 10/22:  1. No ischemia (reversible defect). 2.  No infarction (fixed defect). 3.  Normal LVEF. Past Medical and Surgical History, Problem List, Allergies, Medications, Labs, Imaging, all reviewed extensively in EMR and with the patient. Assessment:  - Chest pain- likely cardiac, suction type pain, lasts only few seconds, occurs at rest and with exertion with a low risk ecg and low risk stress test  - Palpitations- holter 9/22- rare pacs, HR fast 107  - HTN    Plan:  - will check 2d Echo to eval for structural heart disease  - start toprol 25 qd  - re eval in 1 month  - I instructed her that if her pain were to ever last longer than a few seconds, or become a heaviness, she is to go to the nearest ER. The patient is to continue heart healthy diet, weight loss and exercise as tolerated. Patient's medications and side effects were discussed. Medication refills were provided if needed. Follow up appointment timing was discussed. All questions and concerns were addressed to patient's satisfaction.      The patient is to follow up in 1 months or sooner if necessary. Thank you for allowing me to participate in the care of this patient, please do not hesitate to call if you have any questions. Rangel Whitehead DO, [address], [address], [address] [facility]  [facility]. [facility]  [id number]

## 2022-10-26 DIAGNOSIS — M54.12 CERVICAL RADICULOPATHY: ICD-10-CM

## 2022-10-27 RX ORDER — CYCLOBENZAPRINE HCL 10 MG
TABLET ORAL
Qty: 90 TABLET | Refills: 1 | Status: SHIPPED | OUTPATIENT
Start: 2022-10-27

## 2022-10-27 NOTE — TELEPHONE ENCOUNTER
Sophie Mcgarry called requesting a refill of the below medication which has been pended for you:     Requested Prescriptions     Pending Prescriptions Disp Refills    cyclobenzaprine (FLEXERIL) 10 MG tablet [Pharmacy Med Name: CYCLOBENZAPRINE 10 MG TABLET] 90 tablet 1     Sig: take 1 tablet by mouth three times a day if needed for muscle spasm       Last Appointment Date: 9/12/2022  Next Appointment Date: 3/13/2023    Allergies   Allergen Reactions    Latex Swelling     The powder in the gloves    Dilaudid [Hydromorphone Hcl] Nausea And Vomiting    Medrol [Methylprednisolone] Other (See Comments)     Back pain    Prednisone Nausea And Vomiting

## 2022-10-31 ENCOUNTER — HOSPITAL ENCOUNTER (OUTPATIENT)
Dept: NON INVASIVE DIAGNOSTICS | Age: 62
Discharge: HOME | End: 2022-10-31
Payer: MEDICARE

## 2022-10-31 DIAGNOSIS — R07.89 OTHER CHEST PAIN: ICD-10-CM

## 2022-10-31 LAB
LV EF: 60 %
LVEF MODALITY: NORMAL

## 2022-10-31 PROCEDURE — 93306 TTE W/DOPPLER COMPLETE: CPT

## 2022-11-14 ENCOUNTER — OFFICE VISIT (OUTPATIENT)
Dept: PRIMARY CARE CLINIC | Age: 62
End: 2022-11-14
Payer: MEDICARE

## 2022-11-14 VITALS
SYSTOLIC BLOOD PRESSURE: 120 MMHG | BODY MASS INDEX: 34.18 KG/M2 | OXYGEN SATURATION: 98 % | WEIGHT: 175 LBS | DIASTOLIC BLOOD PRESSURE: 74 MMHG | HEART RATE: 90 BPM | TEMPERATURE: 98.3 F

## 2022-11-14 DIAGNOSIS — J33.9 NASAL POLYP: ICD-10-CM

## 2022-11-14 DIAGNOSIS — M32.9 HX OF SYSTEMIC LUPUS ERYTHEMATOSUS (SLE) (HCC): ICD-10-CM

## 2022-11-14 DIAGNOSIS — J01.90 ACUTE BACTERIAL SINUSITIS: Primary | ICD-10-CM

## 2022-11-14 DIAGNOSIS — G50.0 TRIGEMINAL NEURALGIA OF RIGHT SIDE OF FACE: ICD-10-CM

## 2022-11-14 DIAGNOSIS — B96.89 ACUTE BACTERIAL SINUSITIS: Primary | ICD-10-CM

## 2022-11-14 DIAGNOSIS — H92.01 RIGHT EAR PAIN: ICD-10-CM

## 2022-11-14 PROCEDURE — G8427 DOCREV CUR MEDS BY ELIG CLIN: HCPCS | Performed by: NURSE PRACTITIONER

## 2022-11-14 PROCEDURE — 99212 OFFICE O/P EST SF 10 MIN: CPT | Performed by: NURSE PRACTITIONER

## 2022-11-14 PROCEDURE — 1036F TOBACCO NON-USER: CPT | Performed by: NURSE PRACTITIONER

## 2022-11-14 PROCEDURE — G8484 FLU IMMUNIZE NO ADMIN: HCPCS | Performed by: NURSE PRACTITIONER

## 2022-11-14 PROCEDURE — 3074F SYST BP LT 130 MM HG: CPT | Performed by: NURSE PRACTITIONER

## 2022-11-14 PROCEDURE — 3017F COLORECTAL CA SCREEN DOC REV: CPT | Performed by: NURSE PRACTITIONER

## 2022-11-14 PROCEDURE — 99213 OFFICE O/P EST LOW 20 MIN: CPT | Performed by: NURSE PRACTITIONER

## 2022-11-14 PROCEDURE — 3078F DIAST BP <80 MM HG: CPT | Performed by: NURSE PRACTITIONER

## 2022-11-14 PROCEDURE — G8417 CALC BMI ABV UP PARAM F/U: HCPCS | Performed by: NURSE PRACTITIONER

## 2022-11-14 RX ORDER — FLUTICASONE PROPIONATE 50 MCG
1 SPRAY, SUSPENSION (ML) NASAL 2 TIMES DAILY
Qty: 16 G | Refills: 0 | Status: SHIPPED | OUTPATIENT
Start: 2022-11-14

## 2022-11-14 RX ORDER — MUPIROCIN CALCIUM 20 MG/G
CREAM TOPICAL
Qty: 1 EACH | Refills: 0 | Status: SHIPPED | OUTPATIENT
Start: 2022-11-14 | End: 2022-12-14

## 2022-11-14 RX ORDER — AMOXICILLIN AND CLAVULANATE POTASSIUM 875; 125 MG/1; MG/1
1 TABLET, FILM COATED ORAL 2 TIMES DAILY
Qty: 20 TABLET | Refills: 0 | Status: SHIPPED | OUTPATIENT
Start: 2022-11-14 | End: 2022-11-24

## 2022-11-14 ASSESSMENT — ENCOUNTER SYMPTOMS
SINUS PAIN: 1
SINUS PRESSURE: 1
RESPIRATORY NEGATIVE: 1
EYE PAIN: 1
GASTROINTESTINAL NEGATIVE: 1
SORE THROAT: 0

## 2022-11-14 ASSESSMENT — PATIENT HEALTH QUESTIONNAIRE - PHQ9
1. LITTLE INTEREST OR PLEASURE IN DOING THINGS: 0
SUM OF ALL RESPONSES TO PHQ QUESTIONS 1-9: 0
2. FEELING DOWN, DEPRESSED OR HOPELESS: 0
SUM OF ALL RESPONSES TO PHQ9 QUESTIONS 1 & 2: 0
SUM OF ALL RESPONSES TO PHQ QUESTIONS 1-9: 0

## 2022-11-14 NOTE — PATIENT INSTRUCTIONS
Advised to stop placing cotton balls up into the nose. May use mupirocin ointment in the lower nasal opening with a Q-tip but avoid placing objects up into the nose. Follow up with ENT. Use saline rinse and if cold air bothers you then try to use a mask or scarf.

## 2022-11-14 NOTE — PROGRESS NOTES
[facility] A department of [facility]  [facility]  Phone: [phone]  Fax: [phone]      Maryuri Tillman is a 57 y.o. female who presents to the [facility] or [address] [place] [address] clinic today for her medical conditions/complaints as noted below. Maryuri Tillman is c/o of Nasal Pain (Ear ache pain, feels as if ulcer in nose infected Hx of lupus )      HPI:     Nose ulcers are a chronic issue due to her lupus. Chronic issues with left side. Right side of the nose has had pain that has been worse since Friday. Takes tylenol and flexeril for pain. Regularly uses neosporin in nose to help with ulcers. Pain goes from nose/eye to the ear. Past Medical History:   Past Medical History:   Diagnosis Date    Anxiety     Asthma     Cerebral artery occlusion with cerebral infarction [facility])     Cerebrovascular disease     stroke     Depression     Diverticulitis     Fibromyalgia     History of small bowel obstruction     Hypertension     Lupus ([facility] [address]     Seizures ([facility] [address]  and           Past Surgical History:  has a past surgical history that includes Appendectomy;  section; Hysterectomy (); Dilation and curettage of uterus; Tubal ligation; Colonoscopy (); Colonoscopy (2015); Tooth Extraction (3/1/16); other surgical history (Left, 2016); pr egd transoral biopsy single/multiple (N/A, 2017); Colonoscopy (N/A, 2020); Colonoscopy (N/A, 2021); and Upper gastrointestinal endoscopy (N/A, 2021). Allergies: Allergies   Allergen Reactions    Latex Swelling     The powder in the gloves    Dilaudid [Hydromorphone Hcl] Nausea And Vomiting    Medrol [Methylprednisolone] Other (See Comments)     Back pain    Prednisone Nausea And Vomiting         Social History:  reports that she quit smoking about 29 years ago. Her smoking use included cigarettes. She has a 2.50 pack-year smoking history.  She has never used impacted cerumen. Left Ear: Tympanic membrane, ear canal and external ear normal. There is no impacted cerumen. Nose: Nasal tenderness and mucosal edema present. No congestion or rhinorrhea. Right Nostril: Foreign body present. No occlusion. Left Nostril: No occlusion. Right Turbinates: Swollen. Left Turbinates: Swollen. Right Sinus: Maxillary sinus tenderness present. Left Sinus: Maxillary sinus tenderness present. Comments: Patient had large piece of cotton that was up into the sinuses that required alligator clamp to remove. Appears to have a possible nasal polyp of the right nares that has some irritation in that there appears thick white mucus to the area. This could be from frequent application of neosporin and packing nose with large amount of cotton. Mouth/Throat:      Lips: Pink. Mouth: Mucous membranes are moist.      Pharynx: Uvula midline. No oropharyngeal exudate or posterior oropharyngeal erythema. Tonsils: No tonsillar exudate or tonsillar abscesses. Eyes:      Comments: Right eye has discomfort with movement toward the right or outer corner of the eye. No discomfort with up/down or towards the nose. Pulmonary:      Effort: Pulmonary effort is normal.      Breath sounds: Normal breath sounds. No decreased air movement or transmitted upper airway sounds. No decreased breath sounds, wheezing, rhonchi or rales. Psychiatric:         Attention and Perception: Attention normal.         Mood and Affect: Mood normal.         Speech: Speech normal.       Assessment and Plan      Diagnosis Orders   1. Acute bacterial sinusitis  BEBE Andrade MD, Otolaryngology, [address]      2. Nasal polyp  BEBE Andrade MD, Otolaryngology, [address]      3. Right ear pain  BEBE Andrade MD, Otolaryngology, [address]      4. Trigeminal neuralgia of right side of face        5.  Hx of systemic lupus erythematosus (SLE) ([facility] [address]  BEBE Chin Ray Guzman MD, Otolaryngology, [address] This Encounter    Lamar Vigil MD, Otolaryngology, [place]     Referral Priority:   Routine     Referral Type:   Eval and Treat     Referral Reason:   Specialty Services Required     Referred to Provider:   Samantha Bhat MD     Requested Specialty:   Otolaryngology     Number of Visits Requested:   1    amoxicillin-clavulanate (AUGMENTIN) 875-125 MG per tablet     Sig: Take 1 tablet by mouth 2 times daily for 10 days     Dispense:  20 tablet     Refill:  0    fluticasone (FLONASE) 50 MCG/ACT nasal spray     Si spray by Each Nostril route in the morning and 1 spray in the evening. Dispense:  16 g     Refill:  0    mupirocin (BACTROBAN) 2 % cream     Sig: Apply 3 times daily. Dispense:  1 each     Refill:  0   Will treat for sinus infection. Pain radiates from nose along the upper cheek to the right ear. This could be a flare of trigeminal neuralgia as well. She is allergic to prednisone and is not able to take NSAIDs due to ulcer. Recommended trial of flonase and saline sinus rinses. Augmentin BID for 10 days to treat bacterial sinusitis. Advised to stop placing cotton balls up into the nose. May use mupirocin ointment in the lower nasal opening with a Q-tip but avoid placing objects up into the nose. Referred to ENT and she was encouraged to Follow up with ENT. Use saline rinse and if cold air bothers you then try to use a mask or scarf. Discussed exam, POCT findings, plan of care, and follow-up at length with patient. Reviewed all prescribed and recommended medications, administration and side effects. Encouraged patient to follow up with PCP or return to the clinic for no improvement and or worsening of symptoms. All questions were answered and they verbalized understanding and were agreeable with the plan. Follow up as needed.       Electronically signed by MAX Leblanc CNP on 2022 at 2:06 PM

## 2022-11-21 ENCOUNTER — OFFICE VISIT (OUTPATIENT)
Dept: CARDIOLOGY | Age: 62
End: 2022-11-21
Payer: MEDICARE

## 2022-11-21 VITALS
HEART RATE: 105 BPM | SYSTOLIC BLOOD PRESSURE: 130 MMHG | BODY MASS INDEX: 34.53 KG/M2 | WEIGHT: 176.8 LBS | DIASTOLIC BLOOD PRESSURE: 72 MMHG

## 2022-11-21 DIAGNOSIS — R07.89 OTHER CHEST PAIN: Primary | ICD-10-CM

## 2022-11-21 DIAGNOSIS — R00.2 PALPITATIONS: ICD-10-CM

## 2022-11-21 PROCEDURE — 3017F COLORECTAL CA SCREEN DOC REV: CPT | Performed by: NURSE PRACTITIONER

## 2022-11-21 PROCEDURE — G8427 DOCREV CUR MEDS BY ELIG CLIN: HCPCS | Performed by: NURSE PRACTITIONER

## 2022-11-21 PROCEDURE — 93010 ELECTROCARDIOGRAM REPORT: CPT | Performed by: NURSE PRACTITIONER

## 2022-11-21 PROCEDURE — 3074F SYST BP LT 130 MM HG: CPT | Performed by: NURSE PRACTITIONER

## 2022-11-21 PROCEDURE — G8484 FLU IMMUNIZE NO ADMIN: HCPCS | Performed by: NURSE PRACTITIONER

## 2022-11-21 PROCEDURE — 99212 OFFICE O/P EST SF 10 MIN: CPT | Performed by: NURSE PRACTITIONER

## 2022-11-21 PROCEDURE — 99214 OFFICE O/P EST MOD 30 MIN: CPT | Performed by: NURSE PRACTITIONER

## 2022-11-21 PROCEDURE — 93005 ELECTROCARDIOGRAM TRACING: CPT | Performed by: NURSE PRACTITIONER

## 2022-11-21 PROCEDURE — 1036F TOBACCO NON-USER: CPT | Performed by: NURSE PRACTITIONER

## 2022-11-21 PROCEDURE — 3078F DIAST BP <80 MM HG: CPT | Performed by: NURSE PRACTITIONER

## 2022-11-21 PROCEDURE — G8417 CALC BMI ABV UP PARAM F/U: HCPCS | Performed by: NURSE PRACTITIONER

## 2022-11-21 NOTE — PROGRESS NOTES
Cardiology Consultation/Follow Up. [address] Leonora Ramirez  1960  [id number]    Today: 22    CC: Patient is here for new consultation for chest pain    HPI:   Rosibel Patel is here for follow-up. States right now she is having some inner ear issues and congestion. States she saw someone in the urgent care and was supposed to f/u with ENT which she thought this was that appointment. She states she still has the intermittent episodes of \"a suction pressure in the middle of my chest.\" States this occurs \"randomly and without warning and will be 2-3 sec and then go away. \" She states it is \"not a pain at all. \" States compliance with all of her medications. Denies any SOB/VALDOVINOS. Very frustrated with her \"ear issues. \"    Seen & examined in room.      Past Medical:  Past Medical History:   Diagnosis Date    Anxiety     Asthma     Cerebral artery occlusion with cerebral infarction [facility])     Cerebrovascular disease     stroke     Depression     Diverticulitis     Fibromyalgia     History of small bowel obstruction     Hypertension     Lupus ([facility] [address]     Seizures ([facility] [address]  and          Past Surgical:  Past Surgical History:   Procedure Laterality Date    APPENDECTOMY       SECTION      x 4    COLONOSCOPY  2007    nl    COLONOSCOPY  2015    1 bx    COLONOSCOPY N/A 2020    COLONOSCOPY performed by Ronan Au MD at [address] 2021    COLONOSCOPY WITH BIOPSY performed by Ronan Au MD at [address] ([facility])      TAHBSO due to fibroids MMR    OTHER SURGICAL HISTORY Left 2016    C6 TFE    NC EGD TRANSORAL BIOPSY SINGLE/MULTIPLE N/A 2017    EGD BIOPSY performed by Ronan Au MD at [address] EXTRACTION  3/1/16    16 removed on bottom 1 on top     TUBAL LIGATION      UPPER GASTROINTESTINAL ENDOSCOPY N/A 2021    EGD BIOPSY performed by Neel Salomon Sofia Salazar MD at [facility] OR         Family History:  Family History   Problem Relation Age of Onset    Diabetes Mother         Passed due to DM at 62    Other Mother         enlarged heart    Dementia Father     Other Father         Brain tumor     Diabetes Maternal Aunt     Diabetes Maternal Uncle     Glaucoma Neg Hx        Social History:  Social History     Socioeconomic History    Marital status: Legally      Spouse name: Not on file    Number of children: Not on file    Years of education: Not on file    Highest education level: Not on file   Occupational History    Not on file   Tobacco Use    Smoking status: Former     Packs/day: 0.25     Years: 10.00     Pack years: 2.50     Types: Cigarettes     Quit date: 1993     Years since quittin.5    Smokeless tobacco: Never    Tobacco comments:     cwaltmire rrt 3/12/17   Vaping Use    Vaping Use: Never used   Substance and Sexual Activity    Alcohol use: Yes     Alcohol/week: 0.0 standard drinks     Comment: has about 1 or 2 a year. very rarely    Drug use: No    Sexual activity: Never     Comment: not for two years   Other Topics Concern    Not on file   Social History Narrative    Not on file     Social Determinants of Health     Financial Resource Strain: Not on file   Food Insecurity: Not on file   Transportation Needs: Not on file   Physical Activity: Insufficiently Active    Days of Exercise per Week: 7 days    Minutes of Exercise per Session: 10 min   Stress: Not on file   Social Connections: Not on file   Intimate Partner Violence: Not on file   Housing Stability: Not on file        REVIEW OF SYSTEMS:    Constitutional: there has been no unanticipated weight loss. There's been No change in energy level, No change in activity level. Eyes: No visual changes or diplopia. No scleral icterus. ENT: No Headaches, hearing loss or vertigo. No mouth sores or sore throat.   Cardiovascular: AS HPI  Respiratory: AS HPI  Gastrointestinal: No abdominal pain, appetite loss, blood in stools. No change in bowel or bladder habits. Genitourinary: No dysuria, trouble voiding, or hematuria. Musculoskeletal:  No gait disturbance, No weakness or joint complaints. Integumentary: No rash or pruritis. Neurological: No headache, diplopia, change in muscle strength, numbness or tingling. No change in gait, balance, coordination, mood, affect, memory, mentation, behavior. Psychiatric: No new anxiety or depression. Endocrine: No temperature intolerance. No excessive thirst, fluid intake, or urination. No tremor. Hematologic/Lymphatic: No abnormal bruising or bleeding, blood clots or swollen lymph nodes. Allergic/Immunologic: No nasal congestion or hives. Medications:    Current Outpatient Medications:     amoxicillin-clavulanate (AUGMENTIN) 875-125 MG per tablet, Take 1 tablet by mouth 2 times daily for 10 days, Disp: 20 tablet, Rfl: 0    fluticasone (FLONASE) 50 MCG/ACT nasal spray, 1 spray by Each Nostril route in the morning and 1 spray in the evening., Disp: 16 g, Rfl: 0    mupirocin (BACTROBAN) 2 % cream, Apply 3 times daily. , Disp: 1 each, Rfl: 0    cyclobenzaprine (FLEXERIL) 10 MG tablet, take 1 tablet by mouth three times a day if needed for muscle spasm, Disp: 90 tablet, Rfl: 1    metoprolol succinate (TOPROL XL) 25 MG extended release tablet, Take 1 tablet by mouth daily, Disp: 90 tablet, Rfl: 1    dicyclomine (BENTYL) 10 MG capsule, Take 1 capsule by mouth 4 times daily as needed (Cramps/Abdominal Pain), Disp: 120 capsule, Rfl: 2    ketorolac (TORADOL) 10 MG tablet, Take 1 tablet by mouth 3 times daily as needed for Pain, Disp: 90 tablet, Rfl: 0    Biotin 10 MG CAPS, Take by mouth, Disp: , Rfl:     vitamin B-12 (CYANOCOBALAMIN) 100 MCG tablet, Take 50 mcg by mouth daily, Disp: , Rfl:     amLODIPine (NORVASC) 10 MG tablet, Take 1 tablet by mouth daily, Disp: 30 tablet, Rfl: 2    phenol (CHLORASEPTIC MOUTH PAIN) 1.4 % LIQD mouth spray, Take 1 drop by mouth every 2 hours as needed for Sore Throat, Disp: 50 mL, Rfl: 0    UNABLE TO FIND, Handicap placard. Orthopedic limitations. Expires 10/13/2026, Disp: 1 each, Rfl: 0    omeprazole (PRILOSEC) 20 MG delayed release capsule, Take 1 capsule by mouth every morning (before breakfast), Disp: 30 capsule, Rfl: 0    ferrous sulfate (FE TABS 325) 325 (65 Fe) MG EC tablet, Take 1 tablet by mouth daily (with breakfast), Disp: 30 tablet, Rfl: 0    vitamin D (ERGOCALCIFEROL) 1.25 MG (58025 UT) CAPS capsule, Take 1 capsule by mouth once a week, Disp: 12 capsule, Rfl: 0    hyoscyamine (LEVBID) 375 MCG extended release tablet, Take 1 tablet by mouth every 12 hours as needed for Cramping, Disp: 60 tablet, Rfl: 3    MAGNESIUM PO, Take by mouth daily , Disp: , Rfl:     Handicap Placard [facility], by Does not apply route Issue parking placard for persons with disabilities. [facility] sec 4503.44. Expires in 5 years. Applicant meets the qualifying disability criteria., Disp: 1 each, Rfl: 0    diclofenac sodium 1 % GEL, Apply 2 g topically 4 times daily, Disp: 3 Tube, Rfl: 3    Multiple Vitamins-Minerals (MULTI-VITAMIN/MINERALS PO), Take 1 tablet by mouth 2 times daily, Disp: , Rfl:     Cholecalciferol (VITAMIN D3) 97558 units CAPS, Take 1 capsule by mouth daily, Disp: 30 capsule, Rfl: 5    docusate sodium (COLACE) 100 MG capsule, Take 1 capsule by mouth 2 times daily, Disp: 60 capsule, Rfl: 5     Physical Exam:   Vitals: There were no vitals taken for this visit. General appearance: alert and cooperative with exam  HEENT: Head: Normocephalic, no lesions, without obvious abnormality. Neck: no carotid bruit, no JVD  Lungs: clear to auscultation bilaterally  Heart:  regular rate and rhythm, S1, S2 normal, no Murmur  Abdomen: soft, non-tender; bowel sounds normal; no masses,  no organomegaly  Extremities: no site injection hematoma, extremities normal, atraumatic, no cyanosis.  no edema  Neurologic: Mental status: Alert, oriented, thought content appropriate    Labs:  Lab Results   Component Value Date    CHOL 211 (H) 09/18/2020    TRIG 275 (H) 09/18/2020    HDL 36 (L) 09/18/2020    LDLCHOLESTEROL 120 09/18/2020    VLDL NOT REPORTED (H) 09/18/2020    CHOLHDLRATIO 5.9 (H) 09/18/2020       Lab Results   Component Value Date     10/13/2021    K 3.8 10/13/2021     10/13/2021    CO2 22 10/13/2021    BUN 11 10/13/2021    CREATININE 0.73 10/13/2021    GLUCOSE 118 (H) 10/13/2021    CALCIUM 9.8 10/13/2021    PROT 7.7 10/13/2021    LABALBU 4.3 10/13/2021    BILITOT 0.17 (L) 10/13/2021    ALKPHOS 82 10/13/2021    AST 16 10/13/2021    ALT 12 10/13/2021    LABGLOM >60 10/13/2021    GFRAA >60 10/13/2021    GLOB 3.9 (H) 02/18/2021       EKG: NSR on stress 10/20/22    ECHO:   Results for orders placed or performed during the hospital encounter of 11/19/18   Echocardiogram complete 2D with doppler with color  ummary  Normal left ventricle size, wall thickness and function with an estimated EF > 55%. No segmental wall motion abnormalities seen. Grade I (mild) left ventricular diastolic dysfunction. Aortic valve is mildly sclerotic but opens well. Mildly thickened mitral valve leaflets. Trivial mitral regurgitation. Normal tricuspid valve leaflets. Mild tricuspid regurgitation. Estimated right ventricular systolic pressure is 16 mmHg. No significant pericardial effusion is seen. Holter on 9/22:      Stress test on 10/22:  1. No ischemia (reversible defect). 2.  No infarction (fixed defect). 3.  Normal LVEF. Echo 10/24/22  Summary  Normal left ventricular diameter. Left ventricular systolic function is normal.  Left ventricular ejection fraction 60 %. No doppler evidence of diastolic dysfunction. No significant valvular regurgitation or stenosis seen. No pericardial effusion. Past Medical and Surgical History, Problem List, Allergies, Medications, Labs, Imaging, all reviewed extensively in EMR and with the patient.     Assessment:  - Atypical chest \"suction\" feeling  - Palpitations- holter 9/22- rare pacs, HR fastest 107  - HTN    Plan:  - Stress & echo reviewed as above along with ECG all of which are low risk. Discussed in detail with patient. Questions/concerns addressed. Advised to f/u with PCP for further possible GI/pulmonary eval of symptoms.   - BP stable. Continue PO BB & Norvasc along with ambulatory BP monitoring.   - hx of palpitations with holter showing rare PVCs. Continue BB and discussed avoiding exacerbating/contributing factors. She states she was in a rush this AM, got held up at the gas station getting her coffee, and is just in a fussle this AM.      F/U with us as needed or annually as needed. F/U PCP     Ila Garrett, APRN - [facility]  [facility]  [id number]

## 2023-03-13 ENCOUNTER — TELEPHONE (OUTPATIENT)
Dept: FAMILY MEDICINE CLINIC | Age: 63
End: 2023-03-13

## 2023-03-13 NOTE — TELEPHONE ENCOUNTER
Attempted to reach patient regarding missed appointment on 3/13/23. Unable to contact at this time. Left message to reschedule.

## 2023-08-16 DIAGNOSIS — M54.12 CERVICAL RADICULOPATHY: ICD-10-CM

## 2023-08-16 RX ORDER — METOPROLOL SUCCINATE 25 MG/1
TABLET, EXTENDED RELEASE ORAL
Qty: 90 TABLET | Refills: 1 | OUTPATIENT
Start: 2023-08-16

## 2023-08-16 RX ORDER — CYCLOBENZAPRINE HCL 10 MG
TABLET ORAL
Qty: 90 TABLET | Refills: 1 | OUTPATIENT
Start: 2023-08-16

## 2023-08-16 NOTE — TELEPHONE ENCOUNTER
Marco Antonio Armstrong called requesting a refill of the below medication which has been pended for you:     Requested Prescriptions     Pending Prescriptions Disp Refills    cyclobenzaprine (FLEXERIL) 10 MG tablet [Pharmacy Med Name: CYCLOBENZAPRINE 10 MG TABLET] 90 tablet 1     Sig: take 1 tablet by mouth three times a day if needed for muscle spasm    metoprolol succinate (TOPROL XL) 25 MG extended release tablet [Pharmacy Med Name: METOPROLOL SUCC ER 25 MG TAB] 90 tablet 1     Sig: take 1 tablet by mouth once daily       Last Appointment Date: 9/12/2022  Next Appointment Date: Visit date not found    Allergies   Allergen Reactions    Latex Swelling     The powder in the gloves    Dilaudid [Hydromorphone Hcl] Nausea And Vomiting    Medrol [Methylprednisolone] Other (See Comments)     Back pain    Prednisone Nausea And Vomiting

## 2023-08-17 RX ORDER — METOPROLOL SUCCINATE 25 MG/1
TABLET, EXTENDED RELEASE ORAL
Qty: 90 TABLET | Refills: 1 | Status: SHIPPED | OUTPATIENT
Start: 2023-08-17

## 2023-08-23 DIAGNOSIS — M54.12 CERVICAL RADICULOPATHY: ICD-10-CM

## 2023-08-23 NOTE — TELEPHONE ENCOUNTER
Zara Siu called requesting a refill of the below medication which has been pended for you:     Requested Prescriptions     Pending Prescriptions Disp Refills    cyclobenzaprine (FLEXERIL) 10 MG tablet 90 tablet 0     Sig: Take 1 tablet by mouth 3 times daily as needed for Muscle spasms       Last Appointment Date: 9/12/2022  Next Appointment Date: 9/8/2023    Allergies   Allergen Reactions    Latex Swelling     The powder in the gloves    Dilaudid [Hydromorphone Hcl] Nausea And Vomiting    Medrol [Methylprednisolone] Other (See Comments)     Back pain    Prednisone Nausea And Vomiting

## 2023-08-24 RX ORDER — CYCLOBENZAPRINE HCL 10 MG
10 TABLET ORAL 3 TIMES DAILY PRN
Qty: 90 TABLET | Refills: 0 | Status: SHIPPED | OUTPATIENT
Start: 2023-08-24

## 2023-09-08 ENCOUNTER — HOSPITAL ENCOUNTER (OUTPATIENT)
Age: 63
Discharge: HOME | End: 2023-09-08
Payer: MEDICARE

## 2023-09-08 ENCOUNTER — OFFICE VISIT (OUTPATIENT)
Dept: FAMILY MEDICINE CLINIC | Age: 63
End: 2023-09-08
Payer: MEDICARE

## 2023-09-08 VITALS
DIASTOLIC BLOOD PRESSURE: 78 MMHG | OXYGEN SATURATION: 97 % | SYSTOLIC BLOOD PRESSURE: 130 MMHG | BODY MASS INDEX: 34.16 KG/M2 | HEART RATE: 110 BPM | HEIGHT: 60 IN | WEIGHT: 174 LBS

## 2023-09-08 DIAGNOSIS — H65.93 OTITIS MEDIA WITH EFFUSION, BILATERAL: ICD-10-CM

## 2023-09-08 DIAGNOSIS — D50.9 IRON DEFICIENCY ANEMIA, UNSPECIFIED IRON DEFICIENCY ANEMIA TYPE: ICD-10-CM

## 2023-09-08 DIAGNOSIS — K58.2 IRRITABLE BOWEL SYNDROME WITH BOTH CONSTIPATION AND DIARRHEA: ICD-10-CM

## 2023-09-08 DIAGNOSIS — R00.2 PALPITATIONS: ICD-10-CM

## 2023-09-08 DIAGNOSIS — R73.9 ELEVATED BLOOD SUGAR: ICD-10-CM

## 2023-09-08 DIAGNOSIS — K52.9 COLITIS: Primary | ICD-10-CM

## 2023-09-08 DIAGNOSIS — R07.9 CHEST PAIN, UNSPECIFIED TYPE: ICD-10-CM

## 2023-09-08 DIAGNOSIS — I10 ESSENTIAL HYPERTENSION, BENIGN: ICD-10-CM

## 2023-09-08 DIAGNOSIS — H93.13 TINNITUS OF BOTH EARS: ICD-10-CM

## 2023-09-08 LAB
ALBUMIN SERPL-MCNC: 4.4 G/DL (ref 3.5–5.2)
ALBUMIN/GLOB SERPL: 1.2 {RATIO} (ref 1–2.5)
ALP SERPL-CCNC: 77 U/L (ref 35–104)
ALT SERPL-CCNC: 11 U/L (ref 5–33)
ANION GAP SERPL CALCULATED.3IONS-SCNC: 10 MMOL/L (ref 9–17)
AST SERPL-CCNC: 16 U/L
BASOPHILS # BLD: 0.03 K/UL (ref 0–0.2)
BASOPHILS NFR BLD: 0 % (ref 0–2)
BILIRUB SERPL-MCNC: 0.2 MG/DL (ref 0.3–1.2)
BUN SERPL-MCNC: 13 MG/DL (ref 8–23)
BUN/CREAT SERPL: 13 (ref 9–20)
CALCIUM SERPL-MCNC: 9.7 MG/DL (ref 8.6–10.4)
CHLORIDE SERPL-SCNC: 103 MMOL/L (ref 98–107)
CO2 SERPL-SCNC: 27 MMOL/L (ref 20–31)
CREAT SERPL-MCNC: 1 MG/DL (ref 0.5–0.9)
EOSINOPHIL # BLD: 0.26 K/UL (ref 0–0.44)
EOSINOPHILS RELATIVE PERCENT: 4 % (ref 1–4)
ERYTHROCYTE [DISTWIDTH] IN BLOOD BY AUTOMATED COUNT: 14 % (ref 11.8–14.4)
GFR SERPL CREATININE-BSD FRML MDRD: >60 ML/MIN/1.73M2
GLUCOSE SERPL-MCNC: 96 MG/DL (ref 70–99)
HCT VFR BLD AUTO: 40.5 % (ref 36.3–47.1)
HGB BLD-MCNC: 13 G/DL (ref 11.9–15.1)
IMM GRANULOCYTES # BLD AUTO: 0.03 K/UL (ref 0–0.3)
IMM GRANULOCYTES NFR BLD: 0 %
LYMPHOCYTES NFR BLD: 1.84 K/UL (ref 1.1–3.7)
LYMPHOCYTES RELATIVE PERCENT: 25 % (ref 24–43)
MCH RBC QN AUTO: 27.8 PG (ref 25.2–33.5)
MCHC RBC AUTO-ENTMCNC: 32.1 G/DL (ref 25.2–33.5)
MCV RBC AUTO: 86.7 FL (ref 82.6–102.9)
MONOCYTES NFR BLD: 0.62 K/UL (ref 0.1–1.2)
MONOCYTES NFR BLD: 9 % (ref 3–12)
NEUTROPHILS NFR BLD: 62 % (ref 36–65)
NEUTS SEG NFR BLD: 4.55 K/UL (ref 1.5–8.1)
NRBC BLD-RTO: 0 PER 100 WBC
PLATELET # BLD AUTO: 418 K/UL (ref 138–453)
PMV BLD AUTO: 9.9 FL (ref 8.1–13.5)
POTASSIUM SERPL-SCNC: 4.2 MMOL/L (ref 3.7–5.3)
PROT SERPL-MCNC: 8 G/DL (ref 6.4–8.3)
RBC # BLD AUTO: 4.67 M/UL (ref 3.95–5.11)
SODIUM SERPL-SCNC: 140 MMOL/L (ref 135–144)
TSH SERPL DL<=0.05 MIU/L-ACNC: 1.27 UIU/ML (ref 0.3–5)
WBC OTHER # BLD: 7.3 K/UL (ref 3.5–11.3)

## 2023-09-08 PROCEDURE — 83036 HEMOGLOBIN GLYCOSYLATED A1C: CPT

## 2023-09-08 PROCEDURE — 36415 COLL VENOUS BLD VENIPUNCTURE: CPT

## 2023-09-08 PROCEDURE — 80061 LIPID PANEL: CPT

## 2023-09-08 PROCEDURE — 3074F SYST BP LT 130 MM HG: CPT | Performed by: NURSE PRACTITIONER

## 2023-09-08 PROCEDURE — 80053 COMPREHEN METABOLIC PANEL: CPT

## 2023-09-08 PROCEDURE — 84443 ASSAY THYROID STIM HORMONE: CPT

## 2023-09-08 PROCEDURE — 82607 VITAMIN B-12: CPT

## 2023-09-08 PROCEDURE — 99214 OFFICE O/P EST MOD 30 MIN: CPT

## 2023-09-08 PROCEDURE — 1124F ACP DISCUSS-NO DSCNMKR DOCD: CPT | Performed by: NURSE PRACTITIONER

## 2023-09-08 PROCEDURE — 3078F DIAST BP <80 MM HG: CPT | Performed by: NURSE PRACTITIONER

## 2023-09-08 PROCEDURE — 85025 COMPLETE CBC W/AUTO DIFF WBC: CPT

## 2023-09-08 PROCEDURE — 99214 OFFICE O/P EST MOD 30 MIN: CPT | Performed by: NURSE PRACTITIONER

## 2023-09-08 RX ORDER — PSEUDOEPHEDRINE HYDROCHLORIDE 60 MG/1
60 TABLET, FILM COATED ORAL 2 TIMES DAILY
Qty: 14 TABLET | Refills: 0 | Status: SHIPPED | OUTPATIENT
Start: 2023-09-08 | End: 2023-09-15

## 2023-09-08 RX ORDER — DEXAMETHASONE 6 MG/1
6 TABLET ORAL ONCE
Qty: 1 TABLET | Refills: 0 | Status: SHIPPED | OUTPATIENT
Start: 2023-09-08 | End: 2023-09-08

## 2023-09-08 RX ORDER — AMOXICILLIN 500 MG/1
500 CAPSULE ORAL 2 TIMES DAILY
Qty: 14 CAPSULE | Refills: 0 | Status: SHIPPED | OUTPATIENT
Start: 2023-09-08 | End: 2023-09-15

## 2023-09-08 SDOH — ECONOMIC STABILITY: HOUSING INSECURITY
IN THE LAST 12 MONTHS, WAS THERE A TIME WHEN YOU DID NOT HAVE A STEADY PLACE TO SLEEP OR SLEPT IN A SHELTER (INCLUDING NOW)?: NO

## 2023-09-08 SDOH — ECONOMIC STABILITY: FOOD INSECURITY: WITHIN THE PAST 12 MONTHS, YOU WORRIED THAT YOUR FOOD WOULD RUN OUT BEFORE YOU GOT MONEY TO BUY MORE.: NEVER TRUE

## 2023-09-08 SDOH — ECONOMIC STABILITY: FOOD INSECURITY: WITHIN THE PAST 12 MONTHS, THE FOOD YOU BOUGHT JUST DIDN'T LAST AND YOU DIDN'T HAVE MONEY TO GET MORE.: NEVER TRUE

## 2023-09-08 SDOH — ECONOMIC STABILITY: INCOME INSECURITY: HOW HARD IS IT FOR YOU TO PAY FOR THE VERY BASICS LIKE FOOD, HOUSING, MEDICAL CARE, AND HEATING?: SOMEWHAT HARD

## 2023-09-08 ASSESSMENT — ENCOUNTER SYMPTOMS: BLOOD IN STOOL: 1

## 2023-09-08 NOTE — PROGRESS NOTES
Gastroenterology     Referral Priority:   Routine     Referral Type:   Eval and Treat     Referral Reason:   Specialty Services Required     Referred to Provider:   Lynda Del Valle MD     Requested Specialty:   Gastroenterology     Number of Visits Requested:   1      Outpatient Encounter Medications as of 2023   Medication Sig Dispense Refill    amoxicillin (AMOXIL) 500 MG capsule Take 1 capsule by mouth 2 times daily for 7 days 14 capsule 0    [] dexamethasone (DECADRON) 6 MG tablet Take 1 tablet by mouth once for 1 dose 1 tablet 0    pseudoephedrine (SUDAFED) 60 MG tablet Take 1 tablet by mouth in the morning and at bedtime for 7 days 14 tablet 0    cyclobenzaprine (FLEXERIL) 10 MG tablet Take 1 tablet by mouth 3 times daily as needed for Muscle spasms 90 tablet 0    metoprolol succinate (TOPROL XL) 25 MG extended release tablet take 1 tablet by mouth once daily 90 tablet 1    fluticasone (FLONASE) 50 MCG/ACT nasal spray 1 spray by Each Nostril route in the morning and 1 spray in the evening. 16 g 0    dicyclomine (BENTYL) 10 MG capsule Take 1 capsule by mouth 4 times daily as needed (Cramps/Abdominal Pain) 120 capsule 2    ketorolac (TORADOL) 10 MG tablet Take 1 tablet by mouth 3 times daily as needed for Pain 90 tablet 0    Biotin 10 MG CAPS Take by mouth      vitamin B-12 (CYANOCOBALAMIN) 100 MCG tablet Take 50 mcg by mouth daily      amLODIPine (NORVASC) 10 MG tablet Take 1 tablet by mouth daily 30 tablet 2    phenol (CHLORASEPTIC MOUTH PAIN) 1.4 % LIQD mouth spray Take 1 drop by mouth every 2 hours as needed for Sore Throat 50 mL 0    UNABLE TO FIND Handicap placard. Orthopedic limitations.  Expires 10/13/2026 1 each 0    omeprazole (PRILOSEC) 20 MG delayed release capsule Take 1 capsule by mouth every morning (before breakfast) 30 capsule 0    ferrous sulfate (FE TABS 325) 325 (65 Fe) MG EC tablet Take 1 tablet by mouth daily (with breakfast) 30 tablet 0    vitamin D (ERGOCALCIFEROL) 1.25 MG

## 2023-09-09 LAB
CHOLEST SERPL-MCNC: 212 MG/DL
CHOLESTEROL/HDL RATIO: 6.6
HDLC SERPL-MCNC: 32 MG/DL
LDLC SERPL CALC-MCNC: 102 MG/DL (ref 0–130)
TRIGL SERPL-MCNC: 390 MG/DL
VIT B12 SERPL-MCNC: 512 PG/ML (ref 232–1245)

## 2023-09-10 LAB
EST. AVERAGE GLUCOSE BLD GHB EST-MCNC: 120 MG/DL
HBA1C MFR BLD: 5.8 % (ref 4–6)

## 2023-12-30 NOTE — PLAN OF CARE
Problem: Discharge Planning:  Goal: Discharged to appropriate level of care  Description: Discharged to appropriate level of care-Home no needs  7/28/2021 1246 by Bert Aponte RN  Outcome: Met This Shift 5 (moderate pain)

## 2024-02-05 ENCOUNTER — HOSPITAL ENCOUNTER (OUTPATIENT)
Age: 64
Discharge: HOME | End: 2024-02-05
Payer: MEDICARE

## 2024-02-05 ENCOUNTER — OFFICE VISIT (OUTPATIENT)
Dept: PRIMARY CARE CLINIC | Age: 64
End: 2024-02-05
Payer: MEDICARE

## 2024-02-05 ENCOUNTER — HOSPITAL ENCOUNTER (OUTPATIENT)
Dept: CT IMAGING | Age: 64
Discharge: HOME | End: 2024-02-07
Payer: MEDICARE

## 2024-02-05 VITALS
DIASTOLIC BLOOD PRESSURE: 100 MMHG | WEIGHT: 169 LBS | TEMPERATURE: 98.3 F | OXYGEN SATURATION: 96 % | HEIGHT: 62 IN | HEART RATE: 94 BPM | SYSTOLIC BLOOD PRESSURE: 150 MMHG | BODY MASS INDEX: 31.1 KG/M2

## 2024-02-05 DIAGNOSIS — R19.7 BLOODY DIARRHEA: Primary | ICD-10-CM

## 2024-02-05 DIAGNOSIS — R19.7 BLOODY DIARRHEA: ICD-10-CM

## 2024-02-05 DIAGNOSIS — K52.9 COLITIS: Primary | ICD-10-CM

## 2024-02-05 DIAGNOSIS — K52.9 COLITIS: ICD-10-CM

## 2024-02-05 DIAGNOSIS — M54.12 CERVICAL RADICULOPATHY: ICD-10-CM

## 2024-02-05 LAB
ANION GAP SERPL CALCULATED.3IONS-SCNC: 14 MMOL/L (ref 9–17)
BASOPHILS # BLD: <0.03 K/UL (ref 0–0.2)
BASOPHILS NFR BLD: 0 % (ref 0–2)
BUN SERPL-MCNC: 11 MG/DL (ref 8–23)
BUN/CREAT SERPL: 16 (ref 9–20)
CALCIUM SERPL-MCNC: 9.5 MG/DL (ref 8.6–10.4)
CHLORIDE SERPL-SCNC: 102 MMOL/L (ref 98–107)
CO2 SERPL-SCNC: 22 MMOL/L (ref 20–31)
CREAT SERPL-MCNC: 0.7 MG/DL (ref 0.5–0.9)
EOSINOPHIL # BLD: 0.12 K/UL (ref 0–0.44)
EOSINOPHILS RELATIVE PERCENT: 1 % (ref 1–4)
ERYTHROCYTE [DISTWIDTH] IN BLOOD BY AUTOMATED COUNT: 14.1 % (ref 11.8–14.4)
GFR SERPL CREATININE-BSD FRML MDRD: >60 ML/MIN/1.73M2
GLUCOSE SERPL-MCNC: 93 MG/DL (ref 70–99)
HCT VFR BLD AUTO: 39.8 % (ref 36.3–47.1)
HGB BLD-MCNC: 12.9 G/DL (ref 11.9–15.1)
IMM GRANULOCYTES # BLD AUTO: 0.03 K/UL (ref 0–0.3)
IMM GRANULOCYTES NFR BLD: 0 %
LYMPHOCYTES NFR BLD: 1.79 K/UL (ref 1.1–3.7)
LYMPHOCYTES RELATIVE PERCENT: 18 % (ref 24–43)
MCH RBC QN AUTO: 27.4 PG (ref 25.2–33.5)
MCHC RBC AUTO-ENTMCNC: 32.4 G/DL (ref 25.2–33.5)
MCV RBC AUTO: 84.5 FL (ref 82.6–102.9)
MONOCYTES NFR BLD: 0.68 K/UL (ref 0.1–1.2)
MONOCYTES NFR BLD: 7 % (ref 3–12)
NEUTROPHILS NFR BLD: 74 % (ref 36–65)
NEUTS SEG NFR BLD: 7.43 K/UL (ref 1.5–8.1)
NRBC BLD-RTO: 0 PER 100 WBC
PLATELET # BLD AUTO: 393 K/UL (ref 138–453)
PMV BLD AUTO: 9.6 FL (ref 8.1–13.5)
POTASSIUM SERPL-SCNC: 3.6 MMOL/L (ref 3.7–5.3)
RBC # BLD AUTO: 4.71 M/UL (ref 3.95–5.11)
SODIUM SERPL-SCNC: 138 MMOL/L (ref 135–144)
WBC OTHER # BLD: 10.1 K/UL (ref 3.5–11.3)

## 2024-02-05 PROCEDURE — 36415 COLL VENOUS BLD VENIPUNCTURE: CPT

## 2024-02-05 PROCEDURE — 6360000004 HC RX CONTRAST MEDICATION: Performed by: NURSE PRACTITIONER

## 2024-02-05 PROCEDURE — 85025 COMPLETE CBC W/AUTO DIFF WBC: CPT

## 2024-02-05 PROCEDURE — 99214 OFFICE O/P EST MOD 30 MIN: CPT | Performed by: NURSE PRACTITIONER

## 2024-02-05 PROCEDURE — 3077F SYST BP >= 140 MM HG: CPT | Performed by: NURSE PRACTITIONER

## 2024-02-05 PROCEDURE — 3080F DIAST BP >= 90 MM HG: CPT | Performed by: NURSE PRACTITIONER

## 2024-02-05 PROCEDURE — 99213 OFFICE O/P EST LOW 20 MIN: CPT | Performed by: NURSE PRACTITIONER

## 2024-02-05 PROCEDURE — 80048 BASIC METABOLIC PNL TOTAL CA: CPT

## 2024-02-05 PROCEDURE — 1124F ACP DISCUSS-NO DSCNMKR DOCD: CPT | Performed by: NURSE PRACTITIONER

## 2024-02-05 PROCEDURE — 2709999900 CT ABDOMEN PELVIS W IV CONTRAST

## 2024-02-05 RX ORDER — AMOXICILLIN AND CLAVULANATE POTASSIUM 875; 125 MG/1; MG/1
1 TABLET, FILM COATED ORAL 2 TIMES DAILY
Qty: 20 TABLET | Refills: 0 | Status: SHIPPED | OUTPATIENT
Start: 2024-02-05 | End: 2024-02-15

## 2024-02-05 RX ORDER — CYCLOBENZAPRINE HCL 10 MG
10 TABLET ORAL 3 TIMES DAILY PRN
Qty: 90 TABLET | Refills: 0 | Status: SHIPPED | OUTPATIENT
Start: 2024-02-05

## 2024-02-05 RX ORDER — HYDROCODONE BITARTRATE AND ACETAMINOPHEN 5; 325 MG/1; MG/1
1 TABLET ORAL EVERY 6 HOURS PRN
Qty: 12 TABLET | Refills: 0 | Status: SHIPPED | OUTPATIENT
Start: 2024-02-05 | End: 2024-02-08

## 2024-02-05 RX ADMIN — IOPAMIDOL 100 ML: 755 INJECTION, SOLUTION INTRAVENOUS at 16:53

## 2024-02-05 ASSESSMENT — ENCOUNTER SYMPTOMS
ABDOMINAL PAIN: 1
BLOOD IN STOOL: 1
DIARRHEA: 1

## 2024-02-05 NOTE — PROGRESS NOTES
Subjective:      Patient ID: Nelly Ramirez is a 63 y.o. female coming in for   Chief Complaint   Patient presents with    Diarrhea     Vomiting. Blood in diarrhea. Sx began yesterday        HPI  Vomiting and diarrhea started yesterday along with abdominal cramping. Has had blood in stool as well. Reports stool has turned into bloody mucous. Pt reports eating [employer] pizza yesterday prior to symptoms started. Vomiting has resolved, but diarrhea persist.   Hx of colitis and IBS. Did see GI in Sept, but did not follow up after that appt       Review of Systems   Constitutional:  Negative for fever.   Gastrointestinal:  Positive for abdominal pain, blood in stool and diarrhea.        Objective:BP (!) 150/100   Pulse 94   Temp 98.3 °F (36.8 °C) (Tympanic)   Ht 1.562 m (5' 1.5\")   Wt 76.7 kg (169 lb)   SpO2 96%   BMI 31.42 kg/m²      Physical Exam  Vitals and nursing note reviewed.   Constitutional:       General: She is not in acute distress.     Appearance: Normal appearance. She is not ill-appearing or diaphoretic.   HENT:      Head: Normocephalic.   Cardiovascular:      Rate and Rhythm: Normal rate and regular rhythm.      Heart sounds: Normal heart sounds.   Pulmonary:      Effort: Pulmonary effort is normal.      Breath sounds: Normal breath sounds.   Abdominal:      General: Bowel sounds are normal. There is no distension.      Palpations: Abdomen is soft.      Tenderness: There is abdominal tenderness in the right lower quadrant, suprapubic area and left lower quadrant. There is no guarding or rebound.   Musculoskeletal:      Right lower leg: No edema.      Left lower leg: No edema.   Skin:     General: Skin is warm and dry.      Findings: No rash.   Neurological:      General: No focal deficit present.      Mental Status: She is alert and oriented to person, place, and time.          Assessment:      1. Bloody diarrhea    2. Colitis    3. Cervical radiculopathy           Plan:    -CT abd/pelvis with

## 2024-03-28 ENCOUNTER — OFFICE VISIT (OUTPATIENT)
Dept: PRIMARY CARE CLINIC | Age: 64
End: 2024-03-28
Payer: MEDICARE

## 2024-03-28 VITALS
HEART RATE: 130 BPM | DIASTOLIC BLOOD PRESSURE: 74 MMHG | OXYGEN SATURATION: 97 % | TEMPERATURE: 98.7 F | WEIGHT: 171 LBS | SYSTOLIC BLOOD PRESSURE: 120 MMHG | BODY MASS INDEX: 31.79 KG/M2

## 2024-03-28 DIAGNOSIS — J01.10 ACUTE NON-RECURRENT FRONTAL SINUSITIS: ICD-10-CM

## 2024-03-28 DIAGNOSIS — J18.9 WALKING PNEUMONIA: Primary | ICD-10-CM

## 2024-03-28 PROCEDURE — 3078F DIAST BP <80 MM HG: CPT | Performed by: NURSE PRACTITIONER

## 2024-03-28 PROCEDURE — 99213 OFFICE O/P EST LOW 20 MIN: CPT | Performed by: NURSE PRACTITIONER

## 2024-03-28 PROCEDURE — 1124F ACP DISCUSS-NO DSCNMKR DOCD: CPT | Performed by: NURSE PRACTITIONER

## 2024-03-28 PROCEDURE — 3074F SYST BP LT 130 MM HG: CPT | Performed by: NURSE PRACTITIONER

## 2024-03-28 RX ORDER — PROMETHAZINE HYDROCHLORIDE AND CODEINE PHOSPHATE 6.25; 1 MG/5ML; MG/5ML
5 SYRUP ORAL EVERY 4 HOURS PRN
Qty: 150 ML | Refills: 0 | Status: SHIPPED | OUTPATIENT
Start: 2024-03-28 | End: 2024-03-28 | Stop reason: ALTCHOICE

## 2024-03-28 RX ORDER — CODEINE PHOSPHATE/GUAIFENESIN 10-100MG/5
5 LIQUID (ML) ORAL 3 TIMES DAILY PRN
Qty: 75 ML | Refills: 0 | Status: SHIPPED | OUTPATIENT
Start: 2024-03-28 | End: 2024-04-02

## 2024-03-28 RX ORDER — ALBUTEROL SULFATE 90 UG/1
2 AEROSOL, METERED RESPIRATORY (INHALATION) EVERY 4 HOURS PRN
Qty: 18 G | Refills: 1 | Status: SHIPPED | OUTPATIENT
Start: 2024-03-28

## 2024-03-28 RX ORDER — DOXYCYCLINE HYCLATE 100 MG
100 TABLET ORAL 2 TIMES DAILY
Qty: 20 TABLET | Refills: 0 | Status: SHIPPED | OUTPATIENT
Start: 2024-03-28 | End: 2024-04-07

## 2024-03-28 ASSESSMENT — PATIENT HEALTH QUESTIONNAIRE - PHQ9
SUM OF ALL RESPONSES TO PHQ QUESTIONS 1-9: 0
SUM OF ALL RESPONSES TO PHQ9 QUESTIONS 1 & 2: 0
SUM OF ALL RESPONSES TO PHQ QUESTIONS 1-9: 0
2. FEELING DOWN, DEPRESSED OR HOPELESS: NOT AT ALL
1. LITTLE INTEREST OR PLEASURE IN DOING THINGS: NOT AT ALL

## 2024-03-28 ASSESSMENT — ENCOUNTER SYMPTOMS
SHORTNESS OF BREATH: 0
COUGH: 1
WHEEZING: 0

## 2024-03-28 NOTE — PROGRESS NOTES
Subjective:      Patient ID: Nelly Ramirez is a 63 y.o. female coming in for   Chief Complaint   Patient presents with    Cough     Greater than a week, sinus congestion ha        Cough  This is a new problem. Episode onset: x2 weeks. The problem has been unchanged. The cough is Productive of sputum. Associated symptoms include a fever, headaches, nasal congestion and postnasal drip. Pertinent negatives include no chest pain (pain to sternum with cough/deep breathing), shortness of breath or wheezing. She has tried OTC cough suppressant for the symptoms. The treatment provided no relief.         Review of Systems   Constitutional:  Positive for fever.   HENT:  Positive for postnasal drip.    Respiratory:  Positive for cough. Negative for shortness of breath and wheezing.    Cardiovascular:  Negative for chest pain (pain to sternum with cough/deep breathing).   Neurological:  Positive for headaches.        Objective:   Physical Exam  Vitals and nursing note reviewed.   Constitutional:       General: She is not in acute distress.     Appearance: Normal appearance. She is not ill-appearing.   HENT:      Head: Normocephalic.      Nose: Congestion and rhinorrhea present.      Right Sinus: Frontal sinus tenderness present.      Left Sinus: Frontal sinus tenderness present.      Mouth/Throat:      Mouth: Mucous membranes are moist.      Pharynx: Oropharynx is clear. No oropharyngeal exudate or posterior oropharyngeal erythema.   Cardiovascular:      Rate and Rhythm: Normal rate and regular rhythm.      Heart sounds: Normal heart sounds.   Pulmonary:      Effort: Pulmonary effort is normal.      Breath sounds: Normal breath sounds.   Chest:      Chest wall: Tenderness present.   Musculoskeletal:      Cervical back: Neck supple.   Lymphadenopathy:      Cervical: No cervical adenopathy.   Skin:     General: Skin is warm and dry.      Findings: No rash.   Neurological:      General: No focal deficit present.      Mental

## 2024-06-24 ENCOUNTER — OFFICE VISIT (OUTPATIENT)
Dept: PRIMARY CARE CLINIC | Age: 64
End: 2024-06-24
Payer: MEDICARE

## 2024-06-24 VITALS
RESPIRATION RATE: 16 BRPM | HEIGHT: 60 IN | SYSTOLIC BLOOD PRESSURE: 142 MMHG | TEMPERATURE: 98 F | OXYGEN SATURATION: 99 % | HEART RATE: 97 BPM | DIASTOLIC BLOOD PRESSURE: 82 MMHG | WEIGHT: 170 LBS | BODY MASS INDEX: 33.38 KG/M2

## 2024-06-24 DIAGNOSIS — M54.2 CERVICALGIA: Primary | ICD-10-CM

## 2024-06-24 PROCEDURE — 99213 OFFICE O/P EST LOW 20 MIN: CPT

## 2024-06-24 PROCEDURE — 3077F SYST BP >= 140 MM HG: CPT

## 2024-06-24 PROCEDURE — 1124F ACP DISCUSS-NO DSCNMKR DOCD: CPT

## 2024-06-24 PROCEDURE — 3079F DIAST BP 80-89 MM HG: CPT

## 2024-06-24 ASSESSMENT — ENCOUNTER SYMPTOMS
VISUAL CHANGE: 0
COLOR CHANGE: 0
PHOTOPHOBIA: 0
SHORTNESS OF BREATH: 0

## 2024-06-24 NOTE — PROGRESS NOTES
[facility]  [address] [facility] [address]  Phone: [phone]  Fax: [phone]      Nelly Ramirez  1960  MRN: [id number]  Date of visit: 6/24/2024    Chief Complaint:     Nelly Ramirez is here for c/o of Fatigue (Having pain on left side of head, tingling in hands, and into legs. )      HPI:     Nelly Ramirez is a 63 y.o. female who presents to the [facility] today for her medical conditions/complaints as noted below.    Neck Pain   This is a new problem. Episode onset: 3 days. The problem occurs constantly. The pain is associated with nothing. The pain is present in the left side. Quality: \"feels like a bubble is coming out the side of my head\" The pain is at a severity of 9/10. Nothing aggravates the symptoms. Associated symptoms include headaches, numbness and tingling. Pertinent negatives include no chest pain, fever, leg pain, photophobia, syncope, visual change or weakness. Associated symptoms comments: Fatigue, pain in left side of mouth. Treatments tried: tylenol, muscle relaxer. The treatment provided mild relief.       Past Medical History:   Diagnosis Date    Anxiety     Asthma     Cerebral artery occlusion with cerebral infarction ([facility]) 2009    Cerebrovascular disease     stroke 1998    Depression     Diverticulitis     Fibromyalgia     History of small bowel obstruction     Hypertension     Lupus ([facility])     Seizures ([facility]) 2005 and 2006        Allergies   Allergen Reactions    Latex Swelling     The powder in the gloves    Dilaudid [Hydromorphone Hcl] Nausea And Vomiting    Medrol [Methylprednisolone] Other (See Comments)     Back pain    Prednisone Nausea And Vomiting         Subjective:      Review of Systems   Constitutional:  Negative for fever.   HENT:  Positive for ear pain and tinnitus. Negative for dental problem.    Eyes:  Negative for photophobia.   Respiratory:  Negative for shortness of breath.

## 2024-06-24 NOTE — PATIENT INSTRUCTIONS
Take 1/2 tab of muscle relaxer you have at home  Take tylenol and ibuprofen as needed for pain  Use hot or cold compress  Perform the attached exercises  Follow up with PCP or return to the clinic for no improvement and or worsening of symptoms.

## 2024-07-25 ENCOUNTER — HOSPITAL ENCOUNTER (EMERGENCY)
Age: 64
Discharge: HOME | End: 2024-07-25
Attending: EMERGENCY MEDICINE
Payer: MEDICARE

## 2024-07-25 ENCOUNTER — APPOINTMENT (OUTPATIENT)
Dept: GENERAL RADIOLOGY | Age: 64
End: 2024-07-25
Payer: MEDICARE

## 2024-07-25 ENCOUNTER — OFFICE VISIT (OUTPATIENT)
Dept: PRIMARY CARE CLINIC | Age: 64
End: 2024-07-25

## 2024-07-25 VITALS
TEMPERATURE: 98.1 F | HEART RATE: 104 BPM | HEIGHT: 60 IN | RESPIRATION RATE: 25 BRPM | SYSTOLIC BLOOD PRESSURE: 171 MMHG | BODY MASS INDEX: 33.18 KG/M2 | WEIGHT: 169 LBS | OXYGEN SATURATION: 93 % | DIASTOLIC BLOOD PRESSURE: 74 MMHG

## 2024-07-25 VITALS
HEIGHT: 60 IN | BODY MASS INDEX: 33.28 KG/M2 | TEMPERATURE: 97.4 F | HEART RATE: 112 BPM | SYSTOLIC BLOOD PRESSURE: 189 MMHG | OXYGEN SATURATION: 96 % | DIASTOLIC BLOOD PRESSURE: 92 MMHG | WEIGHT: 169.5 LBS | RESPIRATION RATE: 20 BRPM

## 2024-07-25 DIAGNOSIS — R07.89 CHEST WALL PAIN: Primary | ICD-10-CM

## 2024-07-25 DIAGNOSIS — R07.9 CHEST PAIN, UNSPECIFIED TYPE: Primary | ICD-10-CM

## 2024-07-25 LAB
ANION GAP SERPL CALCULATED.3IONS-SCNC: 15 MMOL/L (ref 9–17)
BASOPHILS # BLD: <0.03 K/UL (ref 0–0.2)
BASOPHILS NFR BLD: 0 % (ref 0–2)
BUN SERPL-MCNC: 7 MG/DL (ref 8–23)
BUN/CREAT SERPL: 12 (ref 9–20)
CALCIUM SERPL-MCNC: 9.1 MG/DL (ref 8.6–10.4)
CHLORIDE SERPL-SCNC: 103 MMOL/L (ref 98–107)
CO2 SERPL-SCNC: 22 MMOL/L (ref 20–31)
CREAT SERPL-MCNC: 0.6 MG/DL (ref 0.5–0.9)
D DIMER PPP FEU-MCNC: 0.55 UG/ML FEU (ref 0–0.59)
EOSINOPHIL # BLD: 0.26 K/UL (ref 0–0.44)
EOSINOPHILS RELATIVE PERCENT: 3 % (ref 1–4)
ERYTHROCYTE [DISTWIDTH] IN BLOOD BY AUTOMATED COUNT: 13.8 % (ref 11.8–14.4)
GFR, ESTIMATED: >90 ML/MIN/1.73M2
GLUCOSE SERPL-MCNC: 82 MG/DL (ref 70–99)
HCT VFR BLD AUTO: 38.4 % (ref 36.3–47.1)
HGB BLD-MCNC: 12.3 G/DL (ref 11.9–15.1)
IMM GRANULOCYTES # BLD AUTO: 0.03 K/UL (ref 0–0.3)
IMM GRANULOCYTES NFR BLD: 0 %
LYMPHOCYTES NFR BLD: 1.91 K/UL (ref 1.1–3.7)
LYMPHOCYTES RELATIVE PERCENT: 23 % (ref 24–43)
MCH RBC QN AUTO: 27 PG (ref 25.2–33.5)
MCHC RBC AUTO-ENTMCNC: 32 G/DL (ref 25.2–33.5)
MCV RBC AUTO: 84.2 FL (ref 82.6–102.9)
MONOCYTES NFR BLD: 0.69 K/UL (ref 0.1–1.2)
MONOCYTES NFR BLD: 8 % (ref 3–12)
NEUTROPHILS NFR BLD: 66 % (ref 36–65)
NEUTS SEG NFR BLD: 5.39 K/UL (ref 1.5–8.1)
NRBC BLD-RTO: 0 PER 100 WBC
PLATELET # BLD AUTO: 411 K/UL (ref 138–453)
PMV BLD AUTO: 9.7 FL (ref 8.1–13.5)
POTASSIUM SERPL-SCNC: 4.2 MMOL/L (ref 3.7–5.3)
RBC # BLD AUTO: 4.56 M/UL (ref 3.95–5.11)
SARS-COV-2 RDRP RESP QL NAA+PROBE: NOT DETECTED
SODIUM SERPL-SCNC: 140 MMOL/L (ref 135–144)
SPECIMEN DESCRIPTION: NORMAL
TROPONIN I SERPL HS-MCNC: 7 NG/L (ref 0–14)
WBC OTHER # BLD: 8.3 K/UL (ref 3.5–11.3)

## 2024-07-25 PROCEDURE — 85379 FIBRIN DEGRADATION QUANT: CPT

## 2024-07-25 PROCEDURE — 99285 EMERGENCY DEPT VISIT HI MDM: CPT

## 2024-07-25 PROCEDURE — 36415 COLL VENOUS BLD VENIPUNCTURE: CPT

## 2024-07-25 PROCEDURE — 87635 SARS-COV-2 COVID-19 AMP PRB: CPT

## 2024-07-25 PROCEDURE — 84484 ASSAY OF TROPONIN QUANT: CPT

## 2024-07-25 PROCEDURE — 93005 ELECTROCARDIOGRAM TRACING: CPT | Performed by: EMERGENCY MEDICINE

## 2024-07-25 PROCEDURE — 80048 BASIC METABOLIC PNL TOTAL CA: CPT

## 2024-07-25 PROCEDURE — 85025 COMPLETE CBC W/AUTO DIFF WBC: CPT

## 2024-07-25 PROCEDURE — 71045 X-RAY EXAM CHEST 1 VIEW: CPT

## 2024-07-25 PROCEDURE — 99999 PR OFFICE/OUTPT VISIT,PROCEDURE ONLY: CPT

## 2024-07-25 RX ORDER — CODEINE PHOSPHATE/GUAIFENESIN 10-100MG/5
5 LIQUID (ML) ORAL 3 TIMES DAILY PRN
Qty: 100 ML | Refills: 0 | Status: SHIPPED | OUTPATIENT
Start: 2024-07-25 | End: 2024-08-01

## 2024-07-25 RX ORDER — CODEINE PHOSPHATE/GUAIFENESIN 10-100MG/5
5 LIQUID (ML) ORAL 3 TIMES DAILY PRN
Qty: 100 ML | Refills: 0 | Status: SHIPPED | OUTPATIENT
Start: 2024-07-25 | End: 2024-07-25

## 2024-07-25 ASSESSMENT — PAIN DESCRIPTION - PAIN TYPE: TYPE: ACUTE PAIN;CHRONIC PAIN

## 2024-07-25 ASSESSMENT — PAIN SCALES - GENERAL: PAINLEVEL_OUTOF10: 6

## 2024-07-25 ASSESSMENT — PAIN DESCRIPTION - FREQUENCY: FREQUENCY: CONTINUOUS

## 2024-07-25 ASSESSMENT — PAIN DESCRIPTION - LOCATION: LOCATION: BACK;CHEST

## 2024-07-25 ASSESSMENT — PAIN DESCRIPTION - ONSET: ONSET: ON-GOING

## 2024-07-25 ASSESSMENT — PAIN - FUNCTIONAL ASSESSMENT
PAIN_FUNCTIONAL_ASSESSMENT: 0-10
PAIN_FUNCTIONAL_ASSESSMENT: PREVENTS OR INTERFERES SOME ACTIVE ACTIVITIES AND ADLS

## 2024-07-25 NOTE — PROGRESS NOTES
Patient was sent over to ER for further work up, patient was agreeable to being seen in ER. Report was called spoke with Michelle. She was wheeled to ER via wheelchair by Walk In staff.

## 2024-07-25 NOTE — ED PROVIDER NOTES
[facility]  EMERGENCY DEPARTMENT ENCOUNTER      Pt Name: Nelly Ramirez  MRN: [id number]  Birthdate 1960  Date of evaluation: 2024      CHIEF COMPLAINT       Chief Complaint   Patient presents with    Chest Pain    Cough    Congestion         HISTORY OF PRESENT ILLNESS      The patient presents emergency department for chest wall pain.  The pain is in the left anterior chest.  It is worse with coughing.  She says she feels like she pulled a muscle.  She has been ill recently with cough and cold symptoms.  She presented to urgent care and they sent her here.  The patient denies significant fever.  She has had continued nasal congestion.  She is coughing up yellowish to brown material.  She is concerned about pneumonia.  She says she was treated for pneumonia previously.  She declines anything for pain at this time.  She is not having nausea.    REVIEW OF SYSTEMS       Recent cough and congestion as noted in HPI.  Reports chest wall pain on the left side.    PAST MEDICAL HISTORY    has a past medical history of Anxiety, Asthma, Cerebral artery occlusion with cerebral infarction (HCC), Cerebrovascular disease, Depression, Diverticulitis, Fibromyalgia, History of small bowel obstruction, Hypertension, Lupus (HCC), and Seizures ([facility]).    SURGICAL HISTORY      has a past surgical history that includes Appendectomy;  section; Hysterectomy (); Dilation and curettage of uterus; Tubal ligation; Colonoscopy (); Colonoscopy (2015); Tooth Extraction (3/1/16); other surgical history (Left, 2016); pr egd transoral biopsy single/multiple (N/A, 2017); Colonoscopy (N/A, 2020); Colonoscopy (N/A, 2021); and Upper gastrointestinal endoscopy (N/A, 2021).    CURRENT MEDICATIONS       Previous Medications    ALBUTEROL SULFATE HFA (VENTOLIN HFA) 108 (90 BASE) MCG/ACT INHALER    Inhale 2 puffs into the lungs every 4 hours as needed for Wheezing or Shortness of Breath

## 2024-07-25 NOTE — DISCHARGE INSTRUCTIONS
Continue medications as directed.  May use cough medication as directed.  Return for difficulty breathing, worsening pain, fever, or if worse in any way.    Please understand that at this time there is no evidence for a more serious underlying process, but that early in the process of an illness or injury, an emergency department workup can be falsely reassuring.  You should contact your family doctor within the next 48 hours for a follow up appointment    THANK YOU!!!    From [facility] and [facility]    On behalf of the Emergency Department staff at [facility], I would like to thank you for giving us the opportunity to address your health care needs and concerns.    We hope that during your visit, our service was delivered in a professional and caring manner. Please keep [facility] in mind as we walk with you down the path to your own personal wellness.     Please expect an automated text message or email from us so we can ask a few questions about your health and progress. Based on your answers, a clinician may call you back to offer help and instructions.    Please understand that early in the process of an illness or injury, an emergency department workup can be falsely reassuring.  If you notice any worsening, changing or persistent symptoms please call your family doctor or return to the ER immediately.     Tell us how we did during your visit at [email or web address]   and let us know about your experience

## 2024-07-26 ENCOUNTER — CARE COORDINATION (OUTPATIENT)
Dept: CARE COORDINATION | Age: 64
End: 2024-07-26

## 2024-07-26 NOTE — CARE COORDINATION
Ambulatory Care Coordination Note     7/26/2024 2:09 PM     Patient Current Location:  Home: [address]  [place] [address]        ACM: Arlene Rodrigues RN     Care Summary Note:     Nelly was seen at [facility] 7/25/2024- Chest wall pain    Spoke with Nelly. She has been coughing. She has picked up cough syrup with codeine but has not taken yet. She is trying to locate her albuterol inhaler. She has the box and it has 1 refill which she will get. She feels that will help her also. Encouraged to drink plenty of fluids.   She stated she will call and schedule F/U with PCP.   She was in agreement to this writer calling next week.     Plan of Care : Enrolled in ACM      Assessments Completed:   General Assessment    Do you have any symptoms that are causing concern?: Yes  Progression since Onset: Unchanged  Reported Symptoms: Cough          Medications Reviewed:   Not completed during this call: will review at next call    Advance Care Planning:   Not reviewed during this call     Care Planning:   Not completed during this call    PCP/Specialist follow up:       Follow Up:   Plan for next ACM outreach in approximately  7/29/2024  to complete:  - CC Protocol assessments  - SDOH assessments  - medication review   - f/u scheduled with PCP?  .   Patient  is agreeable to this plan.

## 2024-07-28 LAB
EKG ATRIAL RATE: 103 BPM
EKG P AXIS: 56 DEGREES
EKG P-R INTERVAL: 130 MS
EKG Q-T INTERVAL: 360 MS
EKG QRS DURATION: 76 MS
EKG QTC CALCULATION (BAZETT): 471 MS
EKG R AXIS: 22 DEGREES
EKG T AXIS: 62 DEGREES
EKG VENTRICULAR RATE: 103 BPM

## 2024-07-29 ENCOUNTER — CARE COORDINATION (OUTPATIENT)
Dept: CARE COORDINATION | Age: 64
End: 2024-07-29

## 2024-07-29 NOTE — CARE COORDINATION
Ambulatory Care Coordination Note     7/29/2024 2:46 PM     Patient outreach attempt by this ACM today to perform care management follow up . AC was unable to reach the patient by telephone today; left voice message requesting a return phone call to this ACM.     ACM: Arlene Rodrigues, RN     Care Summary Note:     Nelly was referred for ACM.     She has: HTN- on medication, last seen by cardiology 2022 and follows with PCP    Lupus, Fibromyalgia, Chronic pain    Plan of Care : Continue assessments, education and support    SDOH    Medication review    HC decision maker    Update care team, F/U cough, PCP apt        PCP/Specialist follow up:       Follow Up:   Plan for next ACM outreach in approximately  8/1/2024  to complete:  - CC Protocol assessments  - disease specific assessments  - SDOH assessments.

## 2024-08-01 ENCOUNTER — CARE COORDINATION (OUTPATIENT)
Dept: CARE COORDINATION | Age: 64
End: 2024-08-01

## 2024-08-01 NOTE — CARE COORDINATION
Ambulatory Care Coordination Note     8/1/2024 11:10 AM     Patient outreach attempt by this ACM today to perform care management follow up . ACM was unable to reach the patient by telephone today; left voice message requesting a return phone call to this ACM.     ACM: Arlene Rodrigues, RN     Care Summary Note:     Nelly was referred for ACM.      She has:          HTN- on medication, last seen by cardiology 2022 and follows with PCP                          Lupus, Fibromyalgia, Chronic pain     Plan of Care : Continue assessments, education and support                          SDOH                          Medication review                          HC decision maker                          Update care team, F/U cough, PCP apt                            8/1/2024- 11:10 am  Left HIPAA compliant voice message requesting return call @ [phone].       PCP/Specialist follow up:   Future Appointments         Provider Specialty Dept Phone    8/7/2024 3:20 PM Brian Carlson APRN - CNP Family Medicine [phone]            Follow Up:   Plan for next ACM outreach in approximately 1 week to complete:  - CC Protocol assessments  - SDOH assessments  - education .

## 2024-08-05 ENCOUNTER — CARE COORDINATION (OUTPATIENT)
Dept: CARE COORDINATION | Age: 64
End: 2024-08-05

## 2024-08-05 SDOH — ECONOMIC STABILITY: HOUSING INSECURITY: IN THE LAST 12 MONTHS, HOW MANY PLACES HAVE YOU LIVED?: 1

## 2024-08-05 SDOH — ECONOMIC STABILITY: INCOME INSECURITY: IN THE LAST 12 MONTHS, WAS THERE A TIME WHEN YOU WERE NOT ABLE TO PAY THE MORTGAGE OR RENT ON TIME?: NO

## 2024-08-05 SDOH — SOCIAL STABILITY: SOCIAL NETWORK: ARE YOU MARRIED, WIDOWED, DIVORCED, SEPARATED, NEVER MARRIED, OR LIVING WITH A PARTNER?: SEPARATED

## 2024-08-05 SDOH — HEALTH STABILITY: MENTAL HEALTH: HOW OFTEN DO YOU HAVE A DRINK CONTAINING ALCOHOL?: NEVER

## 2024-08-05 SDOH — HEALTH STABILITY: PHYSICAL HEALTH: ON AVERAGE, HOW MANY DAYS PER WEEK DO YOU ENGAGE IN MODERATE TO STRENUOUS EXERCISE (LIKE A BRISK WALK)?: 4 DAYS

## 2024-08-05 SDOH — ECONOMIC STABILITY: INCOME INSECURITY: HOW HARD IS IT FOR YOU TO PAY FOR THE VERY BASICS LIKE FOOD, HOUSING, MEDICAL CARE, AND HEATING?: NOT HARD AT ALL

## 2024-08-05 SDOH — SOCIAL STABILITY: SOCIAL NETWORK
IN A TYPICAL WEEK, HOW MANY TIMES DO YOU TALK ON THE PHONE WITH FAMILY, FRIENDS, OR NEIGHBORS?: MORE THAN THREE TIMES A WEEK

## 2024-08-05 SDOH — HEALTH STABILITY: MENTAL HEALTH
STRESS IS WHEN SOMEONE FEELS TENSE, NERVOUS, ANXIOUS, OR CAN'T SLEEP AT NIGHT BECAUSE THEIR MIND IS TROUBLED. HOW STRESSED ARE YOU?: NOT AT ALL

## 2024-08-05 SDOH — HEALTH STABILITY: PHYSICAL HEALTH: ON AVERAGE, HOW MANY MINUTES DO YOU ENGAGE IN EXERCISE AT THIS LEVEL?: 30 MIN

## 2024-08-05 SDOH — ECONOMIC STABILITY: FOOD INSECURITY: WITHIN THE PAST 12 MONTHS, YOU WORRIED THAT YOUR FOOD WOULD RUN OUT BEFORE YOU GOT MONEY TO BUY MORE.: NEVER TRUE

## 2024-08-05 SDOH — SOCIAL STABILITY: SOCIAL NETWORK
DO YOU BELONG TO ANY CLUBS OR ORGANIZATIONS SUCH AS CHURCH GROUPS UNIONS, FRATERNAL OR ATHLETIC GROUPS, OR SCHOOL GROUPS?: YES

## 2024-08-05 SDOH — HEALTH STABILITY: MENTAL HEALTH: HOW MANY STANDARD DRINKS CONTAINING ALCOHOL DO YOU HAVE ON A TYPICAL DAY?: PATIENT DOES NOT DRINK

## 2024-08-05 SDOH — ECONOMIC STABILITY: FOOD INSECURITY: WITHIN THE PAST 12 MONTHS, THE FOOD YOU BOUGHT JUST DIDN'T LAST AND YOU DIDN'T HAVE MONEY TO GET MORE.: NEVER TRUE

## 2024-08-05 SDOH — SOCIAL STABILITY: SOCIAL NETWORK: HOW OFTEN DO YOU ATTENT MEETINGS OF THE CLUB OR ORGANIZATION YOU BELONG TO?: MORE THAN 4 TIMES PER YEAR

## 2024-08-05 SDOH — SOCIAL STABILITY: SOCIAL NETWORK: HOW OFTEN DO YOU GET TOGETHER WITH FRIENDS OR RELATIVES?: MORE THAN THREE TIMES A WEEK

## 2024-08-05 SDOH — SOCIAL STABILITY: SOCIAL NETWORK: HOW OFTEN DO YOU ATTEND CHURCH OR RELIGIOUS SERVICES?: MORE THAN 4 TIMES PER YEAR

## 2024-08-05 NOTE — CARE COORDINATION
Expenses: Not hard at all   Food Insecurity: No Food Insecurity (8/5/2024)    Hunger Vital Sign     Worried About Running Out of Food in the Last Year: Never true     Ran Out of Food in the Last Year: Never true   Transportation Needs: No Transportation Needs (8/5/2024)    PRAPARE - Transportation     Lack of Transportation (Medical): No     Lack of Transportation (Non-Medical): No   Physical Activity: Insufficiently Active (8/5/2024)    Exercise Vital Sign     Days of Exercise per Week: 4 days     Minutes of Exercise per Session: 30 min   Stress: No Stress Concern Present (8/5/2024)    Citizen of [place] [employer] - Occupational Stress Questionnaire     Feeling of Stress : Not at all   Social Connections: Moderately Integrated (8/5/2024)    Social Connection and Isolation Panel [NHANES]     Frequency of Communication with Friends and Family: More than three times a week     Frequency of Social Gatherings with Friends and Family: More than three times a week     Attends [employer] Services: More than 4 times per year     Active Member of Clubs or Organizations: Yes     Attends Club or Organization Meetings: More than 4 times per year     Marital Status:    Intimate Partner Violence: Not on file   Depression: Not at risk (3/28/2024)    PHQ-2     PHQ-2 Score: 0   Housing Stability: Low Risk  (8/5/2024)    Housing Stability Vital Sign     Unable to Pay for Housing in the Last Year: No     Number of Places Lived in the Last Year: 1     Unstable Housing in the Last Year: No   Interpersonal Safety: Not on file   Utilities: Not At Risk (8/5/2024)    [facility] Utilities     Threatened with loss of utilities: No        Medications Reviewed:   Completed during this call    Advance Care Planning:   Not reviewed during this call     Care Planning:   Education Documentation  No documentation found.  Education Comments  No comments found.     ,    Goals Addressed                      This Visit's Progress

## 2024-08-07 ENCOUNTER — OFFICE VISIT (OUTPATIENT)
Dept: FAMILY MEDICINE CLINIC | Age: 64
End: 2024-08-07
Payer: MEDICARE

## 2024-08-07 ENCOUNTER — COMMUNITY OUTREACH (OUTPATIENT)
Dept: FAMILY MEDICINE CLINIC | Age: 64
End: 2024-08-07

## 2024-08-07 VITALS
HEART RATE: 108 BPM | WEIGHT: 171.8 LBS | DIASTOLIC BLOOD PRESSURE: 94 MMHG | OXYGEN SATURATION: 96 % | HEIGHT: 60 IN | BODY MASS INDEX: 33.73 KG/M2 | RESPIRATION RATE: 16 BRPM | SYSTOLIC BLOOD PRESSURE: 142 MMHG

## 2024-08-07 DIAGNOSIS — R73.9 ELEVATED BLOOD SUGAR: ICD-10-CM

## 2024-08-07 DIAGNOSIS — J40 BRONCHITIS: ICD-10-CM

## 2024-08-07 DIAGNOSIS — M54.12 CERVICAL RADICULOPATHY: ICD-10-CM

## 2024-08-07 DIAGNOSIS — E55.9 VITAMIN D DEFICIENCY: ICD-10-CM

## 2024-08-07 DIAGNOSIS — R06.02 SHORTNESS OF BREATH: ICD-10-CM

## 2024-08-07 DIAGNOSIS — I10 ESSENTIAL HYPERTENSION, BENIGN: Primary | ICD-10-CM

## 2024-08-07 DIAGNOSIS — R00.0 TACHYCARDIA: ICD-10-CM

## 2024-08-07 DIAGNOSIS — Z12.31 ENCOUNTER FOR SCREENING MAMMOGRAM FOR MALIGNANT NEOPLASM OF BREAST: ICD-10-CM

## 2024-08-07 PROCEDURE — 3077F SYST BP >= 140 MM HG: CPT | Performed by: NURSE PRACTITIONER

## 2024-08-07 PROCEDURE — 1124F ACP DISCUSS-NO DSCNMKR DOCD: CPT | Performed by: NURSE PRACTITIONER

## 2024-08-07 PROCEDURE — 3080F DIAST BP >= 90 MM HG: CPT | Performed by: NURSE PRACTITIONER

## 2024-08-07 PROCEDURE — 99214 OFFICE O/P EST MOD 30 MIN: CPT | Performed by: NURSE PRACTITIONER

## 2024-08-07 RX ORDER — DOXYCYCLINE HYCLATE 100 MG
100 TABLET ORAL 2 TIMES DAILY
Qty: 14 TABLET | Refills: 0 | Status: SHIPPED | OUTPATIENT
Start: 2024-08-07 | End: 2024-08-14

## 2024-08-07 RX ORDER — AMLODIPINE BESYLATE 10 MG/1
10 TABLET ORAL DAILY
Qty: 30 TABLET | Refills: 2 | Status: SHIPPED | OUTPATIENT
Start: 2024-08-07

## 2024-08-07 RX ORDER — ERGOCALCIFEROL 1.25 MG/1
50000 CAPSULE ORAL WEEKLY
Qty: 12 CAPSULE | Refills: 1 | Status: SHIPPED | OUTPATIENT
Start: 2024-08-07

## 2024-08-07 RX ORDER — CODEINE PHOSPHATE/GUAIFENESIN 10-100MG/5
5 LIQUID (ML) ORAL 3 TIMES DAILY PRN
Qty: 105 ML | Refills: 0 | Status: SHIPPED | OUTPATIENT
Start: 2024-08-07 | End: 2024-08-14

## 2024-08-07 RX ORDER — CYCLOBENZAPRINE HCL 10 MG
10 TABLET ORAL 3 TIMES DAILY PRN
Qty: 90 TABLET | Refills: 2 | Status: SHIPPED | OUTPATIENT
Start: 2024-08-07

## 2024-08-07 RX ORDER — ALBUTEROL SULFATE 90 UG/1
2 AEROSOL, METERED RESPIRATORY (INHALATION) EVERY 4 HOURS PRN
Qty: 18 G | Refills: 1 | Status: SHIPPED | OUTPATIENT
Start: 2024-08-07 | End: 2024-08-14

## 2024-08-07 ASSESSMENT — PATIENT HEALTH QUESTIONNAIRE - PHQ9
6. FEELING BAD ABOUT YOURSELF - OR THAT YOU ARE A FAILURE OR HAVE LET YOURSELF OR YOUR FAMILY DOWN: NOT AT ALL
SUM OF ALL RESPONSES TO PHQ QUESTIONS 1-9: 1
3. TROUBLE FALLING OR STAYING ASLEEP: NOT AT ALL
1. LITTLE INTEREST OR PLEASURE IN DOING THINGS: NOT AT ALL
SUM OF ALL RESPONSES TO PHQ9 QUESTIONS 1 & 2: 0
2. FEELING DOWN, DEPRESSED OR HOPELESS: NOT AT ALL
7. TROUBLE CONCENTRATING ON THINGS, SUCH AS READING THE NEWSPAPER OR WATCHING TELEVISION: NOT AT ALL
SUM OF ALL RESPONSES TO PHQ QUESTIONS 1-9: 1
4. FEELING TIRED OR HAVING LITTLE ENERGY: SEVERAL DAYS
10. IF YOU CHECKED OFF ANY PROBLEMS, HOW DIFFICULT HAVE THESE PROBLEMS MADE IT FOR YOU TO DO YOUR WORK, TAKE CARE OF THINGS AT HOME, OR GET ALONG WITH OTHER PEOPLE: NOT DIFFICULT AT ALL
8. MOVING OR SPEAKING SO SLOWLY THAT OTHER PEOPLE COULD HAVE NOTICED. OR THE OPPOSITE, BEING SO FIGETY OR RESTLESS THAT YOU HAVE BEEN MOVING AROUND A LOT MORE THAN USUAL: NOT AT ALL
5. POOR APPETITE OR OVEREATING: NOT AT ALL
9. THOUGHTS THAT YOU WOULD BE BETTER OFF DEAD, OR OF HURTING YOURSELF: NOT AT ALL
SUM OF ALL RESPONSES TO PHQ QUESTIONS 1-9: 1
SUM OF ALL RESPONSES TO PHQ QUESTIONS 1-9: 1

## 2024-08-07 NOTE — PATIENT INSTRUCTIONS
Routine labs ordered  Get mammogram scheduled  Treating current cough/congestion with doxycycline  Refilled albuterol inhaler  Plan to get pulmonary function test to rule out any underlying asthma or copd  Continue taking metoprolol tartrate 25mg daily, resume amlodipine 10mg daily, refills of this sent in  Flexeril script sent in.

## 2024-08-07 NOTE — PROGRESS NOTES
Medications as of 8/7/2024   Medication Sig Dispense Refill    vitamin D (ERGOCALCIFEROL) 1.25 MG (25765 UT) CAPS capsule Take 1 capsule by mouth once a week 12 capsule 1    cyclobenzaprine (FLEXERIL) 10 MG tablet Take 1 tablet by mouth 3 times daily as needed for Muscle spasms 90 tablet 2    amLODIPine (NORVASC) 10 MG tablet Take 1 tablet by mouth daily 30 tablet 2    doxycycline hyclate (VIBRA-TABS) 100 MG tablet Take 1 tablet by mouth 2 times daily for 7 days 14 tablet 0    albuterol sulfate HFA (VENTOLIN HFA) 108 (90 Base) MCG/ACT inhaler Inhale 2 puffs into the lungs every 4 hours as needed for Wheezing or Shortness of Breath 18 g 1    guaiFENesin-codeine (TUSSI-ORGANIDIN NR) 100-10 MG/5ML syrup Take 5 mLs by mouth 3 times daily as needed for Cough for up to 7 days. Max Daily Amount: 15 mLs 105 mL 0    metoprolol succinate (TOPROL XL) 25 MG extended release tablet take 1 tablet by mouth once daily 90 tablet 1    ketorolac (TORADOL) 10 MG tablet Take 1 tablet by mouth 3 times daily as needed for Pain 90 tablet 0    vitamin B-12 (CYANOCOBALAMIN) 100 MCG tablet Take 0.5 tablets by mouth daily      omeprazole (PRILOSEC) 20 MG delayed release capsule Take 1 capsule by mouth every morning (before breakfast) 30 capsule 0    Handicap Placard MISC by Does not apply route Issue parking placard for persons with disabilities. [facility] sec 4503.44. Expires in 5 years.  Applicant meets the qualifying disability criteria. 1 each 0    Cholecalciferol (VITAMIN D3) 26757 units CAPS Take 1 capsule by mouth daily 30 capsule 5    [DISCONTINUED] albuterol sulfate HFA (VENTOLIN HFA) 108 (90 Base) MCG/ACT inhaler Inhale 2 puffs into the lungs every 4 hours as needed for Wheezing or Shortness of Breath 18 g 1    [DISCONTINUED] cyclobenzaprine (FLEXERIL) 10 MG tablet Take 1 tablet by mouth 3 times daily as needed for Muscle spasms 90 tablet 0    [DISCONTINUED] lisinopril (PRINIVIL;ZESTRIL) 10 MG tablet Take 1 tablet by mouth daily (Patient

## 2024-08-08 ENCOUNTER — HOSPITAL ENCOUNTER (OUTPATIENT)
Age: 64
Discharge: HOME | End: 2024-08-08
Payer: MEDICARE

## 2024-08-08 DIAGNOSIS — R73.9 ELEVATED BLOOD SUGAR: ICD-10-CM

## 2024-08-08 DIAGNOSIS — R00.0 TACHYCARDIA: ICD-10-CM

## 2024-08-08 LAB
ALBUMIN SERPL-MCNC: 4.1 G/DL (ref 3.5–5.2)
ALBUMIN/GLOB SERPL: 1 {RATIO} (ref 1–2.5)
ALP SERPL-CCNC: 85 U/L (ref 35–104)
ALT SERPL-CCNC: 10 U/L (ref 5–33)
ANION GAP SERPL CALCULATED.3IONS-SCNC: 12 MMOL/L (ref 9–17)
AST SERPL-CCNC: 14 U/L
BASOPHILS # BLD: 0.03 K/UL (ref 0–0.2)
BASOPHILS NFR BLD: 0 % (ref 0–2)
BILIRUB SERPL-MCNC: 0.2 MG/DL (ref 0.3–1.2)
BUN SERPL-MCNC: 11 MG/DL (ref 8–23)
BUN/CREAT SERPL: 18 (ref 9–20)
CALCIUM SERPL-MCNC: 9.1 MG/DL (ref 8.6–10.4)
CHLORIDE SERPL-SCNC: 106 MMOL/L (ref 98–107)
CO2 SERPL-SCNC: 23 MMOL/L (ref 20–31)
CREAT SERPL-MCNC: 0.6 MG/DL (ref 0.5–0.9)
EOSINOPHIL # BLD: 0.29 K/UL (ref 0–0.44)
EOSINOPHILS RELATIVE PERCENT: 4 % (ref 1–4)
ERYTHROCYTE [DISTWIDTH] IN BLOOD BY AUTOMATED COUNT: 14.1 % (ref 11.8–14.4)
EST. AVERAGE GLUCOSE BLD GHB EST-MCNC: 117 MG/DL
GFR, ESTIMATED: >90 ML/MIN/1.73M2
GLUCOSE SERPL-MCNC: 103 MG/DL (ref 70–99)
HBA1C MFR BLD: 5.7 % (ref 4–6)
HCT VFR BLD AUTO: 37.6 % (ref 36.3–47.1)
HGB BLD-MCNC: 12.1 G/DL (ref 11.9–15.1)
IMM GRANULOCYTES # BLD AUTO: 0.03 K/UL (ref 0–0.3)
IMM GRANULOCYTES NFR BLD: 0 %
LYMPHOCYTES NFR BLD: 1.79 K/UL (ref 1.1–3.7)
LYMPHOCYTES RELATIVE PERCENT: 24 % (ref 24–43)
MCH RBC QN AUTO: 27.1 PG (ref 25.2–33.5)
MCHC RBC AUTO-ENTMCNC: 32.2 G/DL (ref 25.2–33.5)
MCV RBC AUTO: 84.1 FL (ref 82.6–102.9)
MONOCYTES NFR BLD: 0.64 K/UL (ref 0.1–1.2)
MONOCYTES NFR BLD: 9 % (ref 3–12)
NEUTROPHILS NFR BLD: 63 % (ref 36–65)
NEUTS SEG NFR BLD: 4.79 K/UL (ref 1.5–8.1)
NRBC BLD-RTO: 0 PER 100 WBC
PLATELET # BLD AUTO: 433 K/UL (ref 138–453)
PMV BLD AUTO: 10 FL (ref 8.1–13.5)
POTASSIUM SERPL-SCNC: 4.5 MMOL/L (ref 3.7–5.3)
PROT SERPL-MCNC: 8.2 G/DL (ref 6.4–8.3)
RBC # BLD AUTO: 4.47 M/UL (ref 3.95–5.11)
SODIUM SERPL-SCNC: 141 MMOL/L (ref 135–144)
WBC OTHER # BLD: 7.6 K/UL (ref 3.5–11.3)

## 2024-08-08 PROCEDURE — 80053 COMPREHEN METABOLIC PANEL: CPT

## 2024-08-08 PROCEDURE — 83036 HEMOGLOBIN GLYCOSYLATED A1C: CPT

## 2024-08-08 PROCEDURE — 85025 COMPLETE CBC W/AUTO DIFF WBC: CPT

## 2024-08-08 PROCEDURE — 36415 COLL VENOUS BLD VENIPUNCTURE: CPT

## 2024-08-14 ENCOUNTER — CARE COORDINATION (OUTPATIENT)
Dept: CARE COORDINATION | Age: 64
End: 2024-08-14

## 2024-08-14 DIAGNOSIS — J40 BRONCHITIS: ICD-10-CM

## 2024-08-14 RX ORDER — ALBUTEROL SULFATE 90 UG/1
AEROSOL, METERED RESPIRATORY (INHALATION)
Qty: 8.5 G | Refills: 2 | Status: SHIPPED | OUTPATIENT
Start: 2024-08-14

## 2024-08-14 ASSESSMENT — ENCOUNTER SYMPTOMS
SHORTNESS OF BREATH: 1
CHEST TIGHTNESS: 1
COUGH: 1

## 2024-08-14 NOTE — CARE COORDINATION
Ambulatory Care Coordination Note     8/14/2024 9:49 AM     Patient outreach attempt by this ACM today to perform care management follow up . ACM was unable to reach the patient by telephone today; left voice message requesting a return phone call to this ACM.     ACM: Arlene Rodrigues, RN     Care Summary Note:     She has:          HTN- on medication, last seen by cardiology 2022 and follows with PCP                          Lupus, Fibromyalgia, Chronic pain     Plan of Care : Continue assessments, education and support                          SDOH completed                          RPM- declined                           Medication reviewed 8/5/2024                          HC decision maker                          Monitors BP at home as needed    8/14/2024- 9:50 am  Left HIPAA compliant voice message requesting return call @ [phone] re: ACM F/U call.       PCP/Specialist follow up:   Future Appointments         Provider Specialty Dept Phone    9/11/2024 10:30 AM [facility] Radiology [phone]    9/11/2024 11:00 AM SCHEDULE, [facility] PFT Pulmonary Function Testing [phone]    9/12/2024 1:20 PM Brian Carlson, APRN - CNP Family Medicine [phone]            Follow Up:   Plan for next AC outreach in approximately 1 week to complete:  - disease specific assessments  - advance care planning  - goal progression  - education .

## 2024-08-14 NOTE — TELEPHONE ENCOUNTER
Nelly called requesting a refill of the below medication which has been pended for you:     Requested Prescriptions     Pending Prescriptions Disp Refills    albuterol sulfate HFA (PROVENTIL;VENTOLIN;PROAIR) 108 (90 Base) MCG/ACT inhaler [Pharmacy Med Name: ALBUTEROL HFA INH (200 PUFFS) 8.5GM] 8.5 g 2     Sig: INHALE 2 PUFFS BY MOUTH AND INTO THE LUNGS EVERY 4 HOURS IF NEEDED FOR WHEEZING SHORTNESS OF BREATH       Last Appointment Date: 8/7/2024  Next Appointment Date: 9/12/2024    Allergies   Allergen Reactions    Latex Swelling     The powder in the gloves    Dilaudid [Hydromorphone Hcl] Nausea And Vomiting    Medrol [Methylprednisolone] Other (See Comments)     Back pain    Morphine Nausea And Vomiting    Prednisone Nausea And Vomiting

## 2024-08-15 ENCOUNTER — TELEPHONE (OUTPATIENT)
Dept: FAMILY MEDICINE CLINIC | Age: 64
End: 2024-08-15

## 2024-08-20 ENCOUNTER — CARE COORDINATION (OUTPATIENT)
Dept: CARE COORDINATION | Age: 64
End: 2024-08-20

## 2024-08-20 NOTE — CARE COORDINATION
Patient was referred to Social Service by Arlene Rodrigues/[facility], who  stated that this patient is interested in receiving paperwork for the ACP.  HC phoned the patient, made introductions and stated the reason for  the call. Patient is excepting of the ACP..       Plan of Care  HC will follow up with the patient in approximately three weeks   to inquire of receipt of paperwork...

## 2024-08-20 NOTE — CARE COORDINATION
Ambulatory Care Coordination Note     8/20/2024 11:21 AM     Patient Current Location:  Home: [address]  [place] [address]     ACM contacted the patient by telephone.       ACM: Arlene Rodrigues RN     Care Summary Note:     She has:          HTN- on medication, last seen by cardiology 2022 and follows with PCP                          Lupus, Fibromyalgia, Chronic pain     Plan of Care : Continue assessments, education and support                          SDOH completed                          RPM- declined                           Medication reviewed 8/5/2024                          HC decision maker reviewed and referral placed for completion of forms.     She is scheduled for PFT testing                          Monitors BP at home as needed    8/20/2024- 11:14 am Unable to reach by phone.    Nelly returned call. She did see PCP. She is scheduled for PFT testing.   No concerns with BP. Target ranges reviewed. Technique reviewed.   She declines flu vaccine.   She was in agreement to referral for ACP document completion. Referral placed.          Assessments Completed:   Hypertension - Encounter Level    Symptom course: stable       and   General Assessment    Do you have any symptoms that are causing concern?: Yes  Progression since Onset: Unchanged  Reported Symptoms: Cough (Comment: PFT testing scheduled)          Medications Reviewed:   Completed during a previous call     Advance Care Planning:   Reviewed and current and Referral to internal ACP facilitator     Care Planning:   Education Documentation  Teach reporting changes in condition, taught by Arlene Rodrigues RN at 8/20/2024 11:22 AM.  Learner: Patient  Readiness: Acceptance  Method: Explanation, Teachback  Response: Verbalizes Understanding    Teach compliance with prescribed medication regimen, taught by Arlene Rodrigues, RN at 8/20/2024 11:22 AM.  Learner: Patient  Readiness: Acceptance  Method: Explanation, Teachback  Response: Verbalizes

## 2024-09-10 ENCOUNTER — CARE COORDINATION (OUTPATIENT)
Dept: CARE COORDINATION | Age: 64
End: 2024-09-10

## 2024-09-11 ENCOUNTER — HOSPITAL ENCOUNTER (OUTPATIENT)
Dept: PULMONOLOGY | Age: 64
Discharge: HOME | End: 2024-09-11
Payer: MEDICARE

## 2024-09-11 ENCOUNTER — CARE COORDINATION (OUTPATIENT)
Dept: CARE COORDINATION | Age: 64
End: 2024-09-11

## 2024-09-11 ENCOUNTER — HOSPITAL ENCOUNTER (OUTPATIENT)
Dept: MAMMOGRAPHY | Age: 64
Discharge: HOME | End: 2024-09-13
Payer: MEDICARE

## 2024-09-11 VITALS — WEIGHT: 171 LBS | BODY MASS INDEX: 33.57 KG/M2 | HEIGHT: 60 IN

## 2024-09-11 DIAGNOSIS — Z12.31 ENCOUNTER FOR SCREENING MAMMOGRAM FOR MALIGNANT NEOPLASM OF BREAST: ICD-10-CM

## 2024-09-11 DIAGNOSIS — R06.02 SHORTNESS OF BREATH: ICD-10-CM

## 2024-09-11 LAB
DLCO %PRED: NORMAL
DLCO PRED: NORMAL
DLCO/VA %PRED: NORMAL
DLCO/VA PRED: NORMAL
DLCO/VA: NORMAL
DLCO: NORMAL
EXPIRATORY TIME-POST: NORMAL
EXPIRATORY TIME: NORMAL
FEF 25-75 %CHNG: NORMAL
FEF 25-75 POST %PRED: NORMAL
FEF 25-75% %PRED-PRE: NORMAL
FEF 25-75% PRED: NORMAL
FEF 25-75-POST: NORMAL
FEF 25-75-PRE: NORMAL
FEV1 %PRED-POST: NORMAL
FEV1 %PRED-PRE: NORMAL
FEV1 PRED: NORMAL
FEV1-POST: NORMAL
FEV1-PRE: NORMAL
FEV1/FVC %PRED-POST: NORMAL
FEV1/FVC %PRED-PRE: NORMAL
FEV1/FVC PRED: NORMAL
FEV1/FVC-POST: NORMAL
FEV1/FVC-PRE: NORMAL
FVC %PRED-POST: NORMAL
FVC %PRED-PRE: NORMAL
FVC PRED: NORMAL
FVC-POST: NORMAL
FVC-PRE: NORMAL
GAW %PRED: NORMAL
GAW PRED: NORMAL
GAW: NORMAL
IC PRE %PRED: NORMAL
IC PRED: NORMAL
IC: NORMAL
MEP: NORMAL
MIP: NORMAL
MVV %PRED-PRE: NORMAL
MVV PRED: NORMAL
MVV-PRE: NORMAL
PEF %PRED-POST: NORMAL
PEF %PRED-PRE: NORMAL
PEF PRED: NORMAL
PEF%CHNG: NORMAL
PEF-POST: NORMAL
PEF-PRE: NORMAL
RAW %PRED: NORMAL
RAW PRED: NORMAL
RAW: NORMAL
RV PRE %PRED: NORMAL
RV PRED: NORMAL
RV: NORMAL
SVC %PRED: NORMAL
SVC PRED: NORMAL
SVC: NORMAL
TLC PRE %PRED: NORMAL
TLC PRED: NORMAL
TLC: NORMAL
VA %PRED: NORMAL
VA PRED: NORMAL
VA: NORMAL
VTG %PRED: NORMAL
VTG PRED: NORMAL
VTG: NORMAL

## 2024-09-11 PROCEDURE — 94640 AIRWAY INHALATION TREATMENT: CPT

## 2024-09-11 PROCEDURE — 6370000000 HC RX 637 (ALT 250 FOR IP): Performed by: INTERNAL MEDICINE

## 2024-09-11 PROCEDURE — 77067 SCR MAMMO BI INCL CAD: CPT

## 2024-09-11 PROCEDURE — 94729 DIFFUSING CAPACITY: CPT

## 2024-09-11 PROCEDURE — 94726 PLETHYSMOGRAPHY LUNG VOLUMES: CPT

## 2024-09-11 PROCEDURE — 94060 EVALUATION OF WHEEZING: CPT

## 2024-09-11 RX ORDER — ALBUTEROL SULFATE 90 UG/1
4 AEROSOL, METERED RESPIRATORY (INHALATION) ONCE
Status: COMPLETED | OUTPATIENT
Start: 2024-09-11 | End: 2024-09-11

## 2024-09-11 RX ADMIN — ALBUTEROL SULFATE 4 PUFF: 90 AEROSOL, METERED RESPIRATORY (INHALATION) at 11:27

## 2024-09-12 ENCOUNTER — OFFICE VISIT (OUTPATIENT)
Dept: FAMILY MEDICINE CLINIC | Age: 64
End: 2024-09-12

## 2024-09-12 VITALS
DIASTOLIC BLOOD PRESSURE: 86 MMHG | OXYGEN SATURATION: 97 % | HEIGHT: 60 IN | SYSTOLIC BLOOD PRESSURE: 118 MMHG | BODY MASS INDEX: 33.57 KG/M2 | WEIGHT: 171 LBS | RESPIRATION RATE: 18 BRPM | HEART RATE: 104 BPM

## 2024-09-12 DIAGNOSIS — M54.12 CERVICAL RADICULOPATHY: ICD-10-CM

## 2024-09-12 DIAGNOSIS — G44.209 TENSION HEADACHE: ICD-10-CM

## 2024-09-12 DIAGNOSIS — R06.02 SHORTNESS OF BREATH: ICD-10-CM

## 2024-09-12 DIAGNOSIS — I10 ESSENTIAL HYPERTENSION, BENIGN: ICD-10-CM

## 2024-09-12 DIAGNOSIS — Z00.00 MEDICARE ANNUAL WELLNESS VISIT, SUBSEQUENT: Primary | ICD-10-CM

## 2024-09-12 RX ORDER — HYDROCODONE BITARTRATE AND ACETAMINOPHEN 5; 325 MG/1; MG/1
1 TABLET ORAL EVERY 6 HOURS PRN
Qty: 28 TABLET | Refills: 0 | Status: SHIPPED | OUTPATIENT
Start: 2024-09-12 | End: 2024-09-19

## 2024-09-12 RX ORDER — CYCLOBENZAPRINE HCL 10 MG
10 TABLET ORAL 3 TIMES DAILY PRN
Qty: 90 TABLET | Refills: 2 | Status: SHIPPED | OUTPATIENT
Start: 2024-09-12

## 2024-09-12 ASSESSMENT — PATIENT HEALTH QUESTIONNAIRE - PHQ9
SUM OF ALL RESPONSES TO PHQ QUESTIONS 1-9: 1
5. POOR APPETITE OR OVEREATING: NOT AT ALL
SUM OF ALL RESPONSES TO PHQ QUESTIONS 1-9: 1
SUM OF ALL RESPONSES TO PHQ QUESTIONS 1-9: 1
4. FEELING TIRED OR HAVING LITTLE ENERGY: SEVERAL DAYS
10. IF YOU CHECKED OFF ANY PROBLEMS, HOW DIFFICULT HAVE THESE PROBLEMS MADE IT FOR YOU TO DO YOUR WORK, TAKE CARE OF THINGS AT HOME, OR GET ALONG WITH OTHER PEOPLE: NOT DIFFICULT AT ALL
3. TROUBLE FALLING OR STAYING ASLEEP: NOT AT ALL
SUM OF ALL RESPONSES TO PHQ QUESTIONS 1-9: 1
6. FEELING BAD ABOUT YOURSELF - OR THAT YOU ARE A FAILURE OR HAVE LET YOURSELF OR YOUR FAMILY DOWN: NOT AT ALL
1. LITTLE INTEREST OR PLEASURE IN DOING THINGS: NOT AT ALL
7. TROUBLE CONCENTRATING ON THINGS, SUCH AS READING THE NEWSPAPER OR WATCHING TELEVISION: NOT AT ALL
8. MOVING OR SPEAKING SO SLOWLY THAT OTHER PEOPLE COULD HAVE NOTICED. OR THE OPPOSITE, BEING SO FIGETY OR RESTLESS THAT YOU HAVE BEEN MOVING AROUND A LOT MORE THAN USUAL: NOT AT ALL
2. FEELING DOWN, DEPRESSED OR HOPELESS: NOT AT ALL
9. THOUGHTS THAT YOU WOULD BE BETTER OFF DEAD, OR OF HURTING YOURSELF: NOT AT ALL
SUM OF ALL RESPONSES TO PHQ9 QUESTIONS 1 & 2: 0

## 2024-09-12 ASSESSMENT — LIFESTYLE VARIABLES
HOW MANY STANDARD DRINKS CONTAINING ALCOHOL DO YOU HAVE ON A TYPICAL DAY: PATIENT DOES NOT DRINK
HOW OFTEN DO YOU HAVE A DRINK CONTAINING ALCOHOL: NEVER

## 2024-09-26 ENCOUNTER — TELEPHONE (OUTPATIENT)
Dept: FAMILY MEDICINE CLINIC | Age: 64
End: 2024-09-26

## 2024-09-26 DIAGNOSIS — E55.9 VITAMIN D DEFICIENCY: ICD-10-CM

## 2024-09-26 RX ORDER — ERGOCALCIFEROL 1.25 MG/1
50000 CAPSULE, LIQUID FILLED ORAL WEEKLY
Qty: 12 CAPSULE | Refills: 1 | Status: SHIPPED | OUTPATIENT
Start: 2024-09-26

## 2024-09-30 ENCOUNTER — HOSPITAL ENCOUNTER (OUTPATIENT)
Dept: MRI IMAGING | Age: 64
Discharge: HOME | End: 2024-10-02
Payer: MEDICARE

## 2024-09-30 DIAGNOSIS — M54.12 CERVICAL RADICULOPATHY: ICD-10-CM

## 2024-09-30 DIAGNOSIS — G44.209 TENSION HEADACHE: ICD-10-CM

## 2024-09-30 PROCEDURE — 72141 MRI NECK SPINE W/O DYE: CPT

## 2024-10-07 DIAGNOSIS — G44.209 TENSION HEADACHE: ICD-10-CM

## 2024-10-07 DIAGNOSIS — M54.12 CERVICAL RADICULOPATHY: Primary | ICD-10-CM

## 2024-10-08 ENCOUNTER — TELEPHONE (OUTPATIENT)
Dept: FAMILY MEDICINE CLINIC | Age: 64
End: 2024-10-08

## 2024-10-08 DIAGNOSIS — M54.12 CERVICAL RADICULOPATHY: Primary | ICD-10-CM

## 2024-10-08 DIAGNOSIS — G44.209 TENSION HEADACHE: ICD-10-CM

## 2024-10-08 NOTE — TELEPHONE ENCOUNTER
[facility] PM calling stating they got a referral on pt and they won's see her as she failed a random and failed a pill count, please refer to someone else.

## 2024-10-09 ENCOUNTER — CARE COORDINATION (OUTPATIENT)
Dept: CARE COORDINATION | Age: 64
End: 2024-10-09

## 2024-10-09 NOTE — CARE COORDINATION
Ambulatory Care Coordination Note     10/9/2024 1:43 PM     Patient outreach attempt by this ACM today to perform care management follow up . [facility] was unable to reach the patient by telephone today; left voice message requesting a return phone call to this ACM.     ACM: Arlene Rodrigues, RN     Care Summary Note:     She has:          HTN- on medication, last seen by cardiology 2022 and follows with PCP                          Lupus, Fibromyalgia, Chronic pain     Plan of Care : Continue assessments, education and support                          SDOH completed                          RPM- declined                           Medication reviewed 8/5/2024                          HC decision maker reviewed and referral placed for completion of forms.                           Monitors BP at home as needed    PCP/Specialist follow up:       Follow Up:   Plan for next ACM outreach in approximately 1 week to complete:  - goal progression  - education .

## 2024-10-16 ENCOUNTER — CARE COORDINATION (OUTPATIENT)
Dept: CARE COORDINATION | Age: 64
End: 2024-10-16

## 2024-10-16 NOTE — CARE COORDINATION
Ambulatory Care Coordination Note     10/16/2024 10:40 AM     Patient Current Location:  Home: [address]  [place] [address]     ACM contacted the patient by telephone.     ACM: Arlene Rodrigues, RN    Care Summary Note:     She has:          HTN- on medication, last seen by cardiology 2022 and follows with PCP                          Lupus, Fibromyalgia, Chronic pain     Plan of Care : Continue assessments, education and support                          SDOH completed                          RPM- declined                           Medication reviewed 8/5/2024                           decision maker reviewed and referral placed for completion of forms.                           Monitors BP at home as needed                          F/U on referral to Pain Management at [facility]     Spoke with Nelly. She was given contact information for PM at [facility].   She is taking tylenol, using heating pad and muscle relaxants for pain. She stated it does help calm the pain down.        Assessments Completed:   General Assessment    Do you have any symptoms that are causing concern?: Yes  Progression since Onset: Intermittent - Waxing/Waning  Reported Symptoms: Pain          Medications Reviewed:   Completed during a previous call     Advance Care Planning:   Reviewed during previous call      Care Planning:   Education Documentation  No documentation found.  Education Comments  No comments found.     ,    Goals Addressed                      This Visit's Progress       Patient Stated      Patient Stated (pt-stated)   On track      She is going to f/u with PCP and have medications reviewed    Barriers: lack of support and lack of education  Plan for overcoming my barriers: Care Coordination Intervention  Confidence: 10/10  Anticipated Goal Completion Date: 11/5/2024               PCP/Specialist follow up:       Follow Up:   Plan for next ACM outreach in approximately 1 month to complete:  - education .

## 2024-10-22 ENCOUNTER — CARE COORDINATION (OUTPATIENT)
Dept: CARE COORDINATION | Age: 64
End: 2024-10-22

## 2024-10-22 NOTE — CARE COORDINATION
HC phoned and left a message for the patient and   provided contact information for a return call.     Plan  of Care  HC will attempt to follow up with patient   If no return call within 3-5 days.

## 2024-10-22 NOTE — CARE COORDINATION
Patient returned the HC 's call, she stated that things are  well.  Patient also stated that she did receive the ACP  documents and that she is able to complete the paperwork  and have it notorized.  Patient denied any other social needs.    Plan of Care  HC will sign off of patient as she stated   that she is able to handle the ACP and has no other needs

## 2024-11-08 DIAGNOSIS — R10.13 EPIGASTRIC PAIN: ICD-10-CM

## 2024-11-08 DIAGNOSIS — I10 ESSENTIAL HYPERTENSION, BENIGN: ICD-10-CM

## 2024-11-08 DIAGNOSIS — K58.2 IRRITABLE BOWEL SYNDROME WITH BOTH CONSTIPATION AND DIARRHEA: ICD-10-CM

## 2024-11-08 RX ORDER — AMLODIPINE BESYLATE 10 MG/1
10 TABLET ORAL DAILY
Qty: 30 TABLET | Refills: 0 | Status: SHIPPED | OUTPATIENT
Start: 2024-11-08

## 2024-11-08 RX ORDER — DICYCLOMINE HYDROCHLORIDE 10 MG/1
10 CAPSULE ORAL 4 TIMES DAILY PRN
Qty: 60 CAPSULE | Refills: 0 | Status: SHIPPED | OUTPATIENT
Start: 2024-11-08

## 2024-11-08 NOTE — TELEPHONE ENCOUNTER
Nelly called requesting a refill of the below medication which has been pended for you:     Requested Prescriptions      No prescriptions requested or ordered in this encounter       Last Appointment Date: 9/12/2024  Next Appointment Date:     Allergies   Allergen Reactions    Latex Swelling     The powder in the gloves    Dilaudid [Hydromorphone Hcl] Nausea And Vomiting    Medrol [Methylprednisolone] Other (See Comments)     Back pain    Morphine Nausea And Vomiting    Prednisone Nausea And Vomiting

## 2024-11-13 ENCOUNTER — CARE COORDINATION (OUTPATIENT)
Dept: CARE COORDINATION | Age: 64
End: 2024-11-13

## 2024-11-13 NOTE — CARE COORDINATION
MRI ROOM Radiology [phone]    11/15/2024 5:30 PM [facility] MRI ROOM Radiology [phone]            Follow Up:   Plan for next ACM outreach in approximately 1 month to complete:  - goal progression.   Patient  is agreeable to this plan.

## 2024-12-05 ENCOUNTER — CARE COORDINATION (OUTPATIENT)
Dept: CARE COORDINATION | Age: 64
End: 2024-12-05

## 2024-12-05 NOTE — CARE COORDINATION
Ambulatory Care Coordination Note     12/5/2024 4:07 PM    ACM contacted the patient by telephone.      ACM: Arlene Rodrigues, RN     Care Summary Note:     She has:          HTN- on medication, last seen by cardiology 2022 and follows with PCP                          Lupus, Fibromyalgia, Chronic pain- following with Pain Management      Plan of Care : Completion of ACM     Spoke with Nelly.   She is doing well. She will schedule with neurology after first of the year.   She has resources, apts scheduled and this writer's contact information.   She is aware that this writer will no longer be calling. She will call if needs arise.                              PCP/Specialist follow up:   Future Appointments         Provider Specialty Dept Phone    1/7/2025 10:30 AM [facility] MRI ROOM Radiology [phone]    1/7/2025 11:30 AM [facility] MRI ROOM Radiology [phone]            Follow Up:   No further Ambulatory Care Management follow-up scheduled at this time.  Patient  has Ambulatory Care Manager's contact information for any further questions, concerns or needs.

## 2024-12-08 DIAGNOSIS — I10 ESSENTIAL HYPERTENSION, BENIGN: ICD-10-CM

## 2024-12-09 RX ORDER — AMLODIPINE BESYLATE 10 MG/1
10 TABLET ORAL DAILY
Qty: 90 TABLET | Refills: 0 | Status: SHIPPED | OUTPATIENT
Start: 2024-12-09

## 2024-12-09 NOTE — TELEPHONE ENCOUNTER
Nelly called requesting a refill of the below medication which has been pended for you:     Requested Prescriptions     Pending Prescriptions Disp Refills    amLODIPine (NORVASC) 10 MG tablet [Pharmacy Med Name: AMLODIPINE BESYLATE 10MG TABLETS] 90 tablet 0     Sig: TAKE 1 TABLET BY MOUTH DAILY       Last Appointment Date: 9/12/2024  Next Appointment Date: Visit date not found    Allergies   Allergen Reactions    Latex Swelling     The powder in the gloves    Dilaudid [Hydromorphone Hcl] Nausea And Vomiting    Medrol [Methylprednisolone] Other (See Comments)     Back pain    Morphine Nausea And Vomiting    Prednisone Nausea And Vomiting

## 2025-01-17 ENCOUNTER — OFFICE VISIT (OUTPATIENT)
Dept: PRIMARY CARE CLINIC | Age: 65
End: 2025-01-17
Payer: MEDICARE

## 2025-01-17 VITALS
RESPIRATION RATE: 20 BRPM | SYSTOLIC BLOOD PRESSURE: 132 MMHG | WEIGHT: 166 LBS | TEMPERATURE: 98 F | DIASTOLIC BLOOD PRESSURE: 74 MMHG | OXYGEN SATURATION: 97 % | HEART RATE: 100 BPM | BODY MASS INDEX: 32.42 KG/M2

## 2025-01-17 DIAGNOSIS — R05.1 ACUTE COUGH: ICD-10-CM

## 2025-01-17 DIAGNOSIS — B96.89 BACTERIAL URI: Primary | ICD-10-CM

## 2025-01-17 DIAGNOSIS — J06.9 BACTERIAL URI: Primary | ICD-10-CM

## 2025-01-17 PROCEDURE — 1160F RVW MEDS BY RX/DR IN RCRD: CPT

## 2025-01-17 PROCEDURE — 1159F MED LIST DOCD IN RCRD: CPT

## 2025-01-17 PROCEDURE — 99213 OFFICE O/P EST LOW 20 MIN: CPT

## 2025-01-17 PROCEDURE — 3075F SYST BP GE 130 - 139MM HG: CPT

## 2025-01-17 PROCEDURE — 99212 OFFICE O/P EST SF 10 MIN: CPT

## 2025-01-17 PROCEDURE — 1124F ACP DISCUSS-NO DSCNMKR DOCD: CPT

## 2025-01-17 PROCEDURE — 3078F DIAST BP <80 MM HG: CPT

## 2025-01-17 RX ORDER — BENZONATATE 100 MG/1
100 CAPSULE ORAL 2 TIMES DAILY PRN
Qty: 14 CAPSULE | Refills: 0 | Status: SHIPPED | OUTPATIENT
Start: 2025-01-17 | End: 2025-01-24

## 2025-01-17 RX ORDER — DOXYCYCLINE HYCLATE 100 MG
100 TABLET ORAL 2 TIMES DAILY
Qty: 14 TABLET | Refills: 0 | Status: SHIPPED | OUTPATIENT
Start: 2025-01-17 | End: 2025-01-24

## 2025-01-17 ASSESSMENT — ENCOUNTER SYMPTOMS
DIARRHEA: 0
EYE DISCHARGE: 0
NAUSEA: 0
RHINORRHEA: 0
COLOR CHANGE: 0
SHORTNESS OF BREATH: 1
SINUS PRESSURE: 1
WHEEZING: 0
VOICE CHANGE: 1
VOMITING: 0
CHEST TIGHTNESS: 1
TROUBLE SWALLOWING: 0
SORE THROAT: 1
CONSTIPATION: 0
ABDOMINAL PAIN: 0
COUGH: 1

## 2025-01-17 NOTE — PROGRESS NOTES
reviewed.   Constitutional:       General: She is awake. She is not in acute distress.     Appearance: She is not ill-appearing, toxic-appearing or diaphoretic.   HENT:      Head: Normocephalic.      Right Ear: External ear normal. A middle ear effusion is present.      Left Ear: External ear normal. A middle ear effusion is present.      Nose: Nose normal. No congestion or rhinorrhea.      Right Sinus: Right maxillary sinus tenderness: Mild tenderness.      Left Sinus: Left maxillary sinus tenderness: Mild tenderness.      Mouth/Throat:      Pharynx: Posterior oropharyngeal erythema present. No pharyngeal swelling, oropharyngeal exudate, uvula swelling or postnasal drip.   Cardiovascular:      Rate and Rhythm: Normal rate and regular rhythm.      Heart sounds: Normal heart sounds.   Pulmonary:      Effort: Pulmonary effort is normal. No respiratory distress.      Breath sounds: Normal breath sounds. No stridor. No wheezing, rhonchi or rales.      Comments: Harsh cough present during exam    Chest:      Chest wall: No tenderness.   Abdominal:      General: Bowel sounds are normal.   Skin:     General: Skin is warm and dry.   Neurological:      Mental Status: She is alert and oriented to person, place, and time.   Psychiatric:         Mood and Affect: Mood normal.         Behavior: Behavior normal. Behavior is cooperative.         Thought Content: Thought content normal.         Judgment: Judgment normal.       Vitals:    01/17/25 1018   BP: 132/74   Pulse: 100   Resp: 20   Temp: 98 °F (36.7 °C)   TempSrc: Tympanic   SpO2: 97%   Weight: 75.3 kg (166 lb)         Assessment:       Diagnosis Orders   1. Bacterial URI        2. Acute cough              Plan:   Assessment & Plan   Return if symptoms worsen or fail to improve.       Orders Placed This Encounter   Medications    doxycycline hyclate (VIBRA-TABS) 100 MG tablet     Sig: Take 1 tablet by mouth 2 times daily for 7 days     Dispense:  14 tablet     Refill:  0

## 2025-01-17 NOTE — PATIENT INSTRUCTIONS
Take medication as prescribed  Begin and complete full course of antibiotics  Increase fluids, can continue OTC medications such as Dayquil, Flonase, Mucinex  Rest, avoid activities that increase exertion  If symptoms worsen follow up with PCP or return to walk in clinic  Patient verbalized understanding and agrees with plan of care

## 2025-02-17 ENCOUNTER — OFFICE VISIT (OUTPATIENT)
Dept: PRIMARY CARE CLINIC | Age: 65
End: 2025-02-17
Payer: MEDICARE

## 2025-02-17 VITALS
DIASTOLIC BLOOD PRESSURE: 70 MMHG | SYSTOLIC BLOOD PRESSURE: 126 MMHG | RESPIRATION RATE: 20 BRPM | WEIGHT: 145 LBS | OXYGEN SATURATION: 96 % | HEART RATE: 111 BPM | BODY MASS INDEX: 28.32 KG/M2

## 2025-02-17 DIAGNOSIS — J04.0 LARYNGITIS: Primary | ICD-10-CM

## 2025-02-17 PROCEDURE — 99213 OFFICE O/P EST LOW 20 MIN: CPT | Performed by: NURSE PRACTITIONER

## 2025-02-17 PROCEDURE — 3074F SYST BP LT 130 MM HG: CPT | Performed by: NURSE PRACTITIONER

## 2025-02-17 PROCEDURE — 1124F ACP DISCUSS-NO DSCNMKR DOCD: CPT | Performed by: NURSE PRACTITIONER

## 2025-02-17 PROCEDURE — 1159F MED LIST DOCD IN RCRD: CPT | Performed by: NURSE PRACTITIONER

## 2025-02-17 PROCEDURE — 3078F DIAST BP <80 MM HG: CPT | Performed by: NURSE PRACTITIONER

## 2025-02-17 PROCEDURE — 1160F RVW MEDS BY RX/DR IN RCRD: CPT | Performed by: NURSE PRACTITIONER

## 2025-02-17 PROCEDURE — 99212 OFFICE O/P EST SF 10 MIN: CPT | Performed by: NURSE PRACTITIONER

## 2025-02-17 RX ORDER — DOXYCYCLINE HYCLATE 100 MG
100 TABLET ORAL 2 TIMES DAILY
Qty: 20 TABLET | Refills: 0 | Status: SHIPPED | OUTPATIENT
Start: 2025-02-17 | End: 2025-02-27

## 2025-02-17 ASSESSMENT — ENCOUNTER SYMPTOMS
VOICE CHANGE: 1
COUGH: 1

## 2025-02-17 NOTE — PROGRESS NOTES
Subjective:      Patient ID: Nelly Ramirez is a 64 y.o. female coming in for   Chief Complaint   Patient presents with    Cough     Pt is coughing up mucus, chest feels burning . Pt symptoms started this weekend.         History of Present Illness  The patient is a 64-year-old female who presents with congestion and cough.    She reports experiencing a persistent cough accompanied by mucus production. She describes an unusual sensation of burning, which she does not associate with her coughing episodes. She has not experienced any febrile episodes. She recalls a previous episode of bronchitis, during which she experienced a similar burning sensation, but notes that this current symptom appears spontaneously without any preceding cough. 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 The onset of her symptoms was the following day. She has a history of bronchitis.    She has been experiencing voice changes. She reports that her previous course of doxycycline 7 days twice a day was effective in alleviating her symptoms, and she is seeking a refill of this medication.    MEDICATIONS  Past: Doxycycline      Review of Systems   Constitutional:  Negative for fever.   HENT:  Positive for congestion and voice change.    Respiratory:  Positive for cough.         Objective:/70   Pulse (!) 111   Resp 20   Wt 65.8 kg (145 lb)   SpO2 96%   BMI 28.32 kg/m²      Physical Exam  Vitals and nursing note reviewed.   Constitutional:       General: She is not in acute distress.     Appearance: Normal appearance. She is not ill-appearing.   HENT:      Head: Normocephalic.      Right Ear: Tympanic membrane normal.      Left Ear: Tympanic membrane normal.      Nose: Congestion and rhinorrhea present.      Mouth/Throat:      Mouth: Mucous membranes are moist.      Pharynx: Oropharynx is clear. Postnasal drip present. No

## 2025-05-08 DIAGNOSIS — R10.13 EPIGASTRIC PAIN: ICD-10-CM

## 2025-05-08 DIAGNOSIS — K58.2 IRRITABLE BOWEL SYNDROME WITH BOTH CONSTIPATION AND DIARRHEA: ICD-10-CM

## 2025-05-08 RX ORDER — DICYCLOMINE HYDROCHLORIDE 10 MG/1
10 CAPSULE ORAL 4 TIMES DAILY PRN
Qty: 60 CAPSULE | Refills: 1 | Status: SHIPPED | OUTPATIENT
Start: 2025-05-08

## 2025-05-08 NOTE — TELEPHONE ENCOUNTER
Nelly called requesting a refill of the below medication which has been pended for you:     Requested Prescriptions     Pending Prescriptions Disp Refills    dicyclomine (BENTYL) 10 MG capsule 60 capsule 1     Sig: Take 1 capsule by mouth 4 times daily as needed (Cramps/Abdominal Pain)       Last Appointment Date: 2/17/2025  Next Appointment Date: Visit date not found    Allergies   Allergen Reactions    Latex Swelling     The powder in the gloves    Dilaudid [Hydromorphone Hcl] Nausea And Vomiting    Medrol [Methylprednisolone] Other (See Comments)     Back pain    Morphine Nausea And Vomiting    Prednisone Nausea And Vomiting

## 2025-06-24 ENCOUNTER — HOSPITAL ENCOUNTER (EMERGENCY)
Age: 65
Discharge: HOME | End: 2025-06-24
Attending: EMERGENCY MEDICINE
Payer: MEDICARE

## 2025-06-24 ENCOUNTER — APPOINTMENT (OUTPATIENT)
Dept: GENERAL RADIOLOGY | Age: 65
End: 2025-06-24
Payer: MEDICARE

## 2025-06-24 ENCOUNTER — OFFICE VISIT (OUTPATIENT)
Dept: PRIMARY CARE CLINIC | Age: 65
End: 2025-06-24

## 2025-06-24 VITALS
HEART RATE: 95 BPM | RESPIRATION RATE: 16 BRPM | SYSTOLIC BLOOD PRESSURE: 122 MMHG | HEIGHT: 60 IN | BODY MASS INDEX: 39.27 KG/M2 | OXYGEN SATURATION: 98 % | DIASTOLIC BLOOD PRESSURE: 61 MMHG | WEIGHT: 200 LBS | TEMPERATURE: 98 F

## 2025-06-24 DIAGNOSIS — K12.1 STOMATITIS: ICD-10-CM

## 2025-06-24 DIAGNOSIS — G89.29 CHRONIC LEFT SHOULDER PAIN: Primary | ICD-10-CM

## 2025-06-24 DIAGNOSIS — R11.0 NAUSEA: ICD-10-CM

## 2025-06-24 DIAGNOSIS — K13.79 MOUTH PAIN: Primary | ICD-10-CM

## 2025-06-24 DIAGNOSIS — M25.512 CHRONIC LEFT SHOULDER PAIN: Primary | ICD-10-CM

## 2025-06-24 DIAGNOSIS — E87.6 HYPOKALEMIA: ICD-10-CM

## 2025-06-24 LAB
ALBUMIN SERPL-MCNC: 4.2 G/DL (ref 3.5–5.2)
ALBUMIN/GLOB SERPL: 1.3 {RATIO} (ref 1–2.5)
ALP SERPL-CCNC: 83 U/L (ref 35–104)
ALT SERPL-CCNC: 10 U/L (ref 10–35)
ANION GAP SERPL CALCULATED.3IONS-SCNC: 13 MMOL/L (ref 9–16)
AST SERPL-CCNC: 18 U/L (ref 10–35)
BASOPHILS # BLD: <0.03 K/UL (ref 0–0.2)
BASOPHILS NFR BLD: 0 % (ref 0–2)
BILIRUB SERPL-MCNC: 0.2 MG/DL (ref 0–1.2)
BNP SERPL-MCNC: <36 PG/ML (ref 0–125)
BUN SERPL-MCNC: 9 MG/DL (ref 8–23)
BUN/CREAT SERPL: 15 (ref 9–20)
CALCIUM SERPL-MCNC: 9.2 MG/DL (ref 8.6–10.4)
CHLORIDE SERPL-SCNC: 105 MMOL/L (ref 98–107)
CK SERPL-CCNC: 169 U/L (ref 26–192)
CO2 SERPL-SCNC: 21 MMOL/L (ref 20–31)
CREAT SERPL-MCNC: 0.6 MG/DL (ref 0.6–0.9)
D DIMER PPP FEU-MCNC: 0.39 UG/ML FEU (ref 0–0.59)
EOSINOPHIL # BLD: 0.29 K/UL (ref 0–0.44)
EOSINOPHILS RELATIVE PERCENT: 3 % (ref 1–4)
ERYTHROCYTE [DISTWIDTH] IN BLOOD BY AUTOMATED COUNT: 13.6 % (ref 11.8–14.4)
ERYTHROCYTE [SEDIMENTATION RATE] IN BLOOD BY PHOTOMETRIC METHOD: 49 MM/HR (ref 0–30)
GFR, ESTIMATED: >90 ML/MIN/1.73M2
GLUCOSE SERPL-MCNC: 123 MG/DL (ref 74–99)
HCT VFR BLD AUTO: 37.1 % (ref 36.3–47.1)
HETEROPH AB BLD QL IA: NEGATIVE
HGB BLD-MCNC: 12.1 G/DL (ref 11.9–15.1)
IMM GRANULOCYTES # BLD AUTO: <0.03 K/UL (ref 0–0.3)
IMM GRANULOCYTES NFR BLD: 0 %
LIPASE SERPL-CCNC: 27 U/L (ref 13–60)
LYMPHOCYTES NFR BLD: 1.85 K/UL (ref 1.1–3.7)
LYMPHOCYTES RELATIVE PERCENT: 22 % (ref 24–43)
MCH RBC QN AUTO: 27.8 PG (ref 25.2–33.5)
MCHC RBC AUTO-ENTMCNC: 32.6 G/DL (ref 25.2–33.5)
MCV RBC AUTO: 85.3 FL (ref 82.6–102.9)
MONOCYTES NFR BLD: 0.73 K/UL (ref 0.1–1.2)
MONOCYTES NFR BLD: 9 % (ref 3–12)
NEUTROPHILS NFR BLD: 66 % (ref 36–65)
NEUTS SEG NFR BLD: 5.55 K/UL (ref 1.5–8.1)
NRBC BLD-RTO: 0 PER 100 WBC
PLATELET # BLD AUTO: 366 K/UL (ref 138–453)
PMV BLD AUTO: 9.7 FL (ref 8.1–13.5)
POTASSIUM SERPL-SCNC: 3.2 MMOL/L (ref 3.7–5.3)
PROT SERPL-MCNC: 7.5 G/DL (ref 6.6–8.7)
RBC # BLD AUTO: 4.35 M/UL (ref 3.95–5.11)
SODIUM SERPL-SCNC: 139 MMOL/L (ref 136–145)
T4 SERPL-MCNC: 6.9 UG/DL (ref 4.5–11.7)
TROPONIN I SERPL HS-MCNC: <6 NG/L (ref 0–14)
TROPONIN I SERPL HS-MCNC: <6 NG/L (ref 0–14)
TSH SERPL DL<=0.05 MIU/L-ACNC: 2.13 UIU/ML (ref 0.27–4.2)
WBC OTHER # BLD: 8.5 K/UL (ref 3.5–11.3)

## 2025-06-24 PROCEDURE — 84436 ASSAY OF TOTAL THYROXINE: CPT

## 2025-06-24 PROCEDURE — 83880 ASSAY OF NATRIURETIC PEPTIDE: CPT

## 2025-06-24 PROCEDURE — 36415 COLL VENOUS BLD VENIPUNCTURE: CPT

## 2025-06-24 PROCEDURE — 93005 ELECTROCARDIOGRAM TRACING: CPT | Performed by: EMERGENCY MEDICINE

## 2025-06-24 PROCEDURE — 85025 COMPLETE CBC W/AUTO DIFF WBC: CPT

## 2025-06-24 PROCEDURE — 84484 ASSAY OF TROPONIN QUANT: CPT

## 2025-06-24 PROCEDURE — 82550 ASSAY OF CK (CPK): CPT

## 2025-06-24 PROCEDURE — 86308 HETEROPHILE ANTIBODY SCREEN: CPT

## 2025-06-24 PROCEDURE — 99285 EMERGENCY DEPT VISIT HI MDM: CPT

## 2025-06-24 PROCEDURE — 71045 X-RAY EXAM CHEST 1 VIEW: CPT

## 2025-06-24 PROCEDURE — 83690 ASSAY OF LIPASE: CPT

## 2025-06-24 PROCEDURE — 85652 RBC SED RATE AUTOMATED: CPT

## 2025-06-24 PROCEDURE — 80053 COMPREHEN METABOLIC PANEL: CPT

## 2025-06-24 PROCEDURE — 84443 ASSAY THYROID STIM HORMONE: CPT

## 2025-06-24 PROCEDURE — 6370000000 HC RX 637 (ALT 250 FOR IP): Performed by: EMERGENCY MEDICINE

## 2025-06-24 PROCEDURE — 85379 FIBRIN DEGRADATION QUANT: CPT

## 2025-06-24 RX ORDER — DIPHENHYDRAMINE HCL 12.5 MG/5ML
12.5 SOLUTION ORAL ONCE
Status: COMPLETED | OUTPATIENT
Start: 2025-06-24 | End: 2025-06-24

## 2025-06-24 RX ORDER — HYDROCODONE BITARTRATE AND ACETAMINOPHEN 5; 325 MG/1; MG/1
1 TABLET ORAL ONCE
Status: COMPLETED | OUTPATIENT
Start: 2025-06-24 | End: 2025-06-24

## 2025-06-24 RX ORDER — METHOCARBAMOL 500 MG/1
500 TABLET, FILM COATED ORAL 3 TIMES DAILY PRN
Qty: 15 TABLET | Refills: 0 | Status: SHIPPED | OUTPATIENT
Start: 2025-06-24 | End: 2025-06-29

## 2025-06-24 RX ORDER — ONDANSETRON 4 MG/1
4 TABLET, ORALLY DISINTEGRATING ORAL EVERY 8 HOURS PRN
Qty: 10 TABLET | Refills: 0 | Status: SHIPPED | OUTPATIENT
Start: 2025-06-24

## 2025-06-24 RX ORDER — MAGNESIUM HYDROXIDE/ALUMINUM HYDROXICE/SIMETHICONE 120; 1200; 1200 MG/30ML; MG/30ML; MG/30ML
30 SUSPENSION ORAL ONCE
Status: COMPLETED | OUTPATIENT
Start: 2025-06-24 | End: 2025-06-24

## 2025-06-24 RX ORDER — LIDOCAINE HYDROCHLORIDE 40 MG/ML
4 SOLUTION TOPICAL ONCE
Status: DISCONTINUED | OUTPATIENT
Start: 2025-06-24 | End: 2025-06-24

## 2025-06-24 RX ORDER — NYSTATIN 100000 [USP'U]/ML
5 SUSPENSION ORAL ONCE
Status: COMPLETED | OUTPATIENT
Start: 2025-06-24 | End: 2025-06-24

## 2025-06-24 RX ORDER — TRAMADOL HYDROCHLORIDE 50 MG/1
50 TABLET ORAL EVERY 6 HOURS PRN
Qty: 12 TABLET | Refills: 0 | Status: SHIPPED | OUTPATIENT
Start: 2025-06-24 | End: 2025-06-27

## 2025-06-24 RX ORDER — LIDOCAINE HYDROCHLORIDE 20 MG/ML
15 SOLUTION OROPHARYNGEAL ONCE
Status: COMPLETED | OUTPATIENT
Start: 2025-06-24 | End: 2025-06-24

## 2025-06-24 RX ORDER — MORPHINE SULFATE 2 MG/ML
2 INJECTION, SOLUTION INTRAMUSCULAR; INTRAVENOUS ONCE
Refills: 0 | Status: DISCONTINUED | OUTPATIENT
Start: 2025-06-24 | End: 2025-06-24

## 2025-06-24 RX ADMIN — POTASSIUM BICARBONATE 40 MEQ: 782 TABLET, EFFERVESCENT ORAL at 17:27

## 2025-06-24 RX ADMIN — NYSTATIN 500000 UNITS: 100000 SUSPENSION ORAL at 17:53

## 2025-06-24 RX ADMIN — ALUMINUM HYDROXIDE, MAGNESIUM HYDROXIDE, AND SIMETHICONE 30 ML: 200; 200; 20 SUSPENSION ORAL at 17:53

## 2025-06-24 RX ADMIN — LIDOCAINE HYDROCHLORIDE 15 ML: 20 SOLUTION ORAL at 17:53

## 2025-06-24 RX ADMIN — HYDROCODONE BITARTRATE AND ACETAMINOPHEN 1 TABLET: 5; 325 TABLET ORAL at 17:27

## 2025-06-24 RX ADMIN — DIPHENHYDRAMINE HCL ORAL 12.5 MG: 12.5 SOLUTION ORAL at 17:53

## 2025-06-24 ASSESSMENT — ENCOUNTER SYMPTOMS
VOMITING: 0
BACK PAIN: 0
SORE THROAT: 1
SHORTNESS OF BREATH: 0
NAUSEA: 1

## 2025-06-24 ASSESSMENT — PAIN DESCRIPTION - LOCATION
LOCATION: SHOULDER

## 2025-06-24 ASSESSMENT — PAIN DESCRIPTION - ORIENTATION
ORIENTATION: LEFT

## 2025-06-24 ASSESSMENT — PAIN - FUNCTIONAL ASSESSMENT
PAIN_FUNCTIONAL_ASSESSMENT: 0-10

## 2025-06-24 ASSESSMENT — PAIN DESCRIPTION - DESCRIPTORS
DESCRIPTORS: DISCOMFORT

## 2025-06-24 ASSESSMENT — PAIN SCALES - GENERAL
PAINLEVEL_OUTOF10: 8
PAINLEVEL_OUTOF10: 6

## 2025-06-24 ASSESSMENT — PAIN DESCRIPTION - PAIN TYPE
TYPE: ACUTE PAIN;CHRONIC PAIN
TYPE: ACUTE PAIN;CHRONIC PAIN

## 2025-06-24 NOTE — ED PROVIDER NOTES
[facility] Emergency Department  [address] [facility] [address]  Phone: [phone]     Pt Name: Nelly Ramirez  MRN: [id number]  Birthdate 1960  Date of evaluation: 25  PCP:  Brian Carlson APRN - CNP    CHIEF COMPLAINT       Chief Complaint   Patient presents with    Chest Pain     Pt here via WC from urgent care c/o left shoulder pain since last Tuesday.        HISTORY OF PRESENT ILLNESS  (Location/Symptom, Timing/Onset, Context/Setting, Quality, Duration, Modifying Factors, Severity.)    Nelly Ramirez is a 64 y.o. female who presents with 1 week of left-sided shoulder pain that radiates down her left arm and into her left flank area.  She describes this as a toilet plunger sucking sensation.  She states that when she started not feeling well overall week ago she was having diffuse bodyaches, joint pain.  She has also been congested and has sore throat.  She has noticed ulcers in the underneath top lip as well as both of the sides of her mouth.  She does wear dentures which she took to out and cleaned generously and also has been swishing Listerine.  She is not having any shortness of breath or back pain.  Has been mildly nauseated but no vomiting.  She does have a history of lupus, is not on any immunosuppressive or chronic steroid therapy.  Denies any history of known CAD.  States that the shoulder pain is worse with trying to move/drive however denies any falls or blunt trauma.    PAST MEDICAL / SURGICAL / SOCIAL / FAMILY HISTORY    has a past medical history of Anxiety, Asthma, Cerebral artery occlusion with cerebral infarction (HCC), Cerebrovascular disease, Depression, Diverticulitis, Fibromyalgia, History of small bowel obstruction, Hypertension, Lupus, and Seizures (HCC).     has a past surgical history that includes Appendectomy;  section; Hysterectomy (); Dilation and curettage of uterus; Tubal ligation; Colonoscopy (); Colonoscopy (2015); Tooth Extraction

## 2025-06-24 NOTE — PROGRESS NOTES
Pt was taken over to the ED by MA via wheelchair. Pt was checked in with mouth pain but once in the room she stated her heart had been fluttering today. L shoulder pain,L side neck pain,L arm pain and chest pain on L side. Called ED registration and a ED nurse and gave report.

## 2025-06-25 LAB
EKG ATRIAL RATE: 106 BPM
EKG P AXIS: 48 DEGREES
EKG P-R INTERVAL: 146 MS
EKG Q-T INTERVAL: 300 MS
EKG QRS DURATION: 80 MS
EKG QTC CALCULATION (BAZETT): 398 MS
EKG R AXIS: 19 DEGREES
EKG T AXIS: 102 DEGREES
EKG VENTRICULAR RATE: 106 BPM

## 2025-08-06 DIAGNOSIS — M54.12 CERVICAL RADICULOPATHY: ICD-10-CM

## 2025-08-06 RX ORDER — METOPROLOL SUCCINATE 25 MG/1
25 TABLET, EXTENDED RELEASE ORAL DAILY
Qty: 30 TABLET | Refills: 0 | Status: SHIPPED | OUTPATIENT
Start: 2025-08-06

## 2025-08-06 RX ORDER — CYCLOBENZAPRINE HCL 10 MG
10 TABLET ORAL 3 TIMES DAILY PRN
Qty: 90 TABLET | Refills: 0 | Status: SHIPPED | OUTPATIENT
Start: 2025-08-06

## 2025-08-19 ENCOUNTER — HOSPITAL ENCOUNTER (OUTPATIENT)
Dept: LAB | Age: 65
Discharge: HOME | End: 2025-08-19
Payer: MEDICARE

## 2025-08-19 ENCOUNTER — OFFICE VISIT (OUTPATIENT)
Dept: FAMILY MEDICINE CLINIC | Age: 65
End: 2025-08-19
Payer: MEDICARE

## 2025-08-19 ENCOUNTER — TELEPHONE (OUTPATIENT)
Dept: PRIMARY CARE CLINIC | Age: 65
End: 2025-08-19

## 2025-08-19 VITALS
OXYGEN SATURATION: 97 % | DIASTOLIC BLOOD PRESSURE: 78 MMHG | HEART RATE: 81 BPM | SYSTOLIC BLOOD PRESSURE: 146 MMHG | RESPIRATION RATE: 18 BRPM | BODY MASS INDEX: 32.59 KG/M2 | HEIGHT: 60 IN | WEIGHT: 166 LBS

## 2025-08-19 DIAGNOSIS — J40 BRONCHITIS: ICD-10-CM

## 2025-08-19 DIAGNOSIS — Z78.0 POST-MENOPAUSAL: ICD-10-CM

## 2025-08-19 DIAGNOSIS — Z13.220 LIPID SCREENING: ICD-10-CM

## 2025-08-19 DIAGNOSIS — R73.9 ELEVATED RANDOM BLOOD GLUCOSE LEVEL: ICD-10-CM

## 2025-08-19 DIAGNOSIS — Z00.00 MEDICARE ANNUAL WELLNESS VISIT, SUBSEQUENT: Primary | ICD-10-CM

## 2025-08-19 DIAGNOSIS — M62.838 MUSCLE SPASM: ICD-10-CM

## 2025-08-19 DIAGNOSIS — I10 ESSENTIAL HYPERTENSION, BENIGN: ICD-10-CM

## 2025-08-19 DIAGNOSIS — E55.9 VITAMIN D DEFICIENCY: ICD-10-CM

## 2025-08-19 DIAGNOSIS — H65.23 BILATERAL CHRONIC SEROUS OTITIS MEDIA: ICD-10-CM

## 2025-08-19 LAB
ALBUMIN SERPL-MCNC: 4.1 G/DL (ref 3.5–5.2)
ALBUMIN/GLOB SERPL: 1.2 {RATIO} (ref 1–2.5)
ALP SERPL-CCNC: 76 U/L (ref 35–104)
ALT SERPL-CCNC: 9 U/L (ref 10–35)
ANION GAP SERPL CALCULATED.3IONS-SCNC: 12 MMOL/L (ref 9–16)
AST SERPL-CCNC: 18 U/L (ref 10–35)
BILIRUB SERPL-MCNC: 0.3 MG/DL (ref 0–1.2)
BUN SERPL-MCNC: 11 MG/DL (ref 8–23)
BUN/CREAT SERPL: 12 (ref 9–20)
CALCIUM SERPL-MCNC: 9.2 MG/DL (ref 8.6–10.4)
CHLORIDE SERPL-SCNC: 105 MMOL/L (ref 98–107)
CHOLEST SERPL-MCNC: 198 MG/DL (ref 0–199)
CHOLESTEROL/HDL RATIO: 6
CO2 SERPL-SCNC: 24 MMOL/L (ref 20–31)
CREAT SERPL-MCNC: 0.9 MG/DL (ref 0.6–0.9)
CREAT UR-MCNC: 324 MG/DL (ref 28–217)
EST. AVERAGE GLUCOSE BLD GHB EST-MCNC: 105 MG/DL
GFR, ESTIMATED: 70 ML/MIN/1.73M2
GLUCOSE SERPL-MCNC: 98 MG/DL (ref 74–99)
HBA1C MFR BLD: 5.3 % (ref 4–6)
HDLC SERPL-MCNC: 33 MG/DL
LDLC SERPL CALC-MCNC: 94 MG/DL (ref 0–100)
MAGNESIUM SERPL-MCNC: 2.1 MG/DL (ref 1.6–2.4)
MICROALBUMIN UR-MCNC: 56 MG/L (ref 0–20)
MICROALBUMIN/CREAT UR-RTO: 17 MCG/MG CREAT (ref 0–25)
POTASSIUM SERPL-SCNC: 3.6 MMOL/L (ref 3.7–5.3)
PROT SERPL-MCNC: 7.5 G/DL (ref 6.6–8.7)
SODIUM SERPL-SCNC: 141 MMOL/L (ref 136–145)
TRIGL SERPL-MCNC: 353 MG/DL
VLDLC SERPL CALC-MCNC: 71 MG/DL (ref 1–30)

## 2025-08-19 PROCEDURE — 36415 COLL VENOUS BLD VENIPUNCTURE: CPT

## 2025-08-19 PROCEDURE — G0439 PPPS, SUBSEQ VISIT: HCPCS | Performed by: NURSE PRACTITIONER

## 2025-08-19 PROCEDURE — 80053 COMPREHEN METABOLIC PANEL: CPT

## 2025-08-19 PROCEDURE — 80061 LIPID PANEL: CPT

## 2025-08-19 PROCEDURE — 1160F RVW MEDS BY RX/DR IN RCRD: CPT | Performed by: NURSE PRACTITIONER

## 2025-08-19 PROCEDURE — 99214 OFFICE O/P EST MOD 30 MIN: CPT | Performed by: NURSE PRACTITIONER

## 2025-08-19 PROCEDURE — 3078F DIAST BP <80 MM HG: CPT | Performed by: NURSE PRACTITIONER

## 2025-08-19 PROCEDURE — 3077F SYST BP >= 140 MM HG: CPT | Performed by: NURSE PRACTITIONER

## 2025-08-19 PROCEDURE — 1159F MED LIST DOCD IN RCRD: CPT | Performed by: NURSE PRACTITIONER

## 2025-08-19 PROCEDURE — 1124F ACP DISCUSS-NO DSCNMKR DOCD: CPT | Performed by: NURSE PRACTITIONER

## 2025-08-19 PROCEDURE — 83735 ASSAY OF MAGNESIUM: CPT

## 2025-08-19 PROCEDURE — 83036 HEMOGLOBIN GLYCOSYLATED A1C: CPT

## 2025-08-19 PROCEDURE — 82570 ASSAY OF URINE CREATININE: CPT

## 2025-08-19 PROCEDURE — 82043 UR ALBUMIN QUANTITATIVE: CPT

## 2025-08-19 RX ORDER — AMLODIPINE BESYLATE 10 MG/1
10 TABLET ORAL DAILY
Qty: 90 TABLET | Refills: 1 | Status: SHIPPED | OUTPATIENT
Start: 2025-08-19

## 2025-08-19 RX ORDER — METOPROLOL SUCCINATE 25 MG/1
25 TABLET, EXTENDED RELEASE ORAL DAILY
Qty: 90 TABLET | Refills: 1 | Status: SHIPPED | OUTPATIENT
Start: 2025-08-19

## 2025-08-19 RX ORDER — CODEINE PHOSPHATE AND GUAIFENESIN 10; 100 MG/5ML; MG/5ML
5 SOLUTION ORAL 4 TIMES DAILY PRN
Qty: 100 ML | Refills: 0 | Status: SHIPPED | OUTPATIENT
Start: 2025-08-19 | End: 2025-08-24

## 2025-08-19 RX ORDER — DOXYCYCLINE HYCLATE 100 MG
100 TABLET ORAL 2 TIMES DAILY
Qty: 20 TABLET | Refills: 0 | Status: SHIPPED | OUTPATIENT
Start: 2025-08-19 | End: 2025-08-29

## 2025-08-19 RX ORDER — ERGOCALCIFEROL 1.25 MG/1
50000 CAPSULE, LIQUID FILLED ORAL WEEKLY
Qty: 12 CAPSULE | Refills: 1 | Status: SHIPPED | OUTPATIENT
Start: 2025-08-19

## 2025-08-19 ASSESSMENT — PATIENT HEALTH QUESTIONNAIRE - PHQ9
6. FEELING BAD ABOUT YOURSELF - OR THAT YOU ARE A FAILURE OR HAVE LET YOURSELF OR YOUR FAMILY DOWN: NOT AT ALL
1. LITTLE INTEREST OR PLEASURE IN DOING THINGS: NOT AT ALL
9. THOUGHTS THAT YOU WOULD BE BETTER OFF DEAD, OR OF HURTING YOURSELF: NOT AT ALL
10. IF YOU CHECKED OFF ANY PROBLEMS, HOW DIFFICULT HAVE THESE PROBLEMS MADE IT FOR YOU TO DO YOUR WORK, TAKE CARE OF THINGS AT HOME, OR GET ALONG WITH OTHER PEOPLE: NOT DIFFICULT AT ALL
SUM OF ALL RESPONSES TO PHQ QUESTIONS 1-9: 0
4. FEELING TIRED OR HAVING LITTLE ENERGY: NOT AT ALL
SUM OF ALL RESPONSES TO PHQ QUESTIONS 1-9: 0
5. POOR APPETITE OR OVEREATING: NOT AT ALL
2. FEELING DOWN, DEPRESSED OR HOPELESS: NOT AT ALL
SUM OF ALL RESPONSES TO PHQ QUESTIONS 1-9: 0
8. MOVING OR SPEAKING SO SLOWLY THAT OTHER PEOPLE COULD HAVE NOTICED. OR THE OPPOSITE, BEING SO FIGETY OR RESTLESS THAT YOU HAVE BEEN MOVING AROUND A LOT MORE THAN USUAL: NOT AT ALL
SUM OF ALL RESPONSES TO PHQ QUESTIONS 1-9: 0
7. TROUBLE CONCENTRATING ON THINGS, SUCH AS READING THE NEWSPAPER OR WATCHING TELEVISION: NOT AT ALL
3. TROUBLE FALLING OR STAYING ASLEEP: NOT AT ALL

## 2025-08-19 ASSESSMENT — LIFESTYLE VARIABLES
HOW OFTEN DO YOU HAVE A DRINK CONTAINING ALCOHOL: NEVER
HOW MANY STANDARD DRINKS CONTAINING ALCOHOL DO YOU HAVE ON A TYPICAL DAY: PATIENT DOES NOT DRINK

## 2025-08-19 ASSESSMENT — ENCOUNTER SYMPTOMS
RHINORRHEA: 1
SINUS PRESSURE: 1
COUGH: 1

## 2025-08-20 ENCOUNTER — TELEPHONE (OUTPATIENT)
Dept: FAMILY MEDICINE CLINIC | Age: 65
End: 2025-08-20

## 2025-08-20 DIAGNOSIS — J40 BRONCHITIS: ICD-10-CM

## 2025-08-20 RX ORDER — ALBUTEROL SULFATE 90 UG/1
2 INHALANT RESPIRATORY (INHALATION) EVERY 4 HOURS PRN
Qty: 8.5 G | Refills: 2 | Status: SHIPPED | OUTPATIENT
Start: 2025-08-20

## 2025-08-28 ENCOUNTER — HOSPITAL ENCOUNTER (OUTPATIENT)
Dept: BONE DENSITY | Age: 65
Discharge: HOME | End: 2025-08-30
Payer: MEDICARE

## 2025-08-28 DIAGNOSIS — Z78.0 POST-MENOPAUSAL: ICD-10-CM

## 2025-08-28 PROCEDURE — 77080 DXA BONE DENSITY AXIAL: CPT

## (undated) DEVICE — LINE SAMP O2 6.5FT W/FEMALE CONN F/ADULT CAPNOLINE PLUS

## (undated) DEVICE — FORCEPS BX L240CM JAW DIA2.4MM ORNG L CAP W/ NDL DISP RAD

## (undated) DEVICE — CONNECTOR TBNG AUX H2O JET DISP FOR OLY 160/180 SER

## (undated) DEVICE — BITE BLOCK W/VELCRO STRAP

## (undated) DEVICE — MERCY DEFIANCE ENDO KIT: Brand: MEDLINE INDUSTRIES, INC.

## (undated) DEVICE — 1200CC GUARDIAN II: Brand: GUARDIAN

## (undated) DEVICE — 60 ML SYRINGE REGULAR TIP: Brand: MONOJECT

## (undated) DEVICE — COLONOSCOPE ENDOSCP ABSORBENT SM PEDIATRIC 104 MM VISION

## (undated) DEVICE — DISCONTINUED USE 419147 KIT ENDOSCOPY CUSTOM PACK